# Patient Record
Sex: FEMALE | Race: WHITE | NOT HISPANIC OR LATINO | Employment: OTHER | URBAN - METROPOLITAN AREA
[De-identification: names, ages, dates, MRNs, and addresses within clinical notes are randomized per-mention and may not be internally consistent; named-entity substitution may affect disease eponyms.]

---

## 2020-06-19 DIAGNOSIS — G93.3 POSTVIRAL FATIGUE SYNDROME: ICD-10-CM

## 2020-06-19 RX ORDER — PHENTERMINE HYDROCHLORIDE 37.5 MG/1
TABLET ORAL
Qty: 30 TABLET | Refills: 0 | Status: SHIPPED | OUTPATIENT
Start: 2020-06-19 | End: 2020-07-09 | Stop reason: SDUPTHER

## 2020-07-08 ENCOUNTER — TELEPHONE (OUTPATIENT)
Dept: FAMILY MEDICINE CLINIC | Facility: CLINIC | Age: 65
End: 2020-07-08

## 2020-07-08 DIAGNOSIS — G93.3 POSTVIRAL FATIGUE SYNDROME: ICD-10-CM

## 2020-07-08 DIAGNOSIS — E78.2 MIXED HYPERLIPIDEMIA: Primary | ICD-10-CM

## 2020-07-08 DIAGNOSIS — J30.1 ALLERGIC RHINITIS DUE TO POLLEN, UNSPECIFIED SEASONALITY: ICD-10-CM

## 2020-07-08 NOTE — TELEPHONE ENCOUNTER
patient called needs to change pharmacy  Please call in her scripts for   · Zetia 10 mg tablet   Take 1 tablet every day by oral route    · mometasone 50 mcg/actuation nasal spray   USE 1 SPRAY IN EACH NOSTRIL TWICE A DAY    · phentermine 37 5 mg tablet   half a day po      Please call into [facility]  Thank you [facility]

## 2020-07-09 RX ORDER — EZETIMIBE 10 MG/1
10 TABLET ORAL DAILY
COMMUNITY
Start: 2020-05-15 | End: 2020-07-09 | Stop reason: SDUPTHER

## 2020-07-09 RX ORDER — MOMETASONE FUROATE 50 UG/1
1 SPRAY, METERED NASAL 2 TIMES DAILY
COMMUNITY
End: 2020-07-09 | Stop reason: SDUPTHER

## 2020-07-09 RX ORDER — PHENTERMINE HYDROCHLORIDE 37.5 MG/1
18.75 TABLET ORAL DAILY
Qty: 45 TABLET | Refills: 0 | Status: SHIPPED | OUTPATIENT
Start: 2020-07-09 | End: 2020-09-18 | Stop reason: SDUPTHER

## 2020-07-09 RX ORDER — MOMETASONE FUROATE 50 UG/1
1 SPRAY, METERED NASAL 2 TIMES DAILY
Qty: 3 ACT | Refills: 1 | Status: SHIPPED | OUTPATIENT
Start: 2020-07-09 | End: 2021-12-08

## 2020-07-09 RX ORDER — EZETIMIBE 10 MG/1
10 TABLET ORAL DAILY
Qty: 90 TABLET | Refills: 1 | Status: SHIPPED | OUTPATIENT
Start: 2020-07-09 | End: 2020-09-18 | Stop reason: SDUPTHER

## 2020-09-18 ENCOUNTER — OFFICE VISIT (OUTPATIENT)
Dept: FAMILY MEDICINE CLINIC | Facility: CLINIC | Age: 65
End: 2020-09-18
Payer: MEDICARE

## 2020-09-18 VITALS
TEMPERATURE: 97.6 F | WEIGHT: 161 LBS | HEART RATE: 84 BPM | HEIGHT: 63 IN | OXYGEN SATURATION: 98 % | DIASTOLIC BLOOD PRESSURE: 76 MMHG | RESPIRATION RATE: 16 BRPM | SYSTOLIC BLOOD PRESSURE: 114 MMHG | BODY MASS INDEX: 28.53 KG/M2

## 2020-09-18 DIAGNOSIS — E78.2 MIXED HYPERLIPIDEMIA: ICD-10-CM

## 2020-09-18 DIAGNOSIS — J30.1 ALLERGIC RHINITIS DUE TO POLLEN, UNSPECIFIED SEASONALITY: ICD-10-CM

## 2020-09-18 DIAGNOSIS — R53.82 CHRONIC FATIGUE: Primary | ICD-10-CM

## 2020-09-18 DIAGNOSIS — G93.3 POSTVIRAL FATIGUE SYNDROME: ICD-10-CM

## 2020-09-18 DIAGNOSIS — M79.675 PAIN OF TOE OF LEFT FOOT: ICD-10-CM

## 2020-09-18 DIAGNOSIS — M25.572 ARTHRALGIA OF TOE OF LEFT FOOT: ICD-10-CM

## 2020-09-18 PROBLEM — G93.31 POSTVIRAL FATIGUE SYNDROME: Status: ACTIVE | Noted: 2020-09-18

## 2020-09-18 PROCEDURE — 3288F FALL RISK ASSESSMENT DOCD: CPT | Performed by: FAMILY MEDICINE

## 2020-09-18 PROCEDURE — 99214 OFFICE O/P EST MOD 30 MIN: CPT | Performed by: FAMILY MEDICINE

## 2020-09-18 PROCEDURE — 1036F TOBACCO NON-USER: CPT | Performed by: FAMILY MEDICINE

## 2020-09-18 PROCEDURE — 1101F PT FALLS ASSESS-DOCD LE1/YR: CPT | Performed by: FAMILY MEDICINE

## 2020-09-18 PROCEDURE — 3725F SCREEN DEPRESSION PERFORMED: CPT | Performed by: FAMILY MEDICINE

## 2020-09-18 RX ORDER — FUROSEMIDE 20 MG/1
1 TABLET ORAL 2 TIMES DAILY
COMMUNITY
Start: 2010-08-20 | End: 2021-12-08

## 2020-09-18 RX ORDER — EZETIMIBE 10 MG/1
10 TABLET ORAL DAILY
Qty: 90 TABLET | Refills: 1 | Status: SHIPPED | OUTPATIENT
Start: 2020-09-18 | End: 2020-10-19 | Stop reason: SDUPTHER

## 2020-09-18 RX ORDER — FEXOFENADINE HCL 180 MG/1
TABLET ORAL
COMMUNITY
Start: 2010-03-01

## 2020-09-18 RX ORDER — PHENTERMINE HYDROCHLORIDE 37.5 MG/1
18.75 TABLET ORAL DAILY
Qty: 45 TABLET | Refills: 0 | Status: SHIPPED | OUTPATIENT
Start: 2020-09-18 | End: 2020-10-19 | Stop reason: SDUPTHER

## 2020-09-18 RX ORDER — CICLESONIDE 50 UG/1
2 SPRAY NASAL DAILY
Qty: 12.5 G | Refills: 3 | Status: SHIPPED | OUTPATIENT
Start: 2020-09-18 | End: 2020-10-19 | Stop reason: SDUPTHER

## 2020-09-18 RX ORDER — CICLESONIDE 50 UG/1
SPRAY NASAL
COMMUNITY
Start: 2010-03-15 | End: 2020-09-18 | Stop reason: SDUPTHER

## 2020-09-18 NOTE — PROGRESS NOTES
BMI Counseling: Body mass index is 28 52 kg/m²  The BMI is above normal  Nutrition recommendations include decreasing portion sizes, encouraging healthy choices of fruits and vegetables, limiting drinks that contain sugar, moderation in carbohydrate intake, increasing intake of lean protein, reducing intake of saturated and trans fat and reducing intake of cholesterol  Exercise recommendations include exercising 3-5 times per week  No pharmacotherapy was ordered  Patient referred to PCP due to patient being overweight  Assessment/Plan:         Problem List Items Addressed This Visit        Respiratory    Allergic rhinitis due to pollen    Relevant Medications    ciclesonide (Omnaris) 50 MCG/ACT nasal spray       Other    Chronic fatigue - Primary    Mixed hyperlipidemia    Relevant Medications    ezetimibe (ZETIA) 10 mg tablet    Postviral fatigue syndrome    Relevant Medications    phentermine (ADIPEX-P) 37 5 MG tablet    Pain of toe of left foot      Other Visit Diagnoses     Arthralgia of toe of left foot        Relevant Orders    XR foot 3+ vw left            Subjective:      Patient ID: Jose F Hagen is a 72 y o  female  Pt here for  cehckup on  Chronic fatigue  , lipids   New problem of  3rd toe swollen and  Tender  The following portions of the patient's history were reviewed and updated as appropriate:   She has a past medical history of Hyperlipidemia and Obesity  ,  does not have any pertinent problems on file  ,   has a past surgical history that includes Knee surgery (Bilateral) and Tubal ligation  ,  family history includes Breast cancer in her maternal grandmother and paternal grandmother; Other in her mother  ,   reports that she quit smoking about 12 years ago  She smoked 1 00 pack per day  She has never used smokeless tobacco  She reports current alcohol use of about 2 0 standard drinks of alcohol per week   No history on file for drug ,  has No Known Allergies     Current Outpatient Medications   Medication Sig Dispense Refill    ezetimibe (ZETIA) 10 mg tablet Take 1 tablet (10 mg total) by mouth daily 90 tablet 1    mometasone (NASONEX) 50 mcg/act nasal spray 1 spray into each nostril 2 (two) times a day 3 Act 1    phentermine (ADIPEX-P) 37 5 MG tablet Take 0 5 tablets (18 75 mg total) by mouth daily 45 tablet 0    ciclesonide (Omnaris) 50 MCG/ACT nasal spray 2 sprays by Each Nare route daily 12 5 g 3    fexofenadine (ALLEGRA) 180 MG tablet Take by mouth      furosemide (Lasix) 20 mg tablet Take 1 tablet by mouth 2 (two) times a day       No current facility-administered medications for this visit  Review of Systems      Objective:  Vitals:    09/18/20 1229   BP: 114/76   BP Location: Left arm   Patient Position: Sitting   Cuff Size: Adult   Pulse: 84   Resp: 16   Temp: 97 6 °F (36 4 °C)   TempSrc: Temporal   SpO2: 98%   Weight: 73 kg (161 lb)   Height: 5' 3" (1 6 m)     Body mass index is 28 52 kg/m²       Physical Exam

## 2020-10-19 DIAGNOSIS — J30.1 ALLERGIC RHINITIS DUE TO POLLEN, UNSPECIFIED SEASONALITY: ICD-10-CM

## 2020-10-19 DIAGNOSIS — G93.3 POSTVIRAL FATIGUE SYNDROME: ICD-10-CM

## 2020-10-19 DIAGNOSIS — E78.2 MIXED HYPERLIPIDEMIA: ICD-10-CM

## 2020-10-20 RX ORDER — EZETIMIBE 10 MG/1
10 TABLET ORAL DAILY
Qty: 90 TABLET | Refills: 1 | Status: SHIPPED | OUTPATIENT
Start: 2020-10-20 | End: 2021-05-10

## 2020-10-20 RX ORDER — CICLESONIDE 50 UG/1
2 SPRAY NASAL DAILY
Qty: 12.5 G | Refills: 3 | Status: SHIPPED | OUTPATIENT
Start: 2020-10-20 | End: 2021-10-12

## 2020-10-20 RX ORDER — PHENTERMINE HYDROCHLORIDE 37.5 MG/1
18.75 TABLET ORAL DAILY
Qty: 45 TABLET | Refills: 0 | Status: SHIPPED | OUTPATIENT
Start: 2020-10-20 | End: 2021-01-05

## 2021-01-05 DIAGNOSIS — G93.3 POSTVIRAL FATIGUE SYNDROME: ICD-10-CM

## 2021-01-05 RX ORDER — PHENTERMINE HYDROCHLORIDE 37.5 MG/1
TABLET ORAL
Qty: 45 TABLET | Refills: 0 | Status: SHIPPED | OUTPATIENT
Start: 2021-01-05 | End: 2021-04-15

## 2021-04-15 DIAGNOSIS — G93.3 POSTVIRAL FATIGUE SYNDROME: ICD-10-CM

## 2021-04-15 RX ORDER — PHENTERMINE HYDROCHLORIDE 37.5 MG/1
TABLET ORAL
Qty: 45 TABLET | Refills: 0 | Status: SHIPPED | OUTPATIENT
Start: 2021-04-15 | End: 2021-07-12

## 2021-05-03 ENCOUNTER — TELEPHONE (OUTPATIENT)
Dept: FAMILY MEDICINE CLINIC | Facility: CLINIC | Age: 66
End: 2021-05-03

## 2021-05-04 DIAGNOSIS — Z11.59 ENCOUNTER FOR HEPATITIS C SCREENING TEST FOR LOW RISK PATIENT: Primary | ICD-10-CM

## 2021-05-04 DIAGNOSIS — E78.2 MIXED HYPERLIPIDEMIA: ICD-10-CM

## 2021-05-04 DIAGNOSIS — R53.82 CHRONIC FATIGUE: ICD-10-CM

## 2021-05-04 NOTE — TELEPHONE ENCOUNTER
Pt needs a bloodwork script to have done prior to her appointment on Monday  Pt will go to a [facility]'s facility - please call when ready

## 2021-05-06 ENCOUNTER — APPOINTMENT (OUTPATIENT)
Dept: LAB | Facility: MEDICAL CENTER | Age: 66
End: 2021-05-06
Payer: MEDICARE

## 2021-05-06 DIAGNOSIS — Z11.59 ENCOUNTER FOR HEPATITIS C SCREENING TEST FOR LOW RISK PATIENT: ICD-10-CM

## 2021-05-06 DIAGNOSIS — R53.82 CHRONIC FATIGUE: ICD-10-CM

## 2021-05-06 DIAGNOSIS — E78.2 MIXED HYPERLIPIDEMIA: ICD-10-CM

## 2021-05-06 LAB
ALBUMIN SERPL BCP-MCNC: 3.7 G/DL (ref 3.5–5)
ALP SERPL-CCNC: 124 U/L (ref 46–116)
ALT SERPL W P-5'-P-CCNC: 29 U/L (ref 12–78)
ANION GAP SERPL CALCULATED.3IONS-SCNC: 2 MMOL/L (ref 4–13)
AST SERPL W P-5'-P-CCNC: 14 U/L (ref 5–45)
BACTERIA UR QL AUTO: NORMAL /HPF
BASOPHILS # BLD AUTO: 0.04 THOUSANDS/ΜL (ref 0–0.1)
BASOPHILS NFR BLD AUTO: 1 % (ref 0–1)
BILIRUB SERPL-MCNC: 0.29 MG/DL (ref 0.2–1)
BILIRUB UR QL STRIP: NEGATIVE
BUN SERPL-MCNC: 22 MG/DL (ref 5–25)
CALCIUM SERPL-MCNC: 9.4 MG/DL (ref 8.3–10.1)
CHLORIDE SERPL-SCNC: 110 MMOL/L (ref 100–108)
CHOLEST SERPL-MCNC: 201 MG/DL (ref 50–200)
CLARITY UR: CLEAR
CO2 SERPL-SCNC: 28 MMOL/L (ref 21–32)
COLOR UR: YELLOW
CREAT SERPL-MCNC: 0.78 MG/DL (ref 0.6–1.3)
EOSINOPHIL # BLD AUTO: 0.34 THOUSAND/ΜL (ref 0–0.61)
EOSINOPHIL NFR BLD AUTO: 7 % (ref 0–6)
ERYTHROCYTE [DISTWIDTH] IN BLOOD BY AUTOMATED COUNT: 14.6 % (ref 11.6–15.1)
GFR SERPL CREATININE-BSD FRML MDRD: 79 ML/MIN/1.73SQ M
GLUCOSE P FAST SERPL-MCNC: 83 MG/DL (ref 65–99)
GLUCOSE UR STRIP-MCNC: NEGATIVE MG/DL
HCT VFR BLD AUTO: 44.6 % (ref 34.8–46.1)
HCV AB SER QL: NORMAL
HDLC SERPL-MCNC: 69 MG/DL
HGB BLD-MCNC: 14.3 G/DL (ref 11.5–15.4)
HGB UR QL STRIP.AUTO: NEGATIVE
IMM GRANULOCYTES # BLD AUTO: 0.01 THOUSAND/UL (ref 0–0.2)
IMM GRANULOCYTES NFR BLD AUTO: 0 % (ref 0–2)
KETONES UR STRIP-MCNC: NEGATIVE MG/DL
LDLC SERPL CALC-MCNC: 111 MG/DL (ref 0–100)
LEUKOCYTE ESTERASE UR QL STRIP: NEGATIVE
LYMPHOCYTES # BLD AUTO: 1.23 THOUSANDS/ΜL (ref 0.6–4.47)
LYMPHOCYTES NFR BLD AUTO: 25 % (ref 14–44)
MAGNESIUM SERPL-MCNC: 2.6 MG/DL (ref 1.6–2.6)
MCH RBC QN AUTO: 31.8 PG (ref 26.8–34.3)
MCHC RBC AUTO-ENTMCNC: 32.1 G/DL (ref 31.4–37.4)
MCV RBC AUTO: 99 FL (ref 82–98)
MONOCYTES # BLD AUTO: 0.5 THOUSAND/ΜL (ref 0.17–1.22)
MONOCYTES NFR BLD AUTO: 10 % (ref 4–12)
NEUTROPHILS # BLD AUTO: 2.9 THOUSANDS/ΜL (ref 1.85–7.62)
NEUTS SEG NFR BLD AUTO: 57 % (ref 43–75)
NITRITE UR QL STRIP: NEGATIVE
NON-SQ EPI CELLS URNS QL MICRO: NORMAL /HPF
NRBC BLD AUTO-RTO: 0 /100 WBCS
PH UR STRIP.AUTO: 6 [PH]
PLATELET # BLD AUTO: 266 THOUSANDS/UL (ref 149–390)
PMV BLD AUTO: 11.4 FL (ref 8.9–12.7)
POTASSIUM SERPL-SCNC: 4.9 MMOL/L (ref 3.5–5.3)
PROT SERPL-MCNC: 7.5 G/DL (ref 6.4–8.2)
PROT UR STRIP-MCNC: NEGATIVE MG/DL
RBC # BLD AUTO: 4.5 MILLION/UL (ref 3.81–5.12)
RBC #/AREA URNS AUTO: NORMAL /HPF
SODIUM SERPL-SCNC: 140 MMOL/L (ref 136–145)
SP GR UR STRIP.AUTO: 1.02 (ref 1–1.03)
T4 FREE SERPL-MCNC: 0.92 NG/DL (ref 0.76–1.46)
TRIGL SERPL-MCNC: 104 MG/DL
TSH SERPL DL<=0.05 MIU/L-ACNC: 1.99 UIU/ML (ref 0.36–3.74)
URATE SERPL-MCNC: 4.5 MG/DL (ref 2–6.8)
UROBILINOGEN UR QL STRIP.AUTO: 0.2 E.U./DL
WBC # BLD AUTO: 5.02 THOUSAND/UL (ref 4.31–10.16)
WBC #/AREA URNS AUTO: NORMAL /HPF

## 2021-05-06 PROCEDURE — 80061 LIPID PANEL: CPT

## 2021-05-06 PROCEDURE — 85025 COMPLETE CBC W/AUTO DIFF WBC: CPT

## 2021-05-06 PROCEDURE — 36415 COLL VENOUS BLD VENIPUNCTURE: CPT

## 2021-05-06 PROCEDURE — 84439 ASSAY OF FREE THYROXINE: CPT

## 2021-05-06 PROCEDURE — 86803 HEPATITIS C AB TEST: CPT

## 2021-05-06 PROCEDURE — 80053 COMPREHEN METABOLIC PANEL: CPT

## 2021-05-06 PROCEDURE — 83735 ASSAY OF MAGNESIUM: CPT

## 2021-05-06 PROCEDURE — 81001 URINALYSIS AUTO W/SCOPE: CPT | Performed by: FAMILY MEDICINE

## 2021-05-06 PROCEDURE — 84550 ASSAY OF BLOOD/URIC ACID: CPT

## 2021-05-06 PROCEDURE — 84443 ASSAY THYROID STIM HORMONE: CPT

## 2021-05-10 ENCOUNTER — OFFICE VISIT (OUTPATIENT)
Dept: FAMILY MEDICINE CLINIC | Facility: CLINIC | Age: 66
End: 2021-05-10
Payer: MEDICARE

## 2021-05-10 VITALS
BODY MASS INDEX: 28.7 KG/M2 | TEMPERATURE: 98.2 F | HEIGHT: 63 IN | DIASTOLIC BLOOD PRESSURE: 70 MMHG | RESPIRATION RATE: 16 BRPM | SYSTOLIC BLOOD PRESSURE: 136 MMHG | HEART RATE: 84 BPM | OXYGEN SATURATION: 97 % | WEIGHT: 162 LBS

## 2021-05-10 DIAGNOSIS — M80.08XA AGE-RELATED OSTEOPOROSIS WITH CURRENT PATHOLOGICAL FRACTURE, VERTEBRA(E), INITIAL ENCOUNTER FOR FRACTURE (HCC): ICD-10-CM

## 2021-05-10 DIAGNOSIS — Z23 ENCOUNTER FOR IMMUNIZATION: ICD-10-CM

## 2021-05-10 DIAGNOSIS — Z00.00 WELL ADULT EXAM: ICD-10-CM

## 2021-05-10 DIAGNOSIS — Z12.11 SCREENING FOR COLORECTAL CANCER: ICD-10-CM

## 2021-05-10 DIAGNOSIS — E78.2 MIXED HYPERLIPIDEMIA: ICD-10-CM

## 2021-05-10 DIAGNOSIS — Z12.31 SCREENING MAMMOGRAM FOR HIGH-RISK PATIENT: ICD-10-CM

## 2021-05-10 DIAGNOSIS — Z13.820 SCREENING FOR OSTEOPOROSIS: ICD-10-CM

## 2021-05-10 DIAGNOSIS — J30.1 ALLERGIC RHINITIS DUE TO POLLEN, UNSPECIFIED SEASONALITY: ICD-10-CM

## 2021-05-10 DIAGNOSIS — Z12.12 SCREENING FOR COLORECTAL CANCER: ICD-10-CM

## 2021-05-10 DIAGNOSIS — Z11.59 NEED FOR HEPATITIS C SCREENING TEST: ICD-10-CM

## 2021-05-10 DIAGNOSIS — E78.2 MIXED HYPERLIPIDEMIA: Primary | ICD-10-CM

## 2021-05-10 DIAGNOSIS — G47.34 IDIOPATHIC SLEEP RELATED NONOBSTRUCTIVE ALVEOLAR HYPOVENTILATION: ICD-10-CM

## 2021-05-10 DIAGNOSIS — R53.82 CHRONIC FATIGUE: ICD-10-CM

## 2021-05-10 PROCEDURE — 1123F ACP DISCUSS/DSCN MKR DOCD: CPT | Performed by: FAMILY MEDICINE

## 2021-05-10 PROCEDURE — G0438 PPPS, INITIAL VISIT: HCPCS | Performed by: FAMILY MEDICINE

## 2021-05-10 PROCEDURE — 99213 OFFICE O/P EST LOW 20 MIN: CPT | Performed by: FAMILY MEDICINE

## 2021-05-10 RX ORDER — EZETIMIBE 10 MG/1
TABLET ORAL
Qty: 90 TABLET | Refills: 1 | Status: SHIPPED | OUTPATIENT
Start: 2021-05-10 | End: 2021-11-08

## 2021-05-10 NOTE — PROGRESS NOTES
Assessment and Plan:     Problem List Items Addressed This Visit        Respiratory    Allergic rhinitis due to pollen     Patient is stable  and will continue present plan of care and reassess at next routine visit  All questions about this problem from patient were answered today  Other    Chronic fatigue     Patient is stable  and will continue present plan of care and reassess at next routine visit  All questions about this problem from patient were answered today  Mixed hyperlipidemia - Primary     Patient  is stable with current medication and we discussed a low fat low cholesterol diet  Weight loss also discussed for this will help lower cholesterol also  Recheck lipids in 6 months  Other Visit Diagnoses     Well adult exam        Need for hepatitis C screening test        Screening for colorectal cancer        Encounter for immunization               Preventive health issues were discussed with patient, and age appropriate screening tests were ordered as noted in patient's After Visit Summary  Personalized health advice and appropriate referrals for health education or preventive services given if needed, as noted in patient's After Visit Summary       History of Present Illness:     Patient presents for Medicare Annual Wellness visit    Patient Care Team:  Gwendolyn Lee MD as PCP - General (Family Medicine)     Problem List:     Patient Active Problem List   Diagnosis    Chronic fatigue    Mixed hyperlipidemia    Allergic rhinitis due to pollen    Postviral fatigue syndrome    Pain of toe of left foot      Past Medical and Surgical History:     Past Medical History:   Diagnosis Date    Asthma     Obesity      Past Surgical History:   Procedure Laterality Date    JOINT REPLACEMENT      KNEE SURGERY Bilateral     REPLACEMENT    TONSILLECTOMY      TUBAL LIGATION        Family History:     Family History   Problem Relation Age of Onset    Other Mother maintenance procedure for cardiac pacemaker system     Breast cancer Maternal Grandmother     Breast cancer Paternal Grandmother       Social History:     E-Cigarette/Vaping    E-Cigarette Use Never User      E-Cigarette/Vaping Substances    Nicotine No     THC No     CBD No     Flavoring No     Other No     Unknown No      Social History     Socioeconomic History    Marital status: /Civil Union     Spouse name: None    Number of children: None    Years of education: None    Highest education level: None   Occupational History    Occupation: RETIRED   Social Needs    Financial resource strain: None    Food insecurity     Worry: None     Inability: None    Transportation needs     Medical: None     Non-medical: None   Tobacco Use    Smoking status: Former Smoker     Packs/day: 1 00     Quit date: 2008     Years since quittin 6    Smokeless tobacco: Never Used   Substance and Sexual Activity    Alcohol use:  Yes     Alcohol/week: 2 0 standard drinks     Types: 1 Cans of beer, 1 Standard drinks or equivalent per week     Frequency: 2-3 times a week    Drug use: Never    Sexual activity: None   Lifestyle    Physical activity     Days per week: None     Minutes per session: None    Stress: None   Relationships    Social connections     Talks on phone: None     Gets together: None     Attends Protestant service: None     Active member of club or organization: None     Attends meetings of clubs or organizations: None     Relationship status: None    Intimate partner violence     Fear of current or ex partner: None     Emotionally abused: None     Physically abused: None     Forced sexual activity: None   Other Topics Concern    None   Social History Narrative    · Do you currently or have you served in the [employer]:   No      · Were you activated, into active duty, as a member of the [employer] or as a Reservist:   No      · Exercise level:   None      · Diet:   Regular · General stress level:   Low      · ·Caffeine intake: Moderate      · Chewing tobacco:   none      · Seat belts used routinely:   Yes      · Sunscreen used routinely:   No      · Smoke alarm in home: Yes      ·       Medications and Allergies:     Current Outpatient Medications   Medication Sig Dispense Refill    ciclesonide (Omnaris) 50 MCG/ACT nasal spray 2 sprays by Each Nare route daily 12 5 g 3    ezetimibe (ZETIA) 10 mg tablet TAKE 1 TABLET DAILY 90 tablet 1    mometasone (NASONEX) 50 mcg/act nasal spray 1 spray into each nostril 2 (two) times a day 3 Act 1    phentermine (ADIPEX-P) 37 5 MG tablet TAKE ONE-HALF (1/2) TABLET DAILY 45 tablet 0    fexofenadine (ALLEGRA) 180 MG tablet Take by mouth      furosemide (Lasix) 20 mg tablet Take 1 tablet by mouth 2 (two) times a day       No current facility-administered medications for this visit  No Known Allergies   Immunizations:     Immunization History   Administered Date(s) Administered    Tdap 07/24/2020      Health Maintenance:         Topic Date Due    MAMMOGRAM  Never done    Colorectal Cancer Screening  Never done    Hepatitis C Screening  Completed         Topic Date Due    COVID-19 Vaccine (1) Never done    Pneumococcal Vaccine: 65+ Years (2 of 2 - PPSV23) 03/15/2020      Medicare Health Risk Assessment:     /70 (BP Location: Left arm, Patient Position: Sitting, Cuff Size: Standard)   Pulse 84   Temp 98 2 °F (36 8 °C)   Resp 16   Ht 5' 3" (1 6 m)   Wt 73 5 kg (162 lb)   SpO2 97%   BMI 28 70 kg/m²      Magali is here for her Subsequent Wellness visit  Health Risk Assessment:   Patient rates overall health as good  Patient feels that their physical health rating is same  Patient is very satisfied with their life  Eyesight was rated as same  Hearing was rated as same  Patient feels that their emotional and mental health rating is same  Patients states they are never, rarely angry   Patient states they are never, rarely unusually tired/fatigued  Pain experienced in the last 7 days has been none  Patient states that she has experienced no weight loss or gain in last 6 months  Depression Screening:   PHQ-2 Score: 0      Fall Risk Screening: In the past year, patient has experienced: no history of falling in past year      Urinary Incontinence Screening:   Patient has not leaked urine accidently in the last six months  Home Safety:  Patient does not have trouble with stairs inside or outside of their home  Patient has no working smoke alarms and has no working carbon monoxide detector  Home safety hazards include: none  Nutrition:   Current diet is Low Saturated Fat and Low Cholesterol  Medications:   Patient is not currently taking any over-the-counter supplements  Patient is able to manage medications  Activities of Daily Living (ADLs)/Instrumental Activities of Daily Living (IADLs):   Walk and transfer into and out of bed and chair?: Yes  Dress and groom yourself?: Yes    Bathe or shower yourself?: Yes    Feed yourself?  Yes  Do your laundry/housekeeping?: Yes  Manage your money, pay your bills and track your expenses?: Yes  Make your own meals?: Yes    Do your own shopping?: Yes    Previous Hospitalizations:   Any hospitalizations or ED visits within the last 12 months?: No      Advance Care Planning:   Living will: No    Durable POA for healthcare: No    Advanced directive: No      PREVENTIVE SCREENINGS      Cardiovascular Screening:    General: Screening Not Indicated and History Lipid Disorder      Diabetes Screening:     General: Screening Current      Cervical Cancer Screening:    General: Screening Not Indicated      Lung Cancer Screening:     General: Screening Not Indicated      Hepatitis C Screening:    General: Screening Current    Screening, Brief Intervention, and Referral to Treatment (SBIRT)    Screening  Typical number of drinks in a day: 0  Typical number of drinks in a week: 6  Interpretation: Low risk drinking behavior      Single Item Drug Screening:  How often have you used an illegal drug (including marijuana) or a prescription medication for non-medical reasons in the past year? never    Single Item Drug Screen Score: 0  Interpretation: Negative screen for possible drug use disorder      Severa Bos, MD

## 2021-05-10 NOTE — PROGRESS NOTES
BMI Counseling: Body mass index is 28 7 kg/m²  The BMI is above normal  Nutrition recommendations include decreasing portion sizes, encouraging healthy choices of fruits and vegetables, decreasing fast food intake, consuming healthier snacks, limiting drinks that contain sugar, moderation in carbohydrate intake, increasing intake of lean protein, reducing intake of saturated and trans fat and reducing intake of cholesterol  Exercise recommendations include exercising 3-5 times per week  No pharmacotherapy was ordered  Patient referred to PCP due to patient being overweight  Falls Plan of Care: balance, strength, and gait training instructions were provided  Home safety education provided  Assessment/Plan:         Problem List Items Addressed This Visit        Respiratory    Allergic rhinitis due to pollen     Patient is stable  and will continue present plan of care and reassess at next routine visit  All questions about this problem from patient were answered today  Other    Chronic fatigue     Patient is stable  and will continue present plan of care and reassess at next routine visit  All questions about this problem from patient were answered today  Mixed hyperlipidemia - Primary     Patient  is stable with current medication and we discussed a low fat low cholesterol diet  Weight loss also discussed for this will help lower cholesterol also  Recheck lipids in 6 months  Other Visit Diagnoses     Well adult exam        Need for hepatitis C screening test        Screening for colorectal cancer        Encounter for immunization                Subjective:      Patient ID: Ashley Mccall is a 77 y o  female  51-year-old white female here today for checkup for hyperlipidemia chronic fatigue syndrome and Medicare Wellness  Patient is doing well with her medications she will call for refills if she needs them    Her Medicare wellness was completed as well as we reviewed her medications and as well as her lab results  Patient is doing well with her medicines  Patient is considering the COVID-19 vaccine  The following portions of the patient's history were reviewed and updated as appropriate:   Past Medical History:  She has a past medical history of Asthma and Obesity  ,  _______________________________________________________________________  Medical Problems:  does not have any pertinent problems on file ,  _______________________________________________________________________  Past Surgical History:   has a past surgical history that includes Knee surgery (Bilateral); Tubal ligation; Joint replacement; and Tonsillectomy  ,  _______________________________________________________________________  Family History:  family history includes Breast cancer in her maternal grandmother and paternal grandmother; Other in her mother ,  _______________________________________________________________________  Social History:   reports that she quit smoking about 12 years ago  She smoked 1 00 pack per day  She has never used smokeless tobacco  She reports current alcohol use of about 2 0 standard drinks of alcohol per week  She reports that she does not use drugs  ,  _______________________________________________________________________  Allergies:  has No Known Allergies     _______________________________________________________________________  Current Outpatient Medications   Medication Sig Dispense Refill    ciclesonide (Omnaris) 50 MCG/ACT nasal spray 2 sprays by Each Nare route daily 12 5 g 3    ezetimibe (ZETIA) 10 mg tablet TAKE 1 TABLET DAILY 90 tablet 1    mometasone (NASONEX) 50 mcg/act nasal spray 1 spray into each nostril 2 (two) times a day 3 Act 1    phentermine (ADIPEX-P) 37 5 MG tablet TAKE ONE-HALF (1/2) TABLET DAILY 45 tablet 0    fexofenadine (ALLEGRA) 180 MG tablet Take by mouth      furosemide (Lasix) 20 mg tablet Take 1 tablet by mouth 2 (two) times a day No current facility-administered medications for this visit       _______________________________________________________________________  Review of Systems      Objective:  Vitals:    05/10/21 1338   BP: 136/70   BP Location: Left arm   Patient Position: Sitting   Cuff Size: Standard   Pulse: 84   Resp: 16   Temp: 98 2 °F (36 8 °C)   SpO2: 97%   Weight: 73 5 kg (162 lb)   Height: 5' 3" (1 6 m)     Body mass index is 28 7 kg/m²       Physical Exam

## 2021-05-10 NOTE — PATIENT INSTRUCTIONS
Medicare Preventive Visit Patient Instructions  Thank you for completing your Welcome to Medicare Visit or Medicare Annual Wellness Visit today  Your next wellness visit will be due in one year (5/11/2022)  The screening/preventive services that you may require over the next 5-10 years are detailed below  Some tests may not apply to you based off risk factors and/or age  Screening tests ordered at today's visit but not completed yet may show as past due  Also, please note that scanned in results may not display below  Preventive Screenings:  Service Recommendations Previous Testing/Comments   Colorectal Cancer Screening  * Colonoscopy    * Fecal Occult Blood Test (FOBT)/Fecal Immunochemical Test (FIT)  * Fecal DNA/Cologuard Test  * Flexible Sigmoidoscopy Age: 54-65 years old   Colonoscopy: every 10 years (may be performed more frequently if at higher risk)  OR  FOBT/FIT: every 1 year  OR  Cologuard: every 3 years  OR  Sigmoidoscopy: every 5 years  Screening may be recommended earlier than age 48 if at higher risk for colorectal cancer  Also, an individualized decision between you and your healthcare provider will decide whether screening between the ages of 74-80 would be appropriate  Colonoscopy: Not on file  FOBT/FIT: Not on file  Cologuard: Not on file  Sigmoidoscopy: Not on file          Breast Cancer Screening Age: 36 years old  Frequency: every 1-2 years  Not required if history of left and right mastectomy Mammogram: Not on file        Cervical Cancer Screening Between the ages of 21-29, pap smear recommended once every 3 years  Between the ages of 33-67, can perform pap smear with HPV co-testing every 5 years     Recommendations may differ for women with a history of total hysterectomy, cervical cancer, or abnormal pap smears in past  Pap Smear: Not on file    Screening Not Indicated   Hepatitis C Screening Once for adults born between [facility]  More frequently in patients at high risk for Hepatitis C Hep C Antibody: 05/06/2021    Screening Current   Diabetes Screening 1-2 times per year if you're at risk for diabetes or have pre-diabetes Fasting glucose: 83 mg/dL   A1C: No results in last 5 years    Screening Current   Cholesterol Screening Once every 5 years if you don't have a lipid disorder  May order more often based on risk factors  Lipid panel: 05/06/2021    Screening Not Indicated  History Lipid Disorder     Other Preventive Screenings Covered by Medicare:  1  Abdominal Aortic Aneurysm (AAA) Screening: covered once if your at risk  You're considered to be at risk if you have a family history of AAA  2  Lung Cancer Screening: covers low dose CT scan once per year if you meet all of the following conditions: (1) Age 50-69; (2) No signs or symptoms of lung cancer; (3) Current smoker or have quit smoking within the last 15 years; (4) You have a tobacco smoking history of at least 30 pack years (packs per day multiplied by number of years you smoked); (5) You get a written order from a healthcare provider  3  Glaucoma Screening: covered annually if you're considered high risk: (1) You have diabetes OR (2) Family history of glaucoma OR (3)  aged 48 and older OR (3)  American aged 72 and older  3  Osteoporosis Screening: covered every 2 years if you meet one of the following conditions: (1) You're estrogen deficient and at risk for osteoporosis based off medical history and other findings; (2) Have a vertebral abnormality; (3) On glucocorticoid therapy for more than 3 months; (4) Have primary hyperparathyroidism; (5) On osteoporosis medications and need to assess response to drug therapy  · Last bone density test (DXA Scan): Not on file  5  HIV Screening: covered annually if you're between the age of 12-76  Also covered annually if you are younger than 13 and older than 72 with risk factors for HIV infection   For pregnant patients, it is covered up to 3 times per pregnancy  Immunizations:  Immunization Recommendations   Influenza Vaccine Annual influenza vaccination during flu season is recommended for all persons aged >= 6 months who do not have contraindications   Pneumococcal Vaccine (Prevnar and Pneumovax)  * Prevnar = PCV13  * Pneumovax = PPSV23   Adults 25-60 years old: 1-3 doses may be recommended based on certain risk factors  Adults 72 years old: Prevnar (PCV13) vaccine recommended followed by Pneumovax (PPSV23) vaccine  If already received PPSV23 since turning 65, then PCV13 recommended at least one year after PPSV23 dose  Hepatitis B Vaccine 3 dose series if at intermediate or high risk (ex: diabetes, end stage renal disease, liver disease)   Tetanus (Td) Vaccine - COST NOT COVERED BY MEDICARE PART B Following completion of primary series, a booster dose should be given every 10 years to maintain immunity against tetanus  Td may also be given as tetanus wound prophylaxis  Tdap Vaccine - COST NOT COVERED BY MEDICARE PART B Recommended at least once for all adults  For pregnant patients, recommended with each pregnancy  Shingles Vaccine (Shingrix) - COST NOT COVERED BY MEDICARE PART B  2 shot series recommended in those aged 48 and above     Health Maintenance Due:      Topic Date Due    MAMMOGRAM  Never done    Colorectal Cancer Screening  Never done    Hepatitis C Screening  Completed     Immunizations Due:      Topic Date Due    COVID-19 Vaccine (1) Never done    Pneumococcal Vaccine: 65+ Years (2 of 2 - PPSV23) 03/15/2020     Advance Directives   What are advance directives? Advance directives are legal documents that state your wishes and plans for medical care  These plans are made ahead of time in case you lose your ability to make decisions for yourself  Advance directives can apply to any medical decision, such as the treatments you want, and if you want to donate organs  What are the types of advance directives?   There are many types of advance directives, and each state has rules about how to use them  You may choose a combination of any of the following:  · Living will: This is a written record of the treatment you want  You can also choose which treatments you do not want, which to limit, and which to stop at a certain time  This includes surgery, medicine, IV fluid, and tube feedings  · Durable power of  for healthcare [facility]): This is a written record that states who you want to make healthcare choices for you when you are unable to make them for yourself  This person, called a proxy, is usually a family member or a friend  You may choose more than 1 proxy  · Do not resuscitate (DNR) order:  A DNR order is used in case your heart stops beating or you stop breathing  It is a request not to have certain forms of treatment, such as CPR  A DNR order may be included in other types of advance directives  · Medical directive: This covers the care that you want if you are in a coma, near death, or unable to make decisions for yourself  You can list the treatments you want for each condition  Treatment may include pain medicine, surgery, blood transfusions, dialysis, IV or tube feedings, and a ventilator (breathing machine)  · Values history: This document has questions about your views, beliefs, and how you feel and think about life  This information can help others choose the care that you would choose  Why are advance directives important? An advance directive helps you control your care  Although spoken wishes may be used, it is better to have your wishes written down  Spoken wishes can be misunderstood, or not followed  Treatments may be given even if you do not want them  An advance directive may make it easier for your family to make difficult choices about your care     Weight Management   Why it is important to manage your weight:  Being overweight increases your risk of health conditions such as heart disease, high blood pressure, type 2 diabetes, and certain types of cancer  It can also increase your risk for osteoarthritis, sleep apnea, and other respiratory problems  Aim for a slow, steady weight loss  Even a small amount of weight loss can lower your risk of health problems  How to lose weight safely:  A safe and healthy way to lose weight is to eat fewer calories and get regular exercise  You can lose up about 1 pound a week by decreasing the number of calories you eat by 500 calories each day  Healthy meal plan for weight management:  A healthy meal plan includes a variety of foods, contains fewer calories, and helps you stay healthy  A healthy meal plan includes the following:  · Eat whole-grain foods more often  A healthy meal plan should contain fiber  Fiber is the part of grains, fruits, and vegetables that is not broken down by your body  Whole-grain foods are healthy and provide extra fiber in your diet  Some examples of whole-grain foods are whole-wheat breads and pastas, oatmeal, brown rice, and bulgur  · Eat a variety of vegetables every day  Include dark, leafy greens such as spinach, kale, rere greens, and mustard greens  Eat yellow and orange vegetables such as carrots, sweet potatoes, and winter squash  · Eat a variety of fruits every day  Choose fresh or canned fruit (canned in its own juice or light syrup) instead of juice  Fruit juice has very little or no fiber  · Eat low-fat dairy foods  Drink fat-free (skim) milk or 1% milk  Eat fat-free yogurt and low-fat cottage cheese  Try low-fat cheeses such as mozzarella and other reduced-fat cheeses  · Choose meat and other protein foods that are low in fat  Choose beans or other legumes such as split peas or lentils  Choose fish, skinless poultry (chicken or turkey), or lean cuts of red meat (beef or pork)  Before you cook meat or poultry, cut off any visible fat  · Use less fat and oil  Try baking foods instead of frying them   Add less fat, such as margarine, sour cream, regular salad dressing and mayonnaise to foods  Eat fewer high-fat foods  Some examples of high-fat foods include french fries, doughnuts, ice cream, and cakes  · Eat fewer sweets  Limit foods and drinks that are high in sugar  This includes candy, cookies, regular soda, and sweetened drinks  Exercise:  Exercise at least 30 minutes per day on most days of the week  Some examples of exercise include walking, biking, dancing, and swimming  You can also fit in more physical activity by taking the stairs instead of the elevator or parking farther away from stores  Ask your healthcare provider about the best exercise plan for you  © Copyright [employer] 2018 Information is for End User's use only and may not be sold, redistributed or otherwise used for commercial purposes   All illustrations and images included in CareNotes® are the copyrighted property of A D A M , Inc  or [address] [place] [address]

## 2021-07-11 DIAGNOSIS — G93.3 POSTVIRAL FATIGUE SYNDROME: ICD-10-CM

## 2021-07-12 RX ORDER — PHENTERMINE HYDROCHLORIDE 37.5 MG/1
TABLET ORAL
Qty: 45 TABLET | Refills: 0 | Status: SHIPPED | OUTPATIENT
Start: 2021-07-12 | End: 2021-10-12

## 2021-10-12 DIAGNOSIS — G93.3 POSTVIRAL FATIGUE SYNDROME: ICD-10-CM

## 2021-10-12 DIAGNOSIS — J30.1 ALLERGIC RHINITIS DUE TO POLLEN, UNSPECIFIED SEASONALITY: ICD-10-CM

## 2021-10-12 RX ORDER — CICLESONIDE 50 UG/1
SPRAY NASAL
Qty: 12.5 G | Refills: 11 | Status: SHIPPED | OUTPATIENT
Start: 2021-10-12

## 2021-10-12 RX ORDER — PHENTERMINE HYDROCHLORIDE 37.5 MG/1
TABLET ORAL
Qty: 45 TABLET | Refills: 0 | Status: SHIPPED | OUTPATIENT
Start: 2021-10-12 | End: 2021-12-18

## 2021-10-18 ENCOUNTER — TELEPHONE (OUTPATIENT)
Dept: FAMILY MEDICINE CLINIC | Facility: CLINIC | Age: 66
End: 2021-10-18

## 2021-11-08 DIAGNOSIS — E78.2 MIXED HYPERLIPIDEMIA: ICD-10-CM

## 2021-11-08 RX ORDER — EZETIMIBE 10 MG/1
TABLET ORAL
Qty: 90 TABLET | Refills: 3 | Status: SHIPPED | OUTPATIENT
Start: 2021-11-08

## 2021-12-08 ENCOUNTER — OFFICE VISIT (OUTPATIENT)
Dept: FAMILY MEDICINE CLINIC | Facility: CLINIC | Age: 66
End: 2021-12-08
Payer: MEDICARE

## 2021-12-08 VITALS
HEIGHT: 63 IN | HEART RATE: 81 BPM | TEMPERATURE: 98 F | DIASTOLIC BLOOD PRESSURE: 68 MMHG | OXYGEN SATURATION: 96 % | BODY MASS INDEX: 28.7 KG/M2 | RESPIRATION RATE: 16 BRPM | SYSTOLIC BLOOD PRESSURE: 138 MMHG | WEIGHT: 162 LBS

## 2021-12-08 DIAGNOSIS — E78.2 MIXED HYPERLIPIDEMIA: ICD-10-CM

## 2021-12-08 DIAGNOSIS — Z12.12 SCREENING FOR COLORECTAL CANCER: ICD-10-CM

## 2021-12-08 DIAGNOSIS — M65.9 TENOSYNOVITIS OF THUMB: ICD-10-CM

## 2021-12-08 DIAGNOSIS — Z12.11 SCREENING FOR COLORECTAL CANCER: ICD-10-CM

## 2021-12-08 DIAGNOSIS — Z12.31 ENCOUNTER FOR SCREENING MAMMOGRAM FOR BREAST CANCER: ICD-10-CM

## 2021-12-08 DIAGNOSIS — R53.82 CHRONIC FATIGUE: ICD-10-CM

## 2021-12-08 DIAGNOSIS — J30.1 ALLERGIC RHINITIS DUE TO POLLEN, UNSPECIFIED SEASONALITY: Primary | ICD-10-CM

## 2021-12-08 PROBLEM — M65.949 TENOSYNOVITIS OF THUMB: Status: ACTIVE | Noted: 2021-12-08

## 2021-12-08 PROCEDURE — 99214 OFFICE O/P EST MOD 30 MIN: CPT | Performed by: FAMILY MEDICINE

## 2021-12-08 RX ORDER — NAPROXEN 500 MG/1
500 TABLET ORAL 2 TIMES DAILY WITH MEALS
Qty: 180 TABLET | Refills: 1 | Status: SHIPPED | OUTPATIENT
Start: 2021-12-08

## 2021-12-17 DIAGNOSIS — G93.3 POSTVIRAL FATIGUE SYNDROME: ICD-10-CM

## 2021-12-18 RX ORDER — PHENTERMINE HYDROCHLORIDE 37.5 MG/1
TABLET ORAL
Qty: 45 TABLET | Refills: 0 | Status: SHIPPED | OUTPATIENT
Start: 2021-12-18 | End: 2022-03-22

## 2022-01-02 ENCOUNTER — OFFICE VISIT (OUTPATIENT)
Dept: URGENT CARE | Facility: MEDICAL CENTER | Age: 67
End: 2022-01-02
Payer: MEDICARE

## 2022-01-02 VITALS
TEMPERATURE: 97.6 F | RESPIRATION RATE: 18 BRPM | HEART RATE: 90 BPM | WEIGHT: 159 LBS | OXYGEN SATURATION: 99 % | BODY MASS INDEX: 28.17 KG/M2 | DIASTOLIC BLOOD PRESSURE: 82 MMHG | HEIGHT: 63 IN | SYSTOLIC BLOOD PRESSURE: 168 MMHG

## 2022-01-02 DIAGNOSIS — S01.81XA LACERATION OF FOREHEAD, INITIAL ENCOUNTER: Primary | ICD-10-CM

## 2022-01-02 PROCEDURE — 12013 RPR F/E/E/N/L/M 2.6-5.0 CM: CPT | Performed by: PHYSICIAN ASSISTANT

## 2022-01-02 PROCEDURE — G0463 HOSPITAL OUTPT CLINIC VISIT: HCPCS | Performed by: PHYSICIAN ASSISTANT

## 2022-01-02 PROCEDURE — 99213 OFFICE O/P EST LOW 20 MIN: CPT | Performed by: PHYSICIAN ASSISTANT

## 2022-01-02 RX ORDER — CEPHALEXIN 500 MG/1
500 CAPSULE ORAL EVERY 6 HOURS SCHEDULED
Qty: 20 CAPSULE | Refills: 0 | Status: SHIPPED | OUTPATIENT
Start: 2022-01-02 | End: 2022-01-07

## 2022-01-02 NOTE — PROGRESS NOTES
[facility] [employer] [facility]        NAME: Sonja Pham is a 77 y o  female  : 1955    MRN: [id number]  DATE: 2022  TIME: 5:57 PM    Assessment and Plan   Laceration of forehead, initial encounter [S01 [id number]]  1  Laceration of forehead, initial encounter           Patient Instructions     Laceration forehead  dermabond applied  Follow up with PCP in 3-5 days  Proceed to  ER if symptoms worsen  Chief Complaint     Chief Complaint   Patient presents with    Laceration     Pt  tripped and fell hitting her forehead on the floor  She has a large laceration to her right forehead         History of Present Illness       76 y/o female presents c/o having tripped and fallen  States she thinks the glasses cut her forehead  Denies LOC, nausea, vomiting, unsteady gait, blood thinners  Tetanus injection in       Review of Systems   Review of Systems   Constitutional: Negative  HENT: Negative  Eyes: Negative  Respiratory: Negative  Negative for cough, chest tightness, shortness of breath, wheezing and stridor  Cardiovascular: Negative  Negative for chest pain, palpitations and leg swelling  Skin: Positive for wound           Current Medications       Current Outpatient Medications:     ezetimibe (ZETIA) 10 mg tablet, TAKE 1 TABLET DAILY, Disp: 90 tablet, Rfl: 3    naproxen (Naprosyn) 500 mg tablet, Take 1 tablet (500 mg total) by mouth 2 (two) times a day with meals, Disp: 180 tablet, Rfl: 1    Omnaris 50 MCG/ACT nasal spray, USE 2 SPRAYS IN EACH NOSTRIL DAILY, Disp: 12 5 g, Rfl: 11    phentermine (ADIPEX-P) 37 5 MG tablet, TAKE ONE-HALF (1/2) TABLET DAILY, Disp: 45 tablet, Rfl: 0    Elastic Bandages & Supports (Splint Wrist Brace/Left-Right) MISC, Use daily at bedtime, Disp: 1 each, Rfl: 0    fexofenadine (ALLEGRA) 180 MG tablet, Take by mouth (Patient not taking: Reported on 2022 ), Disp: , Rfl:     mometasone (NASONEX) 50 mcg/act nasal spray, 1 spray into each nostril 2 (two) times a day, Disp: 3 Act, Rfl: 1    Current Allergies     Allergies as of 01/02/2022    (No Known Allergies)            The following portions of the patient's history were reviewed and updated as appropriate: allergies, current medications, past family history, past medical history, past social history, past surgical history and problem list      Past Medical History:   Diagnosis Date    Arthritis     hands    Asthma     Obesity        Past Surgical History:   Procedure Laterality Date    JOINT REPLACEMENT      KNEE SURGERY Bilateral     REPLACEMENT    TONSILLECTOMY      TUBAL LIGATION         Family History   Problem Relation Age of Onset    Other Mother         maintenance procedure for cardiac pacemaker system     Breast cancer Maternal Grandmother     Breast cancer Paternal Grandmother          Medications have been verified  Objective   /82   Pulse 90   Temp 97 6 °F (36 4 °C)   Resp 18   Ht 5' 3" (1 6 m)   Wt 72 1 kg (159 lb)   SpO2 99%   BMI 28 17 kg/m²        Physical Exam     Physical Exam  Constitutional:       Appearance: Normal appearance  She is well-developed  HENT:      Head: Normocephalic and atraumatic  Right Ear: External ear normal       Left Ear: External ear normal       Nose: Nose normal       Mouth/Throat:      Pharynx: No oropharyngeal exudate  Cardiovascular:      Rate and Rhythm: Normal rate and regular rhythm  Heart sounds: Normal heart sounds  Pulmonary:      Effort: Pulmonary effort is normal  No respiratory distress  Breath sounds: Normal breath sounds  No wheezing or rales  Chest:      Chest wall: No tenderness  Musculoskeletal:      Cervical back: Normal range of motion and neck supple  Lymphadenopathy:      Cervical: No cervical adenopathy  Skin:         Neurological:      Mental Status: She is alert             Laceration repair    Date/Time: 1/2/2022 6:00 PM  Performed by: Henrique Goodman PA-C  Authorized by: Sydni Dhaliwal [address] Naomi Patel PA-C   Consent: Verbal consent obtained  Risks and benefits: risks, benefits and alternatives were discussed  Consent given by: patient  Patient understanding: patient states understanding of the procedure being performed  Patient identity confirmed: verbally with patient  Body area: head/neck  Location details: forehead  Laceration length: 4 cm  Foreign bodies: no foreign bodies  Tendon involvement: none  Nerve involvement: none  Vascular damage: no    Sedation:  Patient sedated: no      Wound Dehiscence:  Superficial Wound Dehiscence: simple closure      Procedure Details:  Preparation: Patient was prepped and draped in the usual sterile fashion    Irrigation solution: saline  Irrigation method: jet lavage  Amount of cleaning: standard  Debridement: none  Degree of undermining: none  Skin closure: glue  Dressing: 4x4 sterile gauze and gauze roll  Patient tolerance: patient tolerated the procedure well with no immediate complications

## 2022-01-02 NOTE — PATIENT INSTRUCTIONS
Laceration forehead  dermabond applied  Keflex as directed  Follow up with PCP in 3-5 days  Proceed to  ER if symptoms worsen

## 2022-03-22 DIAGNOSIS — G93.3 POSTVIRAL FATIGUE SYNDROME: ICD-10-CM

## 2022-03-22 RX ORDER — PHENTERMINE HYDROCHLORIDE 37.5 MG/1
TABLET ORAL
Qty: 45 TABLET | Refills: 0 | Status: SHIPPED | OUTPATIENT
Start: 2022-03-22 | End: 2022-06-17

## 2022-04-06 ENCOUNTER — OFFICE VISIT (OUTPATIENT)
Dept: FAMILY MEDICINE CLINIC | Facility: CLINIC | Age: 67
End: 2022-04-06
Payer: MEDICARE

## 2022-04-06 VITALS
BODY MASS INDEX: 27.29 KG/M2 | WEIGHT: 154 LBS | SYSTOLIC BLOOD PRESSURE: 152 MMHG | TEMPERATURE: 98 F | OXYGEN SATURATION: 98 % | RESPIRATION RATE: 16 BRPM | DIASTOLIC BLOOD PRESSURE: 80 MMHG | HEART RATE: 102 BPM | HEIGHT: 63 IN

## 2022-04-06 DIAGNOSIS — N34.2 INFECTIVE URETHRITIS: ICD-10-CM

## 2022-04-06 DIAGNOSIS — R39.9 UTI SYMPTOMS: Primary | ICD-10-CM

## 2022-04-06 LAB
SL AMB  POCT GLUCOSE, UA: ABNORMAL
SL AMB LEUKOCYTE ESTERASE,UA: ABNORMAL
SL AMB POCT BILIRUBIN,UA: ABNORMAL
SL AMB POCT BLOOD,UA: ABNORMAL
SL AMB POCT CLARITY,UA: ABNORMAL
SL AMB POCT COLOR,UA: YELLOW
SL AMB POCT KETONES,UA: ABNORMAL
SL AMB POCT NITRITE,UA: ABNORMAL
SL AMB POCT PH,UA: 6
SL AMB POCT SPECIFIC GRAVITY,UA: 1.01
SL AMB POCT URINE PROTEIN: ABNORMAL
SL AMB POCT UROBILINOGEN: 0.2

## 2022-04-06 PROCEDURE — 99213 OFFICE O/P EST LOW 20 MIN: CPT | Performed by: NURSE PRACTITIONER

## 2022-04-06 PROCEDURE — 81002 URINALYSIS NONAUTO W/O SCOPE: CPT | Performed by: NURSE PRACTITIONER

## 2022-04-06 RX ORDER — CIPROFLOXACIN 500 MG/1
500 TABLET, FILM COATED ORAL EVERY 12 HOURS SCHEDULED
Qty: 10 TABLET | Refills: 0 | Status: SHIPPED | OUTPATIENT
Start: 2022-04-06 | End: 2022-04-11

## 2022-04-06 NOTE — PROGRESS NOTES
Assessment/Plan:  UTI  In office urine dip demonstrated leukocytes nitrites red blood cells white blood cells patient is symptomatic did advise most likely bladder infection Cipro 500 mg b i d  X5 days was sent to the pharmacy dosing all possible side effects were discussed patient is advised if she develops vaginal yeast she should contact me in the office I can send Diflucan for to the pharmacy for her all questions answered patient states she is very happy with the outcome today's visit  Dosing all possible side effects of the prescribed medications or medications that had been prescribed in the past were reviewed and all questions were answered  Patient verbalized agreement and understanding of the plan of care as outlined during the office visit today return to office as indicated or sooner if a problem arises  Problem List Items Addressed This Visit     None      Visit Diagnoses     UTI symptoms    -  Primary    Relevant Orders    POCT urine dip            Subjective:      Patient ID: Kike Gauthier is a 79 y o  female  Patient 7 urinary tract infection symptoms  Dysuria has been off and on for several days  Nothing she has tried at home has worked  She denies any nausea vomiting diarrhea chest pains or shortness of breath      The following portions of the patient's history were reviewed and updated as appropriate: allergies, current medications, past family history, past medical history, past social history, past surgical history and problem list     Review of Systems   Constitutional: Negative for fever  Respiratory: Negative for shortness of breath  Cardiovascular: Negative for chest pain  Genitourinary: Positive for dysuria, frequency and urgency  Objective:      Resp 16   Ht 5' 3" (1 6 m)   Wt 69 9 kg (154 lb)   BMI 27 28 kg/m²          Physical Exam  Vitals and nursing note reviewed  Constitutional:       General: She is not in acute distress       Appearance: Normal appearance  She is well-developed  She is not diaphoretic  Cardiovascular:      Rate and Rhythm: Normal rate and regular rhythm  Pulmonary:      Effort: Pulmonary effort is normal       Breath sounds: Normal breath sounds  Abdominal:      General: Bowel sounds are normal       Palpations: Abdomen is soft  Tenderness: There is abdominal tenderness  Neurological:      Mental Status: She is alert and oriented to person, place, and time  Psychiatric:         Behavior: Behavior normal          Thought Content:  Thought content normal

## 2022-06-17 DIAGNOSIS — G93.3 POSTVIRAL FATIGUE SYNDROME: ICD-10-CM

## 2022-06-17 RX ORDER — PHENTERMINE HYDROCHLORIDE 37.5 MG/1
TABLET ORAL
Qty: 45 TABLET | Refills: 0 | Status: SHIPPED | OUTPATIENT
Start: 2022-06-17

## 2022-08-22 ENCOUNTER — APPOINTMENT (OUTPATIENT)
Dept: LAB | Facility: MEDICAL CENTER | Age: 67
End: 2022-08-22
Payer: MEDICARE

## 2022-08-22 DIAGNOSIS — E78.2 MIXED HYPERLIPIDEMIA: ICD-10-CM

## 2022-08-22 DIAGNOSIS — R53.82 CHRONIC FATIGUE: ICD-10-CM

## 2022-08-22 LAB
ALBUMIN SERPL BCP-MCNC: 3.4 G/DL (ref 3.5–5)
ALP SERPL-CCNC: 109 U/L (ref 46–116)
ALT SERPL W P-5'-P-CCNC: 31 U/L (ref 12–78)
ANION GAP SERPL CALCULATED.3IONS-SCNC: 3 MMOL/L (ref 4–13)
AST SERPL W P-5'-P-CCNC: 24 U/L (ref 5–45)
BACTERIA UR QL AUTO: ABNORMAL /HPF
BASOPHILS # BLD AUTO: 0.06 THOUSANDS/ΜL (ref 0–0.1)
BASOPHILS NFR BLD AUTO: 1 % (ref 0–1)
BILIRUB SERPL-MCNC: 0.27 MG/DL (ref 0.2–1)
BILIRUB UR QL STRIP: NEGATIVE
BUN SERPL-MCNC: 14 MG/DL (ref 5–25)
CALCIUM ALBUM COR SERPL-MCNC: 9.9 MG/DL (ref 8.3–10.1)
CALCIUM SERPL-MCNC: 9.4 MG/DL (ref 8.3–10.1)
CHLORIDE SERPL-SCNC: 108 MMOL/L (ref 96–108)
CHOLEST SERPL-MCNC: 197 MG/DL
CLARITY UR: CLEAR
CO2 SERPL-SCNC: 29 MMOL/L (ref 21–32)
COLOR UR: ABNORMAL
CREAT SERPL-MCNC: 0.71 MG/DL (ref 0.6–1.3)
EOSINOPHIL # BLD AUTO: 0.31 THOUSAND/ΜL (ref 0–0.61)
EOSINOPHIL NFR BLD AUTO: 7 % (ref 0–6)
ERYTHROCYTE [DISTWIDTH] IN BLOOD BY AUTOMATED COUNT: 14.6 % (ref 11.6–15.1)
GFR SERPL CREATININE-BSD FRML MDRD: 88 ML/MIN/1.73SQ M
GLUCOSE P FAST SERPL-MCNC: 86 MG/DL (ref 65–99)
GLUCOSE UR STRIP-MCNC: NEGATIVE MG/DL
HCT VFR BLD AUTO: 44.2 % (ref 34.8–46.1)
HDLC SERPL-MCNC: 60 MG/DL
HGB BLD-MCNC: 14 G/DL (ref 11.5–15.4)
HGB UR QL STRIP.AUTO: NEGATIVE
IMM GRANULOCYTES # BLD AUTO: 0.01 THOUSAND/UL (ref 0–0.2)
IMM GRANULOCYTES NFR BLD AUTO: 0 % (ref 0–2)
KETONES UR STRIP-MCNC: NEGATIVE MG/DL
LDLC SERPL CALC-MCNC: 116 MG/DL (ref 0–100)
LEUKOCYTE ESTERASE UR QL STRIP: ABNORMAL
LYMPHOCYTES # BLD AUTO: 1.42 THOUSANDS/ΜL (ref 0.6–4.47)
LYMPHOCYTES NFR BLD AUTO: 30 % (ref 14–44)
MAGNESIUM SERPL-MCNC: 2.4 MG/DL (ref 1.6–2.6)
MCH RBC QN AUTO: 30.9 PG (ref 26.8–34.3)
MCHC RBC AUTO-ENTMCNC: 31.7 G/DL (ref 31.4–37.4)
MCV RBC AUTO: 98 FL (ref 82–98)
MONOCYTES # BLD AUTO: 0.45 THOUSAND/ΜL (ref 0.17–1.22)
MONOCYTES NFR BLD AUTO: 10 % (ref 4–12)
MUCOUS THREADS UR QL AUTO: ABNORMAL
NEUTROPHILS # BLD AUTO: 2.43 THOUSANDS/ΜL (ref 1.85–7.62)
NEUTS SEG NFR BLD AUTO: 52 % (ref 43–75)
NITRITE UR QL STRIP: NEGATIVE
NON-SQ EPI CELLS URNS QL MICRO: ABNORMAL /HPF
NRBC BLD AUTO-RTO: 0 /100 WBCS
PH UR STRIP.AUTO: 6 [PH]
PLATELET # BLD AUTO: 321 THOUSANDS/UL (ref 149–390)
PMV BLD AUTO: 10.6 FL (ref 8.9–12.7)
POTASSIUM SERPL-SCNC: 4.9 MMOL/L (ref 3.5–5.3)
PROT SERPL-MCNC: 6.8 G/DL (ref 6.4–8.4)
PROT UR STRIP-MCNC: NEGATIVE MG/DL
RBC # BLD AUTO: 4.53 MILLION/UL (ref 3.81–5.12)
RBC #/AREA URNS AUTO: ABNORMAL /HPF
RENAL EPI CELLS #/AREA URNS HPF: PRESENT /[HPF]
SODIUM SERPL-SCNC: 140 MMOL/L (ref 135–147)
SP GR UR STRIP.AUTO: 1.02 (ref 1–1.03)
TRANS CELLS #/AREA URNS HPF: PRESENT /[HPF]
TRIGL SERPL-MCNC: 103 MG/DL
URATE SERPL-MCNC: 4.8 MG/DL (ref 2–7.5)
UROBILINOGEN UR STRIP-ACNC: <2 MG/DL
WBC # BLD AUTO: 4.68 THOUSAND/UL (ref 4.31–10.16)
WBC #/AREA URNS AUTO: ABNORMAL /HPF

## 2022-08-22 PROCEDURE — 80053 COMPREHEN METABOLIC PANEL: CPT

## 2022-08-22 PROCEDURE — 36415 COLL VENOUS BLD VENIPUNCTURE: CPT

## 2022-08-22 PROCEDURE — 85025 COMPLETE CBC W/AUTO DIFF WBC: CPT

## 2022-08-22 PROCEDURE — 80061 LIPID PANEL: CPT

## 2022-08-22 PROCEDURE — 84550 ASSAY OF BLOOD/URIC ACID: CPT

## 2022-08-22 PROCEDURE — 83735 ASSAY OF MAGNESIUM: CPT

## 2022-08-28 NOTE — ASSESSMENT & PLAN NOTE
Pt is to avoid those substances that precipitate allergic reaction and to use OY+TC antihistamines and/or nasal steroid spray prn

## 2022-08-29 ENCOUNTER — OFFICE VISIT (OUTPATIENT)
Dept: FAMILY MEDICINE CLINIC | Facility: CLINIC | Age: 67
End: 2022-08-29
Payer: MEDICARE

## 2022-08-29 VITALS
WEIGHT: 163 LBS | HEART RATE: 80 BPM | DIASTOLIC BLOOD PRESSURE: 78 MMHG | TEMPERATURE: 97.9 F | SYSTOLIC BLOOD PRESSURE: 160 MMHG | BODY MASS INDEX: 28.88 KG/M2 | OXYGEN SATURATION: 96 % | HEIGHT: 63 IN

## 2022-08-29 DIAGNOSIS — R53.82 CHRONIC FATIGUE: ICD-10-CM

## 2022-08-29 DIAGNOSIS — Z00.00 MEDICARE ANNUAL WELLNESS VISIT, SUBSEQUENT: ICD-10-CM

## 2022-08-29 DIAGNOSIS — Z98.890 HISTORY OF COLONOSCOPY: ICD-10-CM

## 2022-08-29 DIAGNOSIS — J30.1 ALLERGIC RHINITIS DUE TO POLLEN, UNSPECIFIED SEASONALITY: ICD-10-CM

## 2022-08-29 DIAGNOSIS — Z12.31 ENCOUNTER FOR SCREENING MAMMOGRAM FOR BREAST CANCER: Primary | ICD-10-CM

## 2022-08-29 DIAGNOSIS — E78.2 MIXED HYPERLIPIDEMIA: ICD-10-CM

## 2022-08-29 DIAGNOSIS — Z78.0 MENOPAUSE: ICD-10-CM

## 2022-08-29 PROCEDURE — 99214 OFFICE O/P EST MOD 30 MIN: CPT | Performed by: FAMILY MEDICINE

## 2022-08-29 PROCEDURE — G0439 PPPS, SUBSEQ VISIT: HCPCS | Performed by: FAMILY MEDICINE

## 2022-08-29 RX ORDER — MOMETASONE FUROATE 50 UG/1
1 SPRAY, METERED NASAL 2 TIMES DAILY
Qty: 3 ACT | Refills: 1 | Status: SHIPPED | OUTPATIENT
Start: 2022-08-29 | End: 2022-11-27

## 2022-08-29 NOTE — PATIENT INSTRUCTIONS
Medicare Preventive Visit Patient Instructions  Thank you for completing your Welcome to Medicare Visit or Medicare Annual Wellness Visit today  Your next wellness visit will be due in one year (8/30/2023)  The screening/preventive services that you may require over the next 5-10 years are detailed below  Some tests may not apply to you based off risk factors and/or age  Screening tests ordered at today's visit but not completed yet may show as past due  Also, please note that scanned in results may not display below  Preventive Screenings:  Service Recommendations Previous Testing/Comments   Colorectal Cancer Screening  * Colonoscopy    * Fecal Occult Blood Test (FOBT)/Fecal Immunochemical Test (FIT)  * Fecal DNA/Cologuard Test  * Flexible Sigmoidoscopy Age: 39-70 years old   Colonoscopy: every 10 years (may be performed more frequently if at higher risk)  OR  FOBT/FIT: every 1 year  OR  Cologuard: every 3 years  OR  Sigmoidoscopy: every 5 years  Screening may be recommended earlier than age 39 if at higher risk for colorectal cancer  Also, an individualized decision between you and your healthcare provider will decide whether screening between the ages of 74-80 would be appropriate  Colonoscopy: Not on file  FOBT/FIT: Not on file  Cologuard: Not on file  Sigmoidoscopy: Not on file          Breast Cancer Screening Age: 36 years old  Frequency: every 1-2 years  Not required if history of left and right mastectomy Mammogram: Not on file        Cervical Cancer Screening Between the ages of 21-29, pap smear recommended once every 3 years  Between the ages of 33-67, can perform pap smear with HPV co-testing every 5 years     Recommendations may differ for women with a history of total hysterectomy, cervical cancer, or abnormal pap smears in past  Pap Smear: Not on file    Screening Not Indicated   Hepatitis C Screening Once for adults born between [facility]  More frequently in patients at high risk for Hepatitis C Hep C Antibody: 05/06/2021    Screening Current   Diabetes Screening 1-2 times per year if you're at risk for diabetes or have pre-diabetes Fasting glucose: 86 mg/dL (8/22/2022)  A1C: No results in last 5 years (No results in last 5 years)  Screening Current   Cholesterol Screening Once every 5 years if you don't have a lipid disorder  May order more often based on risk factors  Lipid panel: 08/22/2022    Screening Not Indicated  History Lipid Disorder     Other Preventive Screenings Covered by Medicare:  1  Abdominal Aortic Aneurysm (AAA) Screening: covered once if your at risk  You're considered to be at risk if you have a family history of AAA  2  Lung Cancer Screening: covers low dose CT scan once per year if you meet all of the following conditions: (1) Age 50-69; (2) No signs or symptoms of lung cancer; (3) Current smoker or have quit smoking within the last 15 years; (4) You have a tobacco smoking history of at least 20 pack years (packs per day multiplied by number of years you smoked); (5) You get a written order from a healthcare provider  3  Glaucoma Screening: covered annually if you're considered high risk: (1) You have diabetes OR (2) Family history of glaucoma OR (3)  aged 48 and older OR (3)  American aged 72 and older  3  Osteoporosis Screening: covered every 2 years if you meet one of the following conditions: (1) You're estrogen deficient and at risk for osteoporosis based off medical history and other findings; (2) Have a vertebral abnormality; (3) On glucocorticoid therapy for more than 3 months; (4) Have primary hyperparathyroidism; (5) On osteoporosis medications and need to assess response to drug therapy  · Last bone density test (DXA Scan): Not on file  5  HIV Screening: covered annually if you're between the age of 12-76  Also covered annually if you are younger than 13 and older than 72 with risk factors for HIV infection   For pregnant patients, it is covered up to 3 times per pregnancy  Immunizations:  Immunization Recommendations   Influenza Vaccine Annual influenza vaccination during flu season is recommended for all persons aged >= 6 months who do not have contraindications   Pneumococcal Vaccine   * Pneumococcal conjugate vaccine = PCV13 (Prevnar 13), PCV15 (Vaxneuvance), PCV20 (Prevnar 20)  * Pneumococcal polysaccharide vaccine = PPSV23 (Pneumovax) Adults 25-60 years old: 1-3 doses may be recommended based on certain risk factors  Adults 72 years old: 1-2 doses may be recommended based off what pneumonia vaccine you previously received   Hepatitis B Vaccine 3 dose series if at intermediate or high risk (ex: diabetes, end stage renal disease, liver disease)   Tetanus (Td) Vaccine - COST NOT COVERED BY MEDICARE PART B Following completion of primary series, a booster dose should be given every 10 years to maintain immunity against tetanus  Td may also be given as tetanus wound prophylaxis  Tdap Vaccine - COST NOT COVERED BY MEDICARE PART B Recommended at least once for all adults  For pregnant patients, recommended with each pregnancy  Shingles Vaccine (Shingrix) - COST NOT COVERED BY MEDICARE PART B  2 shot series recommended in those aged 48 and above     Health Maintenance Due:      Topic Date Due    Breast Cancer Screening: Mammogram  Never done    Colorectal Cancer Screening  Never done    Hepatitis C Screening  Completed     Immunizations Due:      Topic Date Due    Pneumococcal Vaccine: 65+ Years (2 - PPSV23 or PCV20) 03/15/2020    COVID-19 Vaccine (3 - Booster for Pfizer series) 06/27/2022    Influenza Vaccine (1) 09/01/2022     Advance Directives   What are advance directives? Advance directives are legal documents that state your wishes and plans for medical care  These plans are made ahead of time in case you lose your ability to make decisions for yourself   Advance directives can apply to any medical decision, such as the treatments you want, and if you want to donate organs  What are the types of advance directives? There are many types of advance directives, and each state has rules about how to use them  You may choose a combination of any of the following:  · Living will: This is a written record of the treatment you want  You can also choose which treatments you do not want, which to limit, and which to stop at a certain time  This includes surgery, medicine, IV fluid, and tube feedings  · Durable power of  for healthcare [facility]): This is a written record that states who you want to make healthcare choices for you when you are unable to make them for yourself  This person, called a proxy, is usually a family member or a friend  You may choose more than 1 proxy  · Do not resuscitate (DNR) order:  A DNR order is used in case your heart stops beating or you stop breathing  It is a request not to have certain forms of treatment, such as CPR  A DNR order may be included in other types of advance directives  · Medical directive: This covers the care that you want if you are in a coma, near death, or unable to make decisions for yourself  You can list the treatments you want for each condition  Treatment may include pain medicine, surgery, blood transfusions, dialysis, IV or tube feedings, and a ventilator (breathing machine)  · Values history: This document has questions about your views, beliefs, and how you feel and think about life  This information can help others choose the care that you would choose  Why are advance directives important? An advance directive helps you control your care  Although spoken wishes may be used, it is better to have your wishes written down  Spoken wishes can be misunderstood, or not followed  Treatments may be given even if you do not want them  An advance directive may make it easier for your family to make difficult choices about your care     Fall Prevention    Fall prevention includes ways to make your home and other areas safer  It also includes ways you can move more carefully to prevent a fall  Health conditions that cause changes in your blood pressure, vision, or muscle strength and coordination may increase your risk for falls  Medicines may also increase your risk for falls if they make you dizzy, weak, or sleepy  Fall prevention tips:   · Stand or sit up slowly  · Use assistive devices as directed  · Wear shoes that fit well and have soles that   · Wear a personal alarm  · Stay active  · Manage your medical conditions  Home Safety Tips:  · Add items to prevent falls in the bathroom  · Keep paths clear  · Install bright lights in your home  · Keep items you use often on shelves within reach  · Paint or place reflective tape on the edges of your stairs  Urinary Incontinence   Urinary incontinence (UI)  is when you lose control of your bladder  UI develops because your bladder cannot store or empty urine properly  The 3 most common types of UI are stress incontinence, urge incontinence, or both  Medicines:   · May be given to help strengthen your bladder control  Report any side effects of medication to your healthcare provider  Do pelvic muscle exercises often:  Your pelvic muscles help you stop urinating  Squeeze these muscles tight for 5 seconds, then relax for 5 seconds  Gradually work up to squeezing for 10 seconds  Do 3 sets of 15 repetitions a day, or as directed  This will help strengthen your pelvic muscles and improve bladder control  Train your bladder:  Go to the bathroom at set times, such as every 2 hours, even if you do not feel the urge to go  You can also try to hold your urine when you feel the urge to go  For example, hold your urine for 5 minutes when you feel the urge to go  As that becomes easier, hold your urine for 10 minutes  Self-care:   · Keep a UI record    Write down how often you leak urine and how much you leak  Make a note of what you were doing when you leaked urine  · Drink liquids as directed  You may need to limit the amount of liquid you drink to help control your urine leakage  Do not drink any liquid right before you go to bed  Limit or do not have drinks that contain caffeine or alcohol  · Prevent constipation  Eat a variety of high-fiber foods  Good examples are high-fiber cereals, beans, vegetables, and whole-grain breads  Walking is the best way to trigger your intestines to have a bowel movement  · Exercise regularly and maintain a healthy weight  Weight loss and exercise will decrease pressure on your bladder and help you control your leakage  · Use a catheter as directed  to help empty your bladder  A catheter is a tiny, plastic tube that is put into your bladder to drain your urine  · Go to behavior therapy as directed  Behavior therapy may be used to help you learn to control your urge to urinate  Weight Management   Why it is important to manage your weight:  Being overweight increases your risk of health conditions such as heart disease, high blood pressure, type 2 diabetes, and certain types of cancer  It can also increase your risk for osteoarthritis, sleep apnea, and other respiratory problems  Aim for a slow, steady weight loss  Even a small amount of weight loss can lower your risk of health problems  How to lose weight safely:  A safe and healthy way to lose weight is to eat fewer calories and get regular exercise  You can lose up about 1 pound a week by decreasing the number of calories you eat by 500 calories each day  Healthy meal plan for weight management:  A healthy meal plan includes a variety of foods, contains fewer calories, and helps you stay healthy  A healthy meal plan includes the following:  · Eat whole-grain foods more often  A healthy meal plan should contain fiber  Fiber is the part of grains, fruits, and vegetables that is not broken down by your body  Whole-grain foods are healthy and provide extra fiber in your diet  Some examples of whole-grain foods are whole-wheat breads and pastas, oatmeal, brown rice, and bulgur  · Eat a variety of vegetables every day  Include dark, leafy greens such as spinach, kale, rere greens, and mustard greens  Eat yellow and orange vegetables such as carrots, sweet potatoes, and winter squash  · Eat a variety of fruits every day  Choose fresh or canned fruit (canned in its own juice or light syrup) instead of juice  Fruit juice has very little or no fiber  · Eat low-fat dairy foods  Drink fat-free (skim) milk or 1% milk  Eat fat-free yogurt and low-fat cottage cheese  Try low-fat cheeses such as mozzarella and other reduced-fat cheeses  · Choose meat and other protein foods that are low in fat  Choose beans or other legumes such as split peas or lentils  Choose fish, skinless poultry (chicken or turkey), or lean cuts of red meat (beef or pork)  Before you cook meat or poultry, cut off any visible fat  · Use less fat and oil  Try baking foods instead of frying them  Add less fat, such as margarine, sour cream, regular salad dressing and mayonnaise to foods  Eat fewer high-fat foods  Some examples of high-fat foods include french fries, doughnuts, ice cream, and cakes  · Eat fewer sweets  Limit foods and drinks that are high in sugar  This includes candy, cookies, regular soda, and sweetened drinks  Exercise:  Exercise at least 30 minutes per day on most days of the week  Some examples of exercise include walking, biking, dancing, and swimming  You can also fit in more physical activity by taking the stairs instead of the elevator or parking farther away from stores  Ask your healthcare provider about the best exercise plan for you  © Copyright [employer] 2018 Information is for End User's use only and may not be sold, redistributed or otherwise used for commercial purposes   All illustrations and images included in [facility] are the copyrighted property of [employer]  or Renato Chowdary

## 2022-08-29 NOTE — PROGRESS NOTES
Assessment and Plan:     Problem List Items Addressed This Visit        Respiratory    Allergic rhinitis due to pollen     Pt is to avoid those substances that precipitate allergic reaction and to use OY+TC antihistamines and/or nasal steroid spray prn  Relevant Medications    mometasone (NASONEX) 50 mcg/act nasal spray       Other    Chronic fatigue     Patient is stable  and will continue present plan of care and reassess at next routine visit  All questions about this problem from patient were answered today  Mixed hyperlipidemia     Patient  is stable with current medication and we discussed a low fat low cholesterol diet  Weight loss also discussed for this will help lower cholesterol also  Recheck lipids in 6 months  Other Visit Diagnoses     Encounter for screening mammogram for breast cancer    -  Primary    Relevant Orders    Mammo screening bilateral w 3d & cad    Menopause        Relevant Orders    DXA bone density spine hip and pelvis    Medicare annual wellness visit, subsequent        History of colonoscopy        Relevant Orders    Ambulatory Referral to Gastroenterology           Preventive health issues were discussed with patient, and age appropriate screening tests were ordered as noted in patient's After Visit Summary  Personalized health advice and appropriate referrals for health education or preventive services given if needed, as noted in patient's After Visit Summary  History of Present Illness:     Patient presents for a Medicare Wellness Visit    This 15-year-old female here today for Medicare wellness as well as checkup on multiple medical problems  Patient with hyperlipidemia she is alert her allergic rhinitis due to allergies and also she has a chronic fatigue  The patient has been using phentermine for the chronic fatigue and has been doing well with that although her blood pressure is a little high today    Patient had lab work done her cholesterol is well controlled and rest of her lab work was pretty unremarkable  Patient is due for a mammogram and DEXA scan which was ordered for her today  Patient Care Team:  José Luis Lainez MD as PCP - General (Family Medicine)     Review of Systems:     Review of Systems   Constitutional: Negative for activity change, appetite change, fatigue and fever  HENT: Negative for congestion, ear pain, postnasal drip, rhinorrhea, sinus pressure, sinus pain, sneezing and sore throat  Eyes: Negative for pain and redness  Respiratory: Negative for apnea, cough, chest tightness, shortness of breath and wheezing  Cardiovascular: Negative for chest pain, palpitations and leg swelling  Gastrointestinal: Negative for abdominal pain, constipation, diarrhea, nausea and vomiting  Endocrine: Negative for cold intolerance and heat intolerance  Genitourinary: Negative for difficulty urinating, dysuria, frequency, hematuria and urgency  Musculoskeletal: Negative for arthralgias, back pain, gait problem and myalgias  Skin: Negative for rash  Neurological: Negative for dizziness, speech difficulty, weakness, numbness and headaches  Hematological: Does not bruise/bleed easily  Psychiatric/Behavioral: Negative for agitation, confusion and hallucinations          Problem List:     Patient Active Problem List   Diagnosis    Chronic fatigue    Mixed hyperlipidemia    Allergic rhinitis due to pollen    Postviral fatigue syndrome    Pain of toe of left foot    Tenosynovitis of thumb      Past Medical and Surgical History:     Past Medical History:   Diagnosis Date    Arthritis     hands    Asthma     Obesity      Past Surgical History:   Procedure Laterality Date    JOINT REPLACEMENT      KNEE SURGERY Bilateral     REPLACEMENT    TONSILLECTOMY      TUBAL LIGATION        Family History:     Family History   Problem Relation Age of Onset    Other Mother         maintenance procedure for cardiac pacemaker system     Breast cancer Maternal Grandmother     Breast cancer Paternal Grandmother       Social History:     Social History     Socioeconomic History    Marital status: /Civil Union     Spouse name: None    Number of children: None    Years of education: None    Highest education level: None   Occupational History    Occupation: RETIRED   Tobacco Use    Smoking status: Former Smoker     Packs/day: 1 00     Quit date: 2008     Years since quittin 9    Smokeless tobacco: Never Used   Vaping Use    Vaping Use: Never used   Substance and Sexual Activity    Alcohol use: Yes     Alcohol/week: 2 0 standard drinks     Types: 1 Cans of beer, 1 Standard drinks or equivalent per week    Drug use: Never    Sexual activity: None   Other Topics Concern    None   Social History Narrative    · Do you currently or have you served in the [employer]:   No      · Were you activated, into active duty, as a member of the [employer] or as a Reservist:   No      · Exercise level:   None      · Diet:   Regular      · General stress level:   Low      · ·Caffeine intake: Moderate      · Chewing tobacco:   none      · Seat belts used routinely:   Yes      · Sunscreen used routinely:   No      · Smoke alarm in home: Yes      ·      Social Determinants of Health     Financial Resource Strain: Low Risk     Difficulty of Paying Living Expenses: Not very hard   Food Insecurity: Not on file   Transportation Needs: No Transportation Needs    Lack of Transportation (Medical): No    Lack of Transportation (Non-Medical):  No   Physical Activity: Not on file   Stress: Not on file   Social Connections: Not on file   Intimate Partner Violence: Not on file   Housing Stability: Not on file      Medications and Allergies:     Current Outpatient Medications   Medication Sig Dispense Refill    Elastic Bandages & Supports (Splint Wrist Brace/Left-Right) MISC Use daily at bedtime 1 each 0    ezetimibe (ZETIA) 10 mg tablet TAKE 1 TABLET DAILY 90 tablet 3    mometasone (NASONEX) 50 mcg/act nasal spray 1 spray into each nostril 2 (two) times a day 3 Act 1    naproxen (Naprosyn) 500 mg tablet Take 1 tablet (500 mg total) by mouth 2 (two) times a day with meals 180 tablet 1    phentermine (ADIPEX-P) 37 5 MG tablet TAKE ONE-HALF (1/2) TABLET DAILY 45 tablet 0     No current facility-administered medications for this visit  No Known Allergies   Immunizations:     Immunization History   Administered Date(s) Administered    COVID-19 PFIZER VACCINE 0 3 ML IM 01/06/2022, 01/27/2022    INFLUENZA 12/10/2018    Pneumococcal Conjugate 13-Valent 12/10/2018    Tdap 07/24/2020      Health Maintenance:         Topic Date Due    Breast Cancer Screening: Mammogram  Never done    Colorectal Cancer Screening  Never done    Hepatitis C Screening  Completed         Topic Date Due    Pneumococcal Vaccine: 65+ Years (2 - PPSV23 or PCV20) 03/15/2020    COVID-19 Vaccine (3 - Booster for Matthews Peter series) 06/27/2022    Influenza Vaccine (1) 09/01/2022      Medicare Screening Tests and Risk Assessments:     Magali is here for her Subsequent Wellness visit  Last Medicare Wellness visit information reviewed, patient interviewed and updates made to the record today  Health Risk Assessment:   Patient rates overall health as good  Patient feels that their physical health rating is same  Patient is satisfied with their life  Eyesight was rated as slightly worse  Hearing was rated as same  Patient feels that their emotional and mental health rating is same  Patients states they are never, rarely angry  Patient states they are never, rarely unusually tired/fatigued  Pain experienced in the last 7 days has been some  Patient's pain rating has been 2/10  Patient states that she has experienced weight loss or gain in last 6 months  Fall Risk Screening:    In the past year, patient has experienced: history of falling in past year    Number of falls: 1  Injured during fall?: Yes    Feels unsteady when standing or walking?: No    Worried about falling?: No      Urinary Incontinence Screening:   Patient has leaked urine accidently in the last six months  Home Safety:  Patient does not have trouble with stairs inside or outside of their home  Patient has no working smoke alarms and has no working carbon monoxide detector  Home safety hazards include: unsecured electrical cords  Nutrition:   Current diet is Regular  Medications:   Patient is currently taking over-the-counter supplements  OTC medications include: see medication list  Patient is able to manage medications  Activities of Daily Living (ADLs)/Instrumental Activities of Daily Living (IADLs):   Walk and transfer into and out of bed and chair?: Yes  Dress and groom yourself?: Yes    Bathe or shower yourself?: Yes    Feed yourself? Yes  Do your laundry/housekeeping?: Yes  Manage your money, pay your bills and track your expenses?: Yes  Make your own meals?: Yes    Do your own shopping?: Yes    Previous Hospitalizations:   Any hospitalizations or ED visits within the last 12 months?: Yes    How many hospitalizations have you had in the last year?: 1-2    Advance Care Planning:   Living will: No    Durable POA for healthcare: No      PREVENTIVE SCREENINGS      Cardiovascular Screening:    General: Screening Not Indicated and History Lipid Disorder      Diabetes Screening:     General: Screening Current      Cervical Cancer Screening:    General: Screening Not Indicated      Lung Cancer Screening:     General: Screening Not Indicated      Hepatitis C Screening:    General: Screening Current    Screening, Brief Intervention, and Referral to Treatment (SBIRT)    Screening      AUDIT-C Screenin) How often did you have a drink containing alcohol in the past year? 2 to 4 times a month  2) How many drinks did you have on a typical day when you were drinking in the past year?  1 to 2  3) How often did you have 6 or more drinks on one occasion in the past year? never    AUDIT-C Score: 2  Interpretation: Score 0-2 (female): Negative screen for alcohol misuse    Single Item Drug Screening:  How often have you used an illegal drug (including marijuana) or a prescription medication for non-medical reasons in the past year? never    Single Item Drug Screen Score: 0  Interpretation: Negative screen for possible drug use disorder    No exam data present     Physical Exam:     /78 (BP Location: Left arm, Patient Position: Sitting, Cuff Size: Large)   Pulse 80   Temp 97 9 °F (36 6 °C)   Ht 5' 3" (1 6 m)   Wt 73 9 kg (163 lb)   SpO2 96%   BMI 28 87 kg/m²     Physical Exam  Constitutional:       Appearance: Normal appearance  She is not ill-appearing  HENT:      Head: Normocephalic and atraumatic  Right Ear: Tympanic membrane normal       Left Ear: Tympanic membrane normal       Nose: Nose normal       Mouth/Throat:      Mouth: Mucous membranes are moist    Eyes:      Extraocular Movements: Extraocular movements intact  Conjunctiva/sclera: Conjunctivae normal       Pupils: Pupils are equal, round, and reactive to light  Cardiovascular:      Rate and Rhythm: Normal rate and regular rhythm  Pulmonary:      Effort: Pulmonary effort is normal  No respiratory distress  Breath sounds: Normal breath sounds  No wheezing  Abdominal:      General: Bowel sounds are normal       Palpations: Abdomen is soft  Tenderness: There is no abdominal tenderness  Musculoskeletal:         General: No tenderness  Normal range of motion  Cervical back: Normal range of motion and neck supple  Right lower leg: No edema  Left lower leg: No edema  Skin:     General: Skin is warm and dry  Neurological:      Mental Status: She is alert and oriented to person, place, and time     Psychiatric:         Mood and Affect: Mood normal          Behavior: Behavior normal          Thought Content: Thought content normal          Judgment: Judgment normal           Erika Goel MD

## 2022-09-12 ENCOUNTER — TELEPHONE (OUTPATIENT)
Dept: FAMILY MEDICINE CLINIC | Facility: CLINIC | Age: 67
End: 2022-09-12

## 2022-09-12 DIAGNOSIS — U07.1 COVID-19: Primary | ICD-10-CM

## 2022-09-12 DIAGNOSIS — U07.1 COVID-19: ICD-10-CM

## 2022-09-12 NOTE — TELEPHONE ENCOUNTER
Pt  called, she said she tested positive for Covid today, she is wondering if she needs an antiviral    If so she uses [facility] in [place]

## 2022-09-30 ENCOUNTER — TELEPHONE (OUTPATIENT)
Dept: GASTROENTEROLOGY | Facility: CLINIC | Age: 67
End: 2022-09-30

## 2022-09-30 DIAGNOSIS — G93.31 POSTVIRAL FATIGUE SYNDROME: ICD-10-CM

## 2022-09-30 RX ORDER — PHENTERMINE HYDROCHLORIDE 37.5 MG/1
TABLET ORAL
Qty: 45 TABLET | Refills: 0 | Status: SHIPPED | OUTPATIENT
Start: 2022-09-30

## 2022-10-16 NOTE — PROGRESS NOTES
Name: Sen Franco      : 1955      MRN: [id number]  Encounter Provider: Tej Hammer MD  Encounter Date: 10/17/2022   Encounter department: [address] [place] [address] Place     1  Immunization due  -     influenza vaccine, high-dose, PF 0 7 mL (FLUZONE HIGH-DOSE)  -     Pneumococcal Conjugate Vaccine 20-valent (Pcv20)    2  Allergic rhinitis due to pollen, unspecified seasonality  Assessment & Plan:  Patient to continue utilizing medical therapy as well as counseling sources as applicable to condition  If suicidal thought or fear of suicide attempt, to call 911 and seek help immediately  Medications and therapy reviewed today and all patient  questions answered today  3  Mixed hyperlipidemia  Assessment & Plan:  Patient  is stable with current medication and we discussed a low fat low cholesterol diet  Weight loss also discussed for this will help lower cholesterol also  Recheck lipids in 6 months  Orders:  -     Comprehensive metabolic panel; Future  -     Lipid Panel with Direct LDL reflex; Future    4  Chronic fatigue  Assessment & Plan:  Patient is stable  and will continue present plan of care and reassess at next routine visit  All questions about this problem from patient were answered today  5  Encounter for immunization        Depression Screening and Follow-up Plan: Patient was screened for depression during today's encounter  They screened negative with a PHQ-2 score of 0  Falls Plan of Care: balance, strength, and gait training instructions were provided  Home safety education provided  Urinary Incontinence Plan of Care: counseling topics discussed: practice Kegel (pelvic floor strengthening) exercises, use restroom every 2 hours, limit alcohol, caffeine, spicy foods, and acidic foods, limiting fluid intake 3-4 hours before bed, weight loss, preventing constipation, improving blood sugar control and limiting fluid intake to 60 oz  per day  Subjective     This 49-year-old female here today for checkup on multiple medical problems patient for recheck on her blood pressure today which is doing very well also she has hyperlipidemia and she is not due for lab work until February  Patient recently with COVID about 3 4 weeks ago and is doing very well with that  Patient will no sequelae of that she does have some weight loss but she had no appetite when she COVID  Patient also needs a flu shot and she will get the Prevnar 20 Pneumovax 23 to complete her series for her immunization for pneumonia  Review of Systems   Constitutional: Negative for activity change, appetite change, fatigue and fever  HENT: Negative for congestion, ear pain, postnasal drip, rhinorrhea, sinus pressure, sinus pain, sneezing and sore throat  Eyes: Negative for pain and redness  Respiratory: Negative for apnea, cough, chest tightness, shortness of breath and wheezing  Cardiovascular: Negative for chest pain, palpitations and leg swelling  Gastrointestinal: Negative for abdominal pain, constipation, diarrhea, nausea and vomiting  Endocrine: Negative for cold intolerance and heat intolerance  Genitourinary: Negative for difficulty urinating, dysuria, frequency, hematuria and urgency  Musculoskeletal: Negative for arthralgias, back pain, gait problem and myalgias  Skin: Negative for rash  Neurological: Negative for dizziness, speech difficulty, weakness, numbness and headaches  Hematological: Does not bruise/bleed easily  Psychiatric/Behavioral: Negative for agitation, confusion and hallucinations         Past Medical History:   Diagnosis Date   • Allergic rhinitis due to pollen 9/18/2020   • Arthritis     hands   • Asthma    • Obesity      Past Surgical History:   Procedure Laterality Date   • JOINT REPLACEMENT     • KNEE SURGERY Bilateral     REPLACEMENT   • TONSILLECTOMY     • TUBAL LIGATION       Family History   Problem Relation Age of Onset • Other Mother         maintenance procedure for cardiac pacemaker system    • Breast cancer Maternal Grandmother    • Breast cancer Paternal Grandmother      Social History     Socioeconomic History   • Marital status: /Civil Union     Spouse name: None   • Number of children: None   • Years of education: None   • Highest education level: None   Occupational History   • Occupation: RETIRED   Tobacco Use   • Smoking status: Former Smoker     Packs/day: 1 00     Quit date: 2008     Years since quittin 0   • Smokeless tobacco: Never Used   Vaping Use   • Vaping Use: Never used   Substance and Sexual Activity   • Alcohol use: Yes     Alcohol/week: 2 0 standard drinks     Types: 1 Cans of beer, 1 Standard drinks or equivalent per week   • Drug use: Never   • Sexual activity: None   Other Topics Concern   • None   Social History Narrative    · Do you currently or have you served in the [employer]   No      · Were you activated, into active duty, as a member of the [employer] or as a Reservist:   No      · Exercise level:   None      · Diet:   Regular      · General stress level:   Low      · ·Caffeine intake: Moderate      · Chewing tobacco:   none      · Seat belts used routinely:   Yes      · Sunscreen used routinely:   No      · Smoke alarm in home: Yes      ·      Social Determinants of Health     Financial Resource Strain: Low Risk    • Difficulty of Paying Living Expenses: Not very hard   Food Insecurity: Not on file   Transportation Needs: No Transportation Needs   • Lack of Transportation (Medical): No   • Lack of Transportation (Non-Medical):  No   Physical Activity: Not on file   Stress: Not on file   Social Connections: Not on file   Intimate Partner Violence: Not on file   Housing Stability: Not on file     Current Outpatient Medications on File Prior to Visit   Medication Sig   • Elastic Bandages & Supports (Splint Wrist Brace/Left-Right) MISC Use daily at bedtime   • ezetimibe (ZETIA) 10 mg tablet TAKE 1 TABLET DAILY   • mometasone (NASONEX) 50 mcg/act nasal spray 1 spray into each nostril 2 (two) times a day   • naproxen (Naprosyn) 500 mg tablet Take 1 tablet (500 mg total) by mouth 2 (two) times a day with meals   • phentermine (ADIPEX-P) 37 5 MG tablet TAKE ONE-HALF (1/2) TABLET DAILY     No Known Allergies  Immunization History   Administered Date(s) Administered   • COVID-19 PFIZER VACCINE 0 3 ML IM 01/06/2022, 01/27/2022   • INFLUENZA 12/10/2018   • Influenza, high dose seasonal 0 7 mL 10/17/2022   • Pneumococcal Conjugate 13-Valent 12/10/2018   • Pneumococcal Conjugate Vaccine 20-valent (Pcv20), Polysace 10/17/2022   • Tdap 07/24/2020       Objective     /74 (BP Location: Left arm, Patient Position: Sitting, Cuff Size: Standard)   Pulse 82   Temp (!) 97 3 °F (36 3 °C)   Resp 16   Ht 5' 3" (1 6 m)   Wt 72 kg (158 lb 12 8 oz)   SpO2 96%   BMI 28 13 kg/m²     Physical Exam  Vitals and nursing note reviewed  Constitutional:       Appearance: She is well-developed  HENT:      Head: Normocephalic and atraumatic  Nose: Nose normal    Eyes:      General: No scleral icterus  Conjunctiva/sclera: Conjunctivae normal       Pupils: Pupils are equal, round, and reactive to light  Neck:      Thyroid: No thyromegaly  Cardiovascular:      Rate and Rhythm: Normal rate and regular rhythm  Pulmonary:      Effort: Pulmonary effort is normal       Breath sounds: Normal breath sounds  No wheezing  Abdominal:      General: Bowel sounds are normal  There is no distension  Palpations: Abdomen is soft  Tenderness: There is no abdominal tenderness  There is no guarding or rebound  Musculoskeletal:         General: No tenderness or deformity  Normal range of motion  Cervical back: Normal range of motion and neck supple  Skin:     General: Skin is warm and dry  Findings: No erythema or rash     Neurological:      Mental Status: She is alert and oriented to person, place, and time  Sensory: No sensory deficit  Psychiatric:         Behavior: Behavior normal          Thought Content:  Thought content normal          Judgment: Judgment normal        Jeremy Grimaldo MD

## 2022-10-17 ENCOUNTER — OFFICE VISIT (OUTPATIENT)
Dept: FAMILY MEDICINE CLINIC | Facility: CLINIC | Age: 67
End: 2022-10-17
Payer: MEDICARE

## 2022-10-17 VITALS
BODY MASS INDEX: 28.14 KG/M2 | RESPIRATION RATE: 16 BRPM | HEART RATE: 82 BPM | DIASTOLIC BLOOD PRESSURE: 74 MMHG | SYSTOLIC BLOOD PRESSURE: 142 MMHG | HEIGHT: 63 IN | TEMPERATURE: 97.3 F | OXYGEN SATURATION: 96 % | WEIGHT: 158.8 LBS

## 2022-10-17 DIAGNOSIS — Z23 ENCOUNTER FOR IMMUNIZATION: ICD-10-CM

## 2022-10-17 DIAGNOSIS — R53.82 CHRONIC FATIGUE: ICD-10-CM

## 2022-10-17 DIAGNOSIS — Z23 IMMUNIZATION DUE: Primary | ICD-10-CM

## 2022-10-17 DIAGNOSIS — E78.2 MIXED HYPERLIPIDEMIA: ICD-10-CM

## 2022-10-17 DIAGNOSIS — J30.1 ALLERGIC RHINITIS DUE TO POLLEN, UNSPECIFIED SEASONALITY: ICD-10-CM

## 2022-10-17 PROCEDURE — 90677 PCV20 VACCINE IM: CPT | Performed by: FAMILY MEDICINE

## 2022-10-17 PROCEDURE — 99214 OFFICE O/P EST MOD 30 MIN: CPT | Performed by: FAMILY MEDICINE

## 2022-10-17 PROCEDURE — G0008 ADMIN INFLUENZA VIRUS VAC: HCPCS | Performed by: FAMILY MEDICINE

## 2022-10-17 PROCEDURE — 90662 IIV NO PRSV INCREASED AG IM: CPT | Performed by: FAMILY MEDICINE

## 2022-10-17 PROCEDURE — G0009 ADMIN PNEUMOCOCCAL VACCINE: HCPCS | Performed by: FAMILY MEDICINE

## 2022-12-16 DIAGNOSIS — E78.2 MIXED HYPERLIPIDEMIA: ICD-10-CM

## 2022-12-16 DIAGNOSIS — G93.31 POSTVIRAL FATIGUE SYNDROME: ICD-10-CM

## 2022-12-16 RX ORDER — PHENTERMINE HYDROCHLORIDE 37.5 MG/1
TABLET ORAL
Qty: 45 TABLET | Refills: 0 | Status: SHIPPED | OUTPATIENT
Start: 2022-12-16

## 2022-12-16 RX ORDER — EZETIMIBE 10 MG/1
TABLET ORAL
Qty: 90 TABLET | Refills: 3 | Status: SHIPPED | OUTPATIENT
Start: 2022-12-16

## 2023-01-26 DIAGNOSIS — M65.9 TENOSYNOVITIS OF THUMB: ICD-10-CM

## 2023-01-26 RX ORDER — NAPROXEN 500 MG/1
TABLET ORAL
Qty: 180 TABLET | Refills: 3 | Status: SHIPPED | OUTPATIENT
Start: 2023-01-26

## 2023-03-20 DIAGNOSIS — G93.31 POSTVIRAL FATIGUE SYNDROME: ICD-10-CM

## 2023-03-20 RX ORDER — PHENTERMINE HYDROCHLORIDE 37.5 MG/1
18.75 TABLET ORAL DAILY
Qty: 45 TABLET | Refills: 0 | Status: SHIPPED | OUTPATIENT
Start: 2023-03-20

## 2023-05-18 DIAGNOSIS — J30.1 ALLERGIC RHINITIS DUE TO POLLEN, UNSPECIFIED SEASONALITY: ICD-10-CM

## 2023-05-18 RX ORDER — MOMETASONE FUROATE 50 UG/1
SPRAY, METERED NASAL
Qty: 51 G | Refills: 3 | Status: SHIPPED | OUTPATIENT
Start: 2023-05-18

## 2023-06-07 DIAGNOSIS — G93.31 POSTVIRAL FATIGUE SYNDROME: ICD-10-CM

## 2023-06-07 RX ORDER — PHENTERMINE HYDROCHLORIDE 37.5 MG/1
TABLET ORAL
Qty: 45 TABLET | Refills: 0 | Status: SHIPPED | OUTPATIENT
Start: 2023-06-07

## 2023-09-12 DIAGNOSIS — G93.31 POSTVIRAL FATIGUE SYNDROME: ICD-10-CM

## 2023-09-12 RX ORDER — PHENTERMINE HYDROCHLORIDE 37.5 MG/1
TABLET ORAL
Qty: 45 TABLET | Refills: 0 | Status: SHIPPED | OUTPATIENT
Start: 2023-09-12

## 2023-10-11 ENCOUNTER — TELEPHONE (OUTPATIENT)
Age: 68
End: 2023-10-11

## 2023-10-11 DIAGNOSIS — R53.82 CHRONIC FATIGUE: Primary | ICD-10-CM

## 2023-10-11 DIAGNOSIS — E78.2 MIXED HYPERLIPIDEMIA: ICD-10-CM

## 2023-10-11 DIAGNOSIS — Z13.220 SCREENING CHOLESTEROL LEVEL: ICD-10-CM

## 2023-10-11 NOTE — TELEPHONE ENCOUNTER
Patient would like blood work done before her upcoming appt. Could MD order same and let patient know when in chart. Thank you.

## 2023-10-13 ENCOUNTER — APPOINTMENT (OUTPATIENT)
Dept: LAB | Facility: MEDICAL CENTER | Age: 68
End: 2023-10-13
Payer: MEDICARE

## 2023-10-13 DIAGNOSIS — R53.82 CHRONIC FATIGUE: ICD-10-CM

## 2023-10-13 DIAGNOSIS — E78.2 MIXED HYPERLIPIDEMIA: ICD-10-CM

## 2023-10-13 LAB
ALBUMIN SERPL BCP-MCNC: 3.9 G/DL (ref 3.5–5)
ALP SERPL-CCNC: 100 U/L (ref 34–104)
ALT SERPL W P-5'-P-CCNC: 19 U/L (ref 7–52)
ANION GAP SERPL CALCULATED.3IONS-SCNC: 8 MMOL/L
AST SERPL W P-5'-P-CCNC: 22 U/L (ref 13–39)
BASOPHILS # BLD AUTO: 0.06 THOUSANDS/ÂΜL (ref 0–0.1)
BASOPHILS NFR BLD AUTO: 1 % (ref 0–1)
BILIRUB SERPL-MCNC: 0.4 MG/DL (ref 0.2–1)
BILIRUB UR QL STRIP: NEGATIVE
BUN SERPL-MCNC: 14 MG/DL (ref 5–25)
CALCIUM SERPL-MCNC: 9.2 MG/DL (ref 8.4–10.2)
CHLORIDE SERPL-SCNC: 105 MMOL/L (ref 96–108)
CHOLEST SERPL-MCNC: 211 MG/DL
CLARITY UR: CLEAR
CO2 SERPL-SCNC: 28 MMOL/L (ref 21–32)
COLOR UR: COLORLESS
CREAT SERPL-MCNC: 0.61 MG/DL (ref 0.6–1.3)
EOSINOPHIL # BLD AUTO: 0.43 THOUSAND/ÂΜL (ref 0–0.61)
EOSINOPHIL NFR BLD AUTO: 8 % (ref 0–6)
ERYTHROCYTE [DISTWIDTH] IN BLOOD BY AUTOMATED COUNT: 13.8 % (ref 11.6–15.1)
GFR SERPL CREATININE-BSD FRML MDRD: 93 ML/MIN/1.73SQ M
GLUCOSE P FAST SERPL-MCNC: 87 MG/DL (ref 65–99)
GLUCOSE UR STRIP-MCNC: NEGATIVE MG/DL
HCT VFR BLD AUTO: 42.4 % (ref 34.8–46.1)
HDLC SERPL-MCNC: 64 MG/DL
HGB BLD-MCNC: 14.1 G/DL (ref 11.5–15.4)
HGB UR QL STRIP.AUTO: NEGATIVE
IMM GRANULOCYTES # BLD AUTO: 0.02 THOUSAND/UL (ref 0–0.2)
IMM GRANULOCYTES NFR BLD AUTO: 0 % (ref 0–2)
KETONES UR STRIP-MCNC: NEGATIVE MG/DL
LDLC SERPL CALC-MCNC: 123 MG/DL (ref 0–100)
LEUKOCYTE ESTERASE UR QL STRIP: NEGATIVE
LYMPHOCYTES # BLD AUTO: 1.25 THOUSANDS/ÂΜL (ref 0.6–4.47)
LYMPHOCYTES NFR BLD AUTO: 24 % (ref 14–44)
MAGNESIUM SERPL-MCNC: 2.2 MG/DL (ref 1.9–2.7)
MCH RBC QN AUTO: 32.5 PG (ref 26.8–34.3)
MCHC RBC AUTO-ENTMCNC: 33.3 G/DL (ref 31.4–37.4)
MCV RBC AUTO: 98 FL (ref 82–98)
MONOCYTES # BLD AUTO: 0.51 THOUSAND/ÂΜL (ref 0.17–1.22)
MONOCYTES NFR BLD AUTO: 10 % (ref 4–12)
NEUTROPHILS # BLD AUTO: 2.99 THOUSANDS/ÂΜL (ref 1.85–7.62)
NEUTS SEG NFR BLD AUTO: 57 % (ref 43–75)
NITRITE UR QL STRIP: NEGATIVE
NRBC BLD AUTO-RTO: 0 /100 WBCS
PH UR STRIP.AUTO: 6 [PH]
PLATELET # BLD AUTO: 281 THOUSANDS/UL (ref 149–390)
PMV BLD AUTO: 10.1 FL (ref 8.9–12.7)
POTASSIUM SERPL-SCNC: 4.6 MMOL/L (ref 3.5–5.3)
PROT SERPL-MCNC: 6.4 G/DL (ref 6.4–8.4)
PROT UR STRIP-MCNC: NEGATIVE MG/DL
RBC # BLD AUTO: 4.34 MILLION/UL (ref 3.81–5.12)
SODIUM SERPL-SCNC: 141 MMOL/L (ref 135–147)
SP GR UR STRIP.AUTO: 1.01 (ref 1–1.03)
TRIGL SERPL-MCNC: 122 MG/DL
TSH SERPL DL<=0.05 MIU/L-ACNC: 2.24 UIU/ML (ref 0.45–4.5)
URATE SERPL-MCNC: 4.5 MG/DL (ref 2–7.5)
UROBILINOGEN UR STRIP-ACNC: <2 MG/DL
WBC # BLD AUTO: 5.26 THOUSAND/UL (ref 4.31–10.16)

## 2023-10-13 PROCEDURE — 84443 ASSAY THYROID STIM HORMONE: CPT

## 2023-10-13 PROCEDURE — 80061 LIPID PANEL: CPT

## 2023-10-13 PROCEDURE — 36415 COLL VENOUS BLD VENIPUNCTURE: CPT

## 2023-10-13 PROCEDURE — 81003 URINALYSIS AUTO W/O SCOPE: CPT

## 2023-10-13 PROCEDURE — 83735 ASSAY OF MAGNESIUM: CPT

## 2023-10-13 PROCEDURE — 85025 COMPLETE CBC W/AUTO DIFF WBC: CPT

## 2023-10-13 PROCEDURE — 80053 COMPREHEN METABOLIC PANEL: CPT

## 2023-10-13 PROCEDURE — 84550 ASSAY OF BLOOD/URIC ACID: CPT

## 2023-10-16 ENCOUNTER — RA CDI HCC (OUTPATIENT)
Dept: OTHER | Facility: HOSPITAL | Age: 68
End: 2023-10-16

## 2023-10-20 ENCOUNTER — OFFICE VISIT (OUTPATIENT)
Dept: FAMILY MEDICINE CLINIC | Facility: CLINIC | Age: 68
End: 2023-10-20
Payer: MEDICARE

## 2023-10-20 VITALS
TEMPERATURE: 97.9 F | OXYGEN SATURATION: 99 % | DIASTOLIC BLOOD PRESSURE: 76 MMHG | SYSTOLIC BLOOD PRESSURE: 144 MMHG | BODY MASS INDEX: 29.59 KG/M2 | HEIGHT: 63 IN | WEIGHT: 167 LBS | HEART RATE: 99 BPM

## 2023-10-20 DIAGNOSIS — M25.511 CHRONIC RIGHT SHOULDER PAIN: ICD-10-CM

## 2023-10-20 DIAGNOSIS — Z12.11 SCREEN FOR COLON CANCER: ICD-10-CM

## 2023-10-20 DIAGNOSIS — Z00.00 WELL ADULT EXAM: Primary | ICD-10-CM

## 2023-10-20 DIAGNOSIS — Z78.0 MENOPAUSE: ICD-10-CM

## 2023-10-20 DIAGNOSIS — R53.82 CHRONIC FATIGUE: ICD-10-CM

## 2023-10-20 DIAGNOSIS — E78.2 MIXED HYPERLIPIDEMIA: ICD-10-CM

## 2023-10-20 DIAGNOSIS — G93.31 POSTVIRAL FATIGUE SYNDROME: ICD-10-CM

## 2023-10-20 DIAGNOSIS — J30.1 ALLERGIC RHINITIS DUE TO POLLEN, UNSPECIFIED SEASONALITY: ICD-10-CM

## 2023-10-20 DIAGNOSIS — Z12.31 SCREENING MAMMOGRAM FOR HIGH-RISK PATIENT: ICD-10-CM

## 2023-10-20 DIAGNOSIS — G89.29 CHRONIC RIGHT SHOULDER PAIN: ICD-10-CM

## 2023-10-20 DIAGNOSIS — Z23 ENCOUNTER FOR IMMUNIZATION: ICD-10-CM

## 2023-10-20 PROCEDURE — G0439 PPPS, SUBSEQ VISIT: HCPCS | Performed by: FAMILY MEDICINE

## 2023-10-20 PROCEDURE — 99214 OFFICE O/P EST MOD 30 MIN: CPT | Performed by: FAMILY MEDICINE

## 2023-10-20 PROCEDURE — 90662 IIV NO PRSV INCREASED AG IM: CPT | Performed by: FAMILY MEDICINE

## 2023-10-20 PROCEDURE — G0008 ADMIN INFLUENZA VIRUS VAC: HCPCS | Performed by: FAMILY MEDICINE

## 2023-10-20 NOTE — PROGRESS NOTES
Name: Maricarmen Villa      : 1955      MRN: [id number]  Encounter Provider: Tessa Trinh MD  Encounter Date: 10/20/2023   Encounter department: [facility] [address]     1. Well adult exam    2. Allergic rhinitis due to pollen, unspecified seasonality  Assessment & Plan:  Pt is to avoid those substances that precipitate allergic reaction and to use OY+TC antihistamines and/or nasal steroid spray prn.       3. Chronic fatigue  Assessment & Plan:  Patient is stable  and will continue present plan of care and reassess at next routine visit. All questions about this problem from patient were answered today. 4. Mixed hyperlipidemia  Assessment & Plan:  Patient  is stable with current medication and we discussed a low fat low cholesterol diet. Weight loss also discussed for this will help lower cholesterol also. Recheck lipids in 6 months. Orders:  -     Comprehensive metabolic panel; Future  -     Lipid Panel with Direct LDL reflex; Future    5. Encounter for immunization  -     influenza vaccine, high-dose, PF 0.7 mL (FLUZONE HIGH-DOSE)    6. Postviral fatigue syndrome    7. Screen for colon cancer  -     Cologuard    8. Screening mammogram for high-risk patient  -     Mammo screening bilateral w 3d & cad; Future; Expected date: 10/20/2023    9. Menopause  -     DXA bone density spine hip and pelvis; Future; Expected date: 10/20/2023    10. Chronic right shoulder pain  -     XR shoulder 2+ vw right; Future; Expected date: 10/20/2023  -     MRI shoulder right wo contrast; Future; Expected date: 10/20/2023        Depression Screening and Follow-up Plan: Patient was screened for depression during today's encounter. They screened negative with a PHQ-2 score of 0. Falls Plan of Care: balance, strength, and gait training instructions were provided. Home safety education provided.      Urinary Incontinence Plan of Care: counseling topics discussed: domo Lee (pelvic floor strengthening) exercises, use restroom every 2 hours, limit alcohol, caffeine, spicy foods, and acidic foods, limiting fluid intake 3-4 hours before bed, weight loss, preventing constipation and limiting fluid intake to 60 oz. per day. Subjective     60-year-old female here today for checkup for Medicare wellness as well as checkup on multiple medical problems. Patient with some chronic fatigue she has history of allergic rhinitis as well as hyperlipidemia. Patient also has an acute problem in her right shoulder. Patient's been having a lot of pain and discomfort in the back of her shoulder as well as laterally in the deltoid area she does have some discomfort with abduction and 90 degrees she does have some problems taking her arm above her head as well as behind her back. Patient appears to have a classic rotator cuff injury we will see about getting an x-ray of her right shoulder and then we will see about doing the MRI to show any soft tissue injury if necessary. Review of Systems   Constitutional:  Negative for activity change, appetite change, fatigue and fever. HENT:  Negative for congestion, ear pain, postnasal drip, rhinorrhea, sinus pressure, sinus pain, sneezing and sore throat. Eyes:  Negative for pain and redness. Respiratory:  Negative for apnea, cough, chest tightness, shortness of breath and wheezing. Cardiovascular:  Negative for chest pain, palpitations and leg swelling. Gastrointestinal:  Negative for abdominal pain, constipation, diarrhea, nausea and vomiting. Endocrine: Negative for cold intolerance and heat intolerance. Genitourinary:  Negative for difficulty urinating, dysuria, frequency, hematuria and urgency. Musculoskeletal:  Negative for arthralgias, back pain, gait problem and myalgias. Skin:  Negative for rash. Neurological:  Positive for weakness. Negative for dizziness, speech difficulty, numbness and headaches.    Hematological:  Does not bruise/bleed easily. Psychiatric/Behavioral:  Negative for agitation, confusion and hallucinations. Past Medical History:   Diagnosis Date   • Allergic 1970   • Allergic rhinitis due to pollen 09/18/2020   • Arthritis     hands   • Asthma    • Obesity      Past Surgical History:   Procedure Laterality Date   • JOINT REPLACEMENT     • KNEE SURGERY Bilateral     REPLACEMENT   • TONSILLECTOMY     • TUBAL LIGATION       Family History   Problem Relation Age of Onset   • Other Mother         maintenance procedure for cardiac pacemaker system    • Breast cancer Maternal Grandmother    • Breast cancer Paternal Grandmother      Social History     Socioeconomic History   • Marital status: /Civil Union     Spouse name: None   • Number of children: None   • Years of education: None   • Highest education level: None   Occupational History   • Occupation: RETIRED   Tobacco Use   • Smoking status: Former     Packs/day: 1.00     Years: 30.00     Total pack years: 30.00     Types: Cigarettes     Start date: 6/6/1978     Quit date: 9/18/2008     Years since quitting: 15.0   • Smokeless tobacco: Never   Vaping Use   • Vaping Use: Never used   Substance and Sexual Activity   • Alcohol use: Yes     Alcohol/week: 2.0 standard drinks of alcohol     Types: 1 Cans of beer, 1 Standard drinks or equivalent per week   • Drug use: Never   • Sexual activity: Yes     Partners: Male     Birth control/protection: Surgical   Other Topics Concern   • None   Social History Narrative    · Do you currently or have you served in the [address] [place] [address] [employer]:   No      · Were you activated, into active duty, as a member of the [employer] or as a Reservist:   No      · Exercise level:   None      · Diet:   Regular      · General stress level:   Low      · ·Caffeine intake: Moderate      · Chewing tobacco:   none      · Seat belts used routinely:   Yes      · Sunscreen used routinely:   No      · Smoke alarm in home:    Yes      · Social Determinants of Health     Financial Resource Strain: Low Risk  (10/20/2023)    Overall Financial Resource Strain (CARDIA)    • Difficulty of Paying Living Expenses: Not very hard   Food Insecurity: Not on file   Transportation Needs: No Transportation Needs (10/20/2023)    PRAPARE - Transportation    • Lack of Transportation (Medical): No    • Lack of Transportation (Non-Medical): No   Physical Activity: Not on file   Stress: Not on file   Social Connections: Not on file   Intimate Partner Violence: Not on file   Housing Stability: Not on file     Current Outpatient Medications on File Prior to Visit   Medication Sig   • ezetimibe (ZETIA) 10 mg tablet TAKE 1 TABLET DAILY   • mometasone (NASONEX) 50 mcg/act nasal spray USE 1 SPRAY IN EACH NOSTRIL TWICE A DAY   • naproxen (NAPROSYN) 500 mg tablet TAKE 1 TABLET TWICE A DAY WITH MEALS   • phentermine (ADIPEX-P) 37.5 MG tablet TAKE ONE-HALF (1/2) TABLET DAILY   • Elastic Bandages & Supports (Splint Wrist Brace/Left-Right) MISC Use daily at bedtime     No Known Allergies  Immunization History   Administered Date(s) Administered   • COVID-19 PFIZER VACCINE 0.3 ML IM 01/06/2022, 01/27/2022   • INFLUENZA 12/10/2018   • Influenza, high dose seasonal 0.7 mL 10/17/2022   • Pneumococcal Conjugate 13-Valent 12/10/2018   • Pneumococcal Conjugate Vaccine 20-valent (Pcv20), Polysace 10/17/2022   • Tdap 07/24/2020       Objective     /76 (BP Location: Left arm, Patient Position: Sitting, Cuff Size: Standard)   Pulse 99   Temp 97.9 °F (36.6 °C) (Skin)   Ht 5' 3" (1.6 m)   Wt 75.8 kg (167 lb)   SpO2 99%   BMI 29.58 kg/m²     Physical Exam  Vitals and nursing note reviewed. Constitutional:       Appearance: She is well-developed. HENT:      Head: Normocephalic and atraumatic. Nose: Nose normal.      Mouth/Throat:      Mouth: Mucous membranes are moist.   Eyes:      General: No scleral icterus.      Conjunctiva/sclera: Conjunctivae normal.      Pupils: Pupils are equal, round, and reactive to light. Neck:      Thyroid: No thyromegaly. Cardiovascular:      Rate and Rhythm: Normal rate and regular rhythm. Pulmonary:      Effort: Pulmonary effort is normal.      Breath sounds: Normal breath sounds. No wheezing. Abdominal:      General: Bowel sounds are normal. There is no distension. Palpations: Abdomen is soft. Tenderness: There is no abdominal tenderness. There is no guarding or rebound. Musculoskeletal:         General: Tenderness present. No deformity. Cervical back: Normal range of motion and neck supple. Comments: R shoulder pain on palpation posterior shoulder. Decreased ROM   Skin:     General: Skin is warm and dry. Findings: No erythema or rash. Neurological:      Mental Status: She is alert and oriented to person, place, and time. Sensory: No sensory deficit. Psychiatric:         Mood and Affect: Mood normal.         Behavior: Behavior normal.         Thought Content:  Thought content normal.         Judgment: Judgment normal.       Quoc Gonzales MD    Answers submitted by the patient for this visit:  AUDIT-C (Alcohol Use Disorders Identification Test) Screening (Submitted on 10/20/2023)  How often during the last year have you found that you were not able to stop drinking once you had started?: 0 - never  How often during the last year have you failed to do what was normally expected from you because of drinking?: 0 - never  How often during the last year have you needed a first drink in the morning to get yourself going after a heavy drinking session?: 0 - never  How often during the last year have you had a feeling of guilt or remorse after drinking?: 0 - never  How often during the last year have you been unable to remember what happened the night before because you had been drinking?: 0 - never  Have you or someone else been injured as a result of your drinking?: 0 - no  Has a relative or friend or a doctor or another health worker been concerned about your drinking or suggested you cut down?: 0 - no  Medicare Annual Wellness Visit (Submitted on 10/20/2023)  How would you rate your overall health?: good  Compared to last year, how is your physical health?: slightly worse  In general, how satisfied are you with your life?: satisfied  Compared to last year, how is your eyesight?: slightly worse  Compared to last year, how is your hearing?: same  Compared to last year, how is your emotional/mental health?: same  How often is anger a problem for you?: never, rarely  How often do you feel unusually tired/fatigued?: sometimes  In the past 7 days, how much pain have you experienced?: some  If you answered "some" or "a lot", please rate the severity of your pain on a scale of 1 to 10 (1 being the least severe pain and 10 being the most intense pain). : 3/10  In the past 6 months, have you lost or gained 10 pounds without trying?: No  One or more falls in the last year: No  In the past 6 months, have you accidentally leaked urine?: No  Do you have trouble with the stairs inside or outside your home?: No  Does your home have working smoke alarms?: No  Does your home have a carbon monoxide monitor?: No  Which safety hazards (if any) have you experienced in your home? Please select all that apply.: none  How would you describe your current diet?  Please select all that apply.: Regular  In addition to prescription medications, are you taking any over-the-counter supplements?: Yes  If yes, what supplements are you taking?: vitamins, sinus medications, melatonin  Can you manage your medications?: Yes  Are you currently taking any opioid medications?: No  Can you walk and transfer into and out of your bed and chair?: Yes  Can you dress and groom yourself?: Yes  Can you bathe or shower yourself?: Yes  Can you feed yourself?: Yes  Can you do your laundry/ housekeeping?: Yes  Can you manage your money, pay your bills, and track your expenses?: Yes  Can you make your own meals?: Yes  Can you do your own shopping?: Yes  Within the last 12 months, have you had any hospitalizations or Emergency Department visits?: No  Do you have a living will?: No  Do you have a Durable POA (Power of ) for healthcare decisions?: No  Do you have an Advanced Directive for end of life decisions?: No  How often have you used an illegal drug (including marijuana) or a prescription medication for non-medical reasons in the past year?: never  What is the typical number of drinks you consume in a day?: 0  What is the typical number of drinks you consume in a week?: 4  How often did you have a drink containing alcohol in the past year?: 2 to 3 times a week  How many drinks did you have on a typical day  when you were drinking in the past year?: 1 to 2  How often did you have 6 or more drinks on one occasion in the past year?: never

## 2023-10-20 NOTE — ASSESSMENT & PLAN NOTE
Pt is to avoid those substances that precipitate allergic reaction and to use OY+TC antihistamines and/or nasal steroid spray prn.

## 2023-10-20 NOTE — PROGRESS NOTES
Assessment and Plan:     Problem List Items Addressed This Visit     Chronic fatigue     Patient is stable  and will continue present plan of care and reassess at next routine visit. All questions about this problem from patient were answered today. Mixed hyperlipidemia     Patient  is stable with current medication and we discussed a low fat low cholesterol diet. Weight loss also discussed for this will help lower cholesterol also. Recheck lipids in 6 months. Relevant Orders    Comprehensive metabolic panel    Lipid Panel with Direct LDL reflex    Allergic rhinitis due to pollen     Pt is to avoid those substances that precipitate allergic reaction and to use OY+TC antihistamines and/or nasal steroid spray prn. Postviral fatigue syndrome   Other Visit Diagnoses     Well adult exam    -  Primary    Encounter for immunization        Relevant Orders    influenza vaccine, high-dose, PF 0.7 mL (FLUZONE HIGH-DOSE)    Screen for colon cancer        Relevant Orders    Cologuard    Screening mammogram for high-risk patient        Relevant Orders    Mammo screening bilateral w 3d & cad    Menopause        Relevant Orders    DXA bone density spine hip and pelvis    Chronic right shoulder pain        Relevant Orders    XR shoulder 2+ vw right    MRI shoulder right wo contrast          Depression Screening and Follow-up Plan: Patient was screened for depression during today's encounter. They screened negative with a PHQ-2 score of 0. Preventive health issues were discussed with patient, and age appropriate screening tests were ordered as noted in patient's After Visit Summary. Personalized health advice and appropriate referrals for health education or preventive services given if needed, as noted in patient's After Visit Summary.      History of Present Illness:     Patient presents for a Medicare Wellness Visit    HPI   Patient Care Team:  Alina Sandra MD as PCP - General (Family Medicine)     Review of Systems:     Review of Systems     Problem List:     Patient Active Problem List   Diagnosis   • Chronic fatigue   • Mixed hyperlipidemia   • Allergic rhinitis due to pollen   • Postviral fatigue syndrome   • Pain of toe of left foot   • Tenosynovitis of thumb      Past Medical and Surgical History:     Past Medical History:   Diagnosis Date   • Allergic 1970   • Allergic rhinitis due to pollen 09/18/2020   • Arthritis     hands   • Asthma    • Obesity      Past Surgical History:   Procedure Laterality Date   • JOINT REPLACEMENT     • KNEE SURGERY Bilateral     REPLACEMENT   • TONSILLECTOMY     • TUBAL LIGATION        Family History:     Family History   Problem Relation Age of Onset   • Other Mother         maintenance procedure for cardiac pacemaker system    • Breast cancer Maternal Grandmother    • Breast cancer Paternal Grandmother       Social History:     Social History     Socioeconomic History   • Marital status: /Civil Union     Spouse name: None   • Number of children: None   • Years of education: None   • Highest education level: None   Occupational History   • Occupation: RETIRED   Tobacco Use   • Smoking status: Former     Packs/day: 1.00     Years: 30.00     Total pack years: 30.00     Types: Cigarettes     Start date: 6/6/1978     Quit date: 9/18/2008     Years since quitting: 15.0   • Smokeless tobacco: Never   Vaping Use   • Vaping Use: Never used   Substance and Sexual Activity   • Alcohol use:  Yes     Alcohol/week: 2.0 standard drinks of alcohol     Types: 1 Cans of beer, 1 Standard drinks or equivalent per week   • Drug use: Never   • Sexual activity: Yes     Partners: Male     Birth control/protection: Surgical   Other Topics Concern   • None   Social History Narrative    · Do you currently or have you served in the [address] [place] [address] [employer]":   No      · Were you activated, into active duty, as a member of the [employer] or as a Reservist:   No      · Exercise level: None      · Diet:   Regular      · General stress level:   Low      · ·Caffeine intake: Moderate      · Chewing tobacco:   none      · Seat belts used routinely:   Yes      · Sunscreen used routinely:   No      · Smoke alarm in home: Yes      ·      Social Determinants of Health     Financial Resource Strain: Low Risk  (10/20/2023)    Overall Financial Resource Strain (CARDIA)    • Difficulty of Paying Living Expenses: Not very hard   Food Insecurity: Not on file   Transportation Needs: No Transportation Needs (10/20/2023)    PRAPARE - Transportation    • Lack of Transportation (Medical): No    • Lack of Transportation (Non-Medical): No   Physical Activity: Not on file   Stress: Not on file   Social Connections: Not on file   Intimate Partner Violence: Not on file   Housing Stability: Not on file      Medications and Allergies:     Current Outpatient Medications   Medication Sig Dispense Refill   • ezetimibe (ZETIA) 10 mg tablet TAKE 1 TABLET DAILY 90 tablet 3   • mometasone (NASONEX) 50 mcg/act nasal spray USE 1 SPRAY IN EACH NOSTRIL TWICE A DAY 51 g 3   • naproxen (NAPROSYN) 500 mg tablet TAKE 1 TABLET TWICE A DAY WITH MEALS 180 tablet 3   • phentermine (ADIPEX-P) 37.5 MG tablet TAKE ONE-HALF (1/2) TABLET DAILY 45 tablet 0   • Elastic Bandages & Supports (Splint Wrist Brace/Left-Right) MISC Use daily at bedtime 1 each 0     No current facility-administered medications for this visit.      No Known Allergies   Immunizations:     Immunization History   Administered Date(s) Administered   • COVID-19 PFIZER VACCINE 0.3 ML IM 01/06/2022, 01/27/2022   • INFLUENZA 12/10/2018   • Influenza, high dose seasonal 0.7 mL 10/17/2022   • Pneumococcal Conjugate 13-Valent 12/10/2018   • Pneumococcal Conjugate Vaccine 20-valent (Pcv20), Polysace 10/17/2022   • Tdap 07/24/2020      Health Maintenance:         Topic Date Due   • Breast Cancer Screening: Mammogram  Never done   • Colorectal Cancer Screening  Never done   • Hepatitis C Screening  Completed         Topic Date Due   • COVID-19 Vaccine (3 - Pfizer series) 03/24/2022   • Influenza Vaccine (1) 09/01/2023      Medicare Screening Tests and Risk Assessments:         Health Risk Assessment:   Patient rates overall health as good. Patient feels that their physical health rating is slightly worse. Patient is satisfied with their life. Eyesight was rated as slightly worse. Hearing was rated as same. Patient feels that their emotional and mental health rating is same. Patients states they are never, rarely angry. Patient states they are sometimes unusually tired/fatigued. Pain experienced in the last 7 days has been some. Patient's pain rating has been 3/10. Patient states that she has experienced no weight loss or gain in last 6 months. Depression Screening:   PHQ-2 Score: 0      Fall Risk Screening: In the past year, patient has experienced: no history of falling in past year      Urinary Incontinence Screening:   Patient has not leaked urine accidently in the last six months. Home Safety:  Patient does not have trouble with stairs inside or outside of their home. Patient has no working smoke alarms and has no working carbon monoxide detector. Home safety hazards include: none. Nutrition:   Current diet is Regular. Medications:   Patient is currently taking over-the-counter supplements. OTC medications include: see medication list. Patient is able to manage medications. Activities of Daily Living (ADLs)/Instrumental Activities of Daily Living (IADLs):   Walk and transfer into and out of bed and chair?: Yes  Dress and groom yourself?: Yes    Bathe or shower yourself?: Yes    Feed yourself?  Yes  Do your laundry/housekeeping?: Yes  Manage your money, pay your bills and track your expenses?: Yes  Make your own meals?: Yes    Do your own shopping?: Yes    Previous Hospitalizations:   Any hospitalizations or ED visits within the last 12 months?: No      Advance Care Planning:   Living will: No    Durable POA for healthcare: No    Advanced directive: No      PREVENTIVE SCREENINGS      Cardiovascular Screening:    General: Screening Not Indicated and History Lipid Disorder      Diabetes Screening:     General: Screening Current      Cervical Cancer Screening:    General: Screening Not Indicated      Lung Cancer Screening:     General: Screening Not Indicated      Hepatitis C Screening:    General: Screening Current    Screening, Brief Intervention, and Referral to Treatment (SBIRT)    Screening  Typical number of drinks in a day: 0  Typical number of drinks in a week: 4  Interpretation: Low risk drinking behavior. AUDIT-C Screenin) How often did you have a drink containing alcohol in the past year? 2 to 3 times a week  2) How many drinks did you have on a typical day when you were drinking in the past year? 1 to 2  3) How often did you have 6 or more drinks on one occasion in the past year? never    AUDIT-C Score: 3  Interpretation: Score 3-12 (female): POSITIVE screen for alcohol misuse    AUDIT Screenin) How often during the last year have you found that you were not able to stop drinking once you had started? 0 - never  5) How often during the last year have you failed to do what was normally expected from you because of drinking? 0 - never  6) How often during the last year have you needed a first drink in the morning to get yourself going after a heavy drinking session?  0 - never  7) How often during the last year have you had a feeling of guilt or remorse after drinking? 0 - never  8) How often during the last year have you been unable to remember what happened the night before because you had been drinking? 0 - never  9) Have you or someone else been injured as a result of your drinking? 0 - no  10) Has a relative or friend or a doctor or another health worker been concerned about your drinking or suggested you cut down? 0 - no    AUDIT Score: 3  Interpretation: Low risk alcohol consumption    Single Item Drug Screening:  How often have you used an illegal drug (including marijuana) or a prescription medication for non-medical reasons in the past year? never    Single Item Drug Screen Score: 0  Interpretation: Negative screen for possible drug use disorder    No results found.      Physical Exam:     /76 (BP Location: Left arm, Patient Position: Sitting, Cuff Size: Standard)   Pulse 99   Temp 97.9 °F (36.6 °C) (Skin)   Ht 5' 3" (1.6 m)   Wt 75.8 kg (167 lb)   SpO2 99%   BMI 29.58 kg/m²     Physical Exam     Beata Yadav MD

## 2023-10-23 ENCOUNTER — APPOINTMENT (OUTPATIENT)
Dept: RADIOLOGY | Facility: MEDICAL CENTER | Age: 68
End: 2023-10-23
Payer: MEDICARE

## 2023-10-23 DIAGNOSIS — G89.29 CHRONIC RIGHT SHOULDER PAIN: ICD-10-CM

## 2023-10-23 DIAGNOSIS — M25.511 CHRONIC RIGHT SHOULDER PAIN: ICD-10-CM

## 2023-10-23 PROCEDURE — 73030 X-RAY EXAM OF SHOULDER: CPT

## 2023-10-30 ENCOUNTER — HOSPITAL ENCOUNTER (OUTPATIENT)
Dept: MRI IMAGING | Facility: HOSPITAL | Age: 68
Discharge: HOME | End: 2023-10-30
Payer: MEDICARE

## 2023-10-30 DIAGNOSIS — G89.29 CHRONIC RIGHT SHOULDER PAIN: ICD-10-CM

## 2023-10-30 DIAGNOSIS — M25.511 CHRONIC RIGHT SHOULDER PAIN: ICD-10-CM

## 2023-10-30 PROCEDURE — G1004 CDSM NDSC: HCPCS

## 2023-10-30 PROCEDURE — 73221 MRI JOINT UPR EXTREM W/O DYE: CPT

## 2023-11-06 PROBLEM — M24.111 DEGENERATIVE TEAR OF GLENOID LABRUM OF RIGHT SHOULDER: Status: ACTIVE | Noted: 2023-11-06

## 2023-11-27 DIAGNOSIS — G93.31 POSTVIRAL FATIGUE SYNDROME: ICD-10-CM

## 2023-11-28 RX ORDER — PHENTERMINE HYDROCHLORIDE 37.5 MG/1
TABLET ORAL
Qty: 45 TABLET | Refills: 0 | Status: SHIPPED | OUTPATIENT
Start: 2023-11-28

## 2024-01-29 ENCOUNTER — OFFICE VISIT (OUTPATIENT)
Dept: OBGYN CLINIC | Facility: MEDICAL CENTER | Age: 69
End: 2024-01-29
Payer: MEDICARE

## 2024-01-29 ENCOUNTER — APPOINTMENT (OUTPATIENT)
Dept: RADIOLOGY | Facility: MEDICAL CENTER | Age: 69
End: 2024-01-29
Payer: MEDICARE

## 2024-01-29 VITALS
WEIGHT: 167 LBS | DIASTOLIC BLOOD PRESSURE: 92 MMHG | SYSTOLIC BLOOD PRESSURE: 172 MMHG | BODY MASS INDEX: 29.59 KG/M2 | HEART RATE: 97 BPM | HEIGHT: 63 IN

## 2024-01-29 DIAGNOSIS — M24.19 OTHER ARTICULAR CARTILAGE DISORDERS, OTHER SPECIFIED SITE: ICD-10-CM

## 2024-01-29 DIAGNOSIS — M25.511 RIGHT SHOULDER PAIN, UNSPECIFIED CHRONICITY: ICD-10-CM

## 2024-01-29 DIAGNOSIS — Z01.818 PRE-OP EVALUATION: ICD-10-CM

## 2024-01-29 DIAGNOSIS — M25.511 RIGHT SHOULDER PAIN, UNSPECIFIED CHRONICITY: Primary | ICD-10-CM

## 2024-01-29 DIAGNOSIS — M19.011 GLENOHUMERAL ARTHRITIS, RIGHT: ICD-10-CM

## 2024-01-29 PROCEDURE — 73030 X-RAY EXAM OF SHOULDER: CPT

## 2024-01-29 PROCEDURE — 99204 OFFICE O/P NEW MOD 45 MIN: CPT | Performed by: ORTHOPAEDIC SURGERY

## 2024-01-29 RX ORDER — CHLORHEXIDINE GLUCONATE 4 G/100ML
SOLUTION TOPICAL DAILY PRN
OUTPATIENT
Start: 2024-01-29

## 2024-01-29 RX ORDER — TRANEXAMIC ACID 10 MG/ML
1000 INJECTION, SOLUTION INTRAVENOUS ONCE
OUTPATIENT
Start: 2024-01-29 | End: 2024-01-29

## 2024-01-29 RX ORDER — CEFAZOLIN SODIUM 1 G/50ML
1000 SOLUTION INTRAVENOUS ONCE
OUTPATIENT
Start: 2024-01-29 | End: 2024-01-29

## 2024-01-29 RX ORDER — CHLORHEXIDINE GLUCONATE ORAL RINSE 1.2 MG/ML
15 SOLUTION DENTAL ONCE
OUTPATIENT
Start: 2024-01-29 | End: 2024-01-29

## 2024-01-29 NOTE — PROGRESS NOTES
[facility] [place]  [address]  [facility] [address]       Magali Stewart  [id number]  1955    ORTHOPAEDIC SURGERY OUTPATIENT NOTE  1/29/2024      HISTORY:  68 y.o. female presents for initial evaluation of her right shoulder.  She is right-hand dominant.  She complains of several years of right shoulder pain progressing with limitation.  She has pain at night.  Pain 6/10.  SANE score 50%.  She has taken anti-inflammatories for this.  No injections.  She denies fall or trauma to the shoulder.  She has trouble reaching overhead and moving her arm.  She complains of crepitus in the joint.  She is not diabetic.  No blood thinners.    Past Medical History:   Diagnosis Date    Allergic 1970    Allergic rhinitis due to pollen 09/18/2020    Arthritis     hands    Asthma     Obesity        Past Surgical History:   Procedure Laterality Date    JOINT REPLACEMENT      KNEE SURGERY Bilateral     REPLACEMENT    TONSILLECTOMY      TUBAL LIGATION         Social History     Socioeconomic History    Marital status: /Civil Union     Spouse name: Not on file    Number of children: Not on file    Years of education: Not on file    Highest education level: Not on file   Occupational History    Occupation: RETIRED   Tobacco Use    Smoking status: Former     Current packs/day: 0.00     Average packs/day: 1 pack/day for 30.3 years (30.3 ttl pk-yrs)     Types: Cigarettes     Start date: 6/6/1978     Quit date: 9/18/2008     Years since quitting: 15.3    Smokeless tobacco: Never   Vaping Use    Vaping status: Never Used   Substance and Sexual Activity    Alcohol use: Yes     Alcohol/week: 2.0 standard drinks of alcohol     Types: 1 Cans of beer, 1 Standard drinks or equivalent per week    Drug use: Never    Sexual activity: Yes     Partners: Male     Birth control/protection: Surgical   Other Topics Concern    Not on file   Social History Narrative    · Do you  "currently or have you served in the [employer] [place]:   No      · Were you activated, into active duty, as a member of the [employer] or as a Reservist:   No      · Exercise level:   None      · Diet:   Regular      · General stress level:   Low      · ·Caffeine intake:   Moderate      · Chewing tobacco:   none      · Seat belts used routinely:   Yes      · Sunscreen used routinely:   No      · Smoke alarm in home:   Yes      ·      Social Determinants of Health     Financial Resource Strain: Low Risk  (10/20/2023)    Overall Financial Resource Strain (CARDIA)     Difficulty of Paying Living Expenses: Not very hard   Food Insecurity: Not on file   Transportation Needs: No Transportation Needs (10/20/2023)    PRAPARE - Transportation     Lack of Transportation (Medical): No     Lack of Transportation (Non-Medical): No   Physical Activity: Not on file   Stress: Not on file   Social Connections: Not on file   Intimate Partner Violence: Not on file   Housing Stability: Not on file       Family History   Problem Relation Age of Onset    Other Mother         maintenance procedure for cardiac pacemaker system     Breast cancer Maternal Grandmother     Breast cancer Paternal Grandmother         Patient's Medications   New Prescriptions    No medications on file   Previous Medications    ELASTIC BANDAGES & SUPPORTS (SPLINT WRIST BRACE/LEFT-RIGHT) MISC    Use daily at bedtime    EZETIMIBE (ZETIA) 10 MG TABLET    TAKE 1 TABLET DAILY    MOMETASONE (NASONEX) 50 MCG/ACT NASAL SPRAY    USE 1 SPRAY IN EACH NOSTRIL TWICE A DAY    NAPROXEN (NAPROSYN) 500 MG TABLET    TAKE 1 TABLET TWICE A DAY WITH MEALS    PHENTERMINE (ADIPEX-P) 37.5 MG TABLET    TAKE ONE-HALF (1/2) TABLET DAILY   Modified Medications    No medications on file   Discontinued Medications    No medications on file       No Known Allergies     BP (!) 172/92 (BP Location: Left arm, Patient Position: Sitting, Cuff Size: Standard)   Pulse 97   Ht 5' 3\" (1.6 m)   Wt " "75.8 kg (167 lb)   BMI 29.58 kg/m²      REVIEW OF SYSTEMS:  Constitutional: Negative.    HEENT: Negative.    Respiratory: Negative.    Skin: Negative.    Neurological: Negative.    Psychiatric/Behavioral: Negative.  Musculoskeletal: Negative except for that mentioned in the HPI.    BP (!) 172/92 (BP Location: Left arm, Patient Position: Sitting, Cuff Size: Standard)   Pulse 97   Ht 5' 3\" (1.6 m)   Wt 75.8 kg (167 lb)   BMI 29.58 kg/m²   Gen: No acute distress, resting comfortably in bed  HEENT: Eyes clear, moist mucus membranes, hearing intact  Respiratory: No audible wheezing or stridor  Cardiovascular: Well Perfused peripherally, 2+ distal pulse  Abdomen: nondistended, no peritoneal signs     PHYSICAL EXAM:  RIGHT SHOULDER:    Appearance: normal    Forward flexion:   140 degrees active, 180 passive  Abduction:  80 degrees   External rotation at 0 degrees:   30 degrees, passive 40  Internal rotation: right buttock     STRENGTH:  Forward flexion:  5/5   External rotation:  5/5   Internal rotation:  5/5       Radial/median/ulnar nerve intact    <2 sec cap refill       IMAGING:  3 views of the right shoulder were taken, reviewed and interpreted independently that demonstrate severe glenohumeral arthritis with loss of joint space, cystic changes and osteophyte formation. Reviewed by me personally.      ASSESSMENT AND PLAN:  68 y.o. female with right shoulder severe glenohumeral arthritis.  Discussed with the patient treatment options in the form of conservative management with injection versus surgical intervention with total shoulder arthroplasty.  We did discuss that injections would likely give short-lived relief given her significant arthritis.  She would like to proceed with surgical invention in the form of total shoulder arthroplasty.  We did discuss anatomic versus reverse total shoulder which would be an intraoperative decision and based upon her preoperative CT blueprint.  Postoperative course was " discussed.    The patient understands the risks and benefits of the procedure with risks including pain, stiffness, infection, neurovascular injury, recurrence of symptoms, failure of surgical procedure, inadvertent intraoperative complications, blood loss, blood clots, allergic reaction to anesthesia, stroke, heart attack, all up to and including to death. The patient understood and did consent for surgery today.     Scribe Attestation      I,:  Rivas Conner PA-C am acting as a scribe while in the presence of the attending physician.:       I,:  Neeru Lovell personally performed the services described in this documentation    as scribed in my presence.:

## 2024-02-10 ENCOUNTER — HOSPITAL ENCOUNTER (OUTPATIENT)
Dept: CT IMAGING | Facility: HOSPITAL | Age: 69
Discharge: HOME | End: 2024-02-10
Payer: MEDICARE

## 2024-02-10 ENCOUNTER — APPOINTMENT (OUTPATIENT)
Dept: LAB | Facility: CLINIC | Age: 69
End: 2024-02-10
Payer: MEDICARE

## 2024-02-10 DIAGNOSIS — M19.011 GLENOHUMERAL ARTHRITIS, RIGHT: ICD-10-CM

## 2024-02-10 DIAGNOSIS — Z01.818 PRE-OP EVALUATION: ICD-10-CM

## 2024-02-10 DIAGNOSIS — M25.511 RIGHT SHOULDER PAIN, UNSPECIFIED CHRONICITY: ICD-10-CM

## 2024-02-10 DIAGNOSIS — M24.19 OTHER ARTICULAR CARTILAGE DISORDERS, OTHER SPECIFIED SITE: ICD-10-CM

## 2024-02-10 LAB
ALBUMIN SERPL BCP-MCNC: 4.1 G/DL (ref 3.5–5)
ALP SERPL-CCNC: 103 U/L (ref 34–104)
ALT SERPL W P-5'-P-CCNC: 17 U/L (ref 7–52)
ANION GAP SERPL CALCULATED.3IONS-SCNC: 5 MMOL/L
APTT PPP: 27 SECONDS (ref 23–37)
AST SERPL W P-5'-P-CCNC: 15 U/L (ref 13–39)
BASOPHILS # BLD AUTO: 0.06 THOUSANDS/ÂΜL (ref 0–0.1)
BASOPHILS NFR BLD AUTO: 1 % (ref 0–1)
BILIRUB SERPL-MCNC: 0.42 MG/DL (ref 0.2–1)
BUN SERPL-MCNC: 18 MG/DL (ref 5–25)
CALCIUM SERPL-MCNC: 9.2 MG/DL (ref 8.4–10.2)
CHLORIDE SERPL-SCNC: 104 MMOL/L (ref 96–108)
CO2 SERPL-SCNC: 30 MMOL/L (ref 21–32)
CREAT SERPL-MCNC: 0.68 MG/DL (ref 0.6–1.3)
EOSINOPHIL # BLD AUTO: 0.26 THOUSAND/ÂΜL (ref 0–0.61)
EOSINOPHIL NFR BLD AUTO: 5 % (ref 0–6)
ERYTHROCYTE [DISTWIDTH] IN BLOOD BY AUTOMATED COUNT: 13.8 % (ref 11.6–15.1)
GFR SERPL CREATININE-BSD FRML MDRD: 90 ML/MIN/1.73SQ M
GLUCOSE P FAST SERPL-MCNC: 82 MG/DL (ref 65–99)
HCT VFR BLD AUTO: 42.4 % (ref 34.8–46.1)
HGB BLD-MCNC: 13.8 G/DL (ref 11.5–15.4)
IMM GRANULOCYTES # BLD AUTO: 0.01 THOUSAND/UL (ref 0–0.2)
IMM GRANULOCYTES NFR BLD AUTO: 0 % (ref 0–2)
INR PPP: 0.93 (ref 0.84–1.19)
LYMPHOCYTES # BLD AUTO: 0.98 THOUSANDS/ÂΜL (ref 0.6–4.47)
LYMPHOCYTES NFR BLD AUTO: 19 % (ref 14–44)
MCH RBC QN AUTO: 31.7 PG (ref 26.8–34.3)
MCHC RBC AUTO-ENTMCNC: 32.5 G/DL (ref 31.4–37.4)
MCV RBC AUTO: 97 FL (ref 82–98)
MONOCYTES # BLD AUTO: 0.53 THOUSAND/ÂΜL (ref 0.17–1.22)
MONOCYTES NFR BLD AUTO: 10 % (ref 4–12)
NEUTROPHILS # BLD AUTO: 3.3 THOUSANDS/ÂΜL (ref 1.85–7.62)
NEUTS SEG NFR BLD AUTO: 65 % (ref 43–75)
NRBC BLD AUTO-RTO: 0 /100 WBCS
PLATELET # BLD AUTO: 293 THOUSANDS/UL (ref 149–390)
PMV BLD AUTO: 10 FL (ref 8.9–12.7)
POTASSIUM SERPL-SCNC: 4.5 MMOL/L (ref 3.5–5.3)
PROT SERPL-MCNC: 6.9 G/DL (ref 6.4–8.4)
PROTHROMBIN TIME: 13.1 SECONDS (ref 11.6–14.5)
RBC # BLD AUTO: 4.36 MILLION/UL (ref 3.81–5.12)
SODIUM SERPL-SCNC: 139 MMOL/L (ref 135–147)
WBC # BLD AUTO: 5.14 THOUSAND/UL (ref 4.31–10.16)

## 2024-02-10 PROCEDURE — 86901 BLOOD TYPING SEROLOGIC RH(D): CPT | Performed by: PHYSICIAN ASSISTANT

## 2024-02-10 PROCEDURE — 36415 COLL VENOUS BLD VENIPUNCTURE: CPT

## 2024-02-10 PROCEDURE — 85730 THROMBOPLASTIN TIME PARTIAL: CPT

## 2024-02-10 PROCEDURE — 80053 COMPREHEN METABOLIC PANEL: CPT

## 2024-02-10 PROCEDURE — 85025 COMPLETE CBC W/AUTO DIFF WBC: CPT

## 2024-02-10 PROCEDURE — 85610 PROTHROMBIN TIME: CPT

## 2024-02-10 PROCEDURE — 83036 HEMOGLOBIN GLYCOSYLATED A1C: CPT

## 2024-02-10 PROCEDURE — 86850 RBC ANTIBODY SCREEN: CPT | Performed by: PHYSICIAN ASSISTANT

## 2024-02-10 PROCEDURE — G1004 CDSM NDSC: HCPCS

## 2024-02-10 PROCEDURE — 86900 BLOOD TYPING SEROLOGIC ABO: CPT | Performed by: PHYSICIAN ASSISTANT

## 2024-02-10 PROCEDURE — 73200 CT UPPER EXTREMITY W/O DYE: CPT

## 2024-02-11 ENCOUNTER — LAB REQUISITION (OUTPATIENT)
Dept: LAB | Facility: HOSPITAL | Age: 69
End: 2024-02-11
Payer: MEDICARE

## 2024-02-11 DIAGNOSIS — Z01.818 ENCOUNTER FOR OTHER PREPROCEDURAL EXAMINATION: ICD-10-CM

## 2024-02-11 LAB
ABO GROUP BLD: NORMAL
ATRIAL RATE: 88 BPM
BLD GP AB SCN SERPL QL: NEGATIVE
EST. AVERAGE GLUCOSE BLD GHB EST-MCNC: 123 MG/DL
HBA1C MFR BLD: 5.9 %
P AXIS: 78 DEGREES
PR INTERVAL: 134 MS
QRS AXIS: 72 DEGREES
QRSD INTERVAL: 116 MS
QT INTERVAL: 406 MS
QTC INTERVAL: 491 MS
RH BLD: POSITIVE
SPECIMEN EXPIRATION DATE: NORMAL
T WAVE AXIS: 30 DEGREES
VENTRICULAR RATE: 88 BPM

## 2024-02-12 ENCOUNTER — CONSULT (OUTPATIENT)
Dept: FAMILY MEDICINE CLINIC | Facility: CLINIC | Age: 69
End: 2024-02-12
Payer: MEDICARE

## 2024-02-12 VITALS
DIASTOLIC BLOOD PRESSURE: 74 MMHG | OXYGEN SATURATION: 98 % | HEIGHT: 63 IN | TEMPERATURE: 97.5 F | WEIGHT: 165 LBS | SYSTOLIC BLOOD PRESSURE: 142 MMHG | RESPIRATION RATE: 16 BRPM | HEART RATE: 93 BPM | BODY MASS INDEX: 29.23 KG/M2

## 2024-02-12 DIAGNOSIS — Z01.818 PREOP EXAMINATION: Primary | ICD-10-CM

## 2024-02-12 PROCEDURE — 99213 OFFICE O/P EST LOW 20 MIN: CPT | Performed by: NURSE PRACTITIONER

## 2024-02-12 NOTE — PROGRESS NOTES
Assessment/Plan:      Diagnoses and all orders for this visit:    Preop examination        Assessment is unremarkable.  Recent lab work and EKG were reviewed.  Patient is an excellent surgical candidate for the upcoming right shoulder arthroplasty formed February 20.  Subjective:     Patient ID: Magali Stewart is a 68 y.o. female.    Patient is here for preop clearance for right shoulder arthroplasty to be performed February 20.  EKG has been received is within normal limits.  Also lab work has been completed also within normal limits.        Review of Systems   Constitutional:  Negative for appetite change and fever.   HENT:  Negative for sinus pressure and sore throat.    Eyes:  Negative for pain.   Respiratory:  Negative for shortness of breath.    Cardiovascular:  Negative for chest pain.   Gastrointestinal:  Negative for abdominal pain.   Genitourinary:  Negative for dysuria.   Musculoskeletal:  Positive for arthralgias (r shoulder). Negative for myalgias.   Skin:  Negative for color change.   Neurological:  Negative for light-headedness.   Psychiatric/Behavioral:  Negative for behavioral problems.          Objective:     Physical Exam  Vitals and nursing note reviewed.   Constitutional:       General: She is not in acute distress.     Appearance: She is well-developed. She is not diaphoretic.   HENT:      Head: Normocephalic and atraumatic.      Right Ear: External ear normal.      Left Ear: External ear normal.      Mouth/Throat:      Pharynx: Oropharynx is clear.   Eyes:      Pupils: Pupils are equal, round, and reactive to light.   Cardiovascular:      Rate and Rhythm: Normal rate and regular rhythm.      Heart sounds: Normal heart sounds.   Pulmonary:      Effort: Pulmonary effort is normal.      Breath sounds: Normal breath sounds.   Abdominal:      Palpations: Abdomen is soft.   Musculoskeletal:         General: Tenderness (r shoulder) present. Normal range of motion.      Cervical back: Normal range of  motion and neck supple.   Skin:     General: Skin is dry.   Neurological:      Mental Status: She is alert and oriented to person, place, and time.   Psychiatric:         Behavior: Behavior normal.         Thought Content: Thought content normal.

## 2024-02-16 ENCOUNTER — ANESTHESIA EVENT (OUTPATIENT)
Dept: PERIOP | Facility: HOSPITAL | Age: 69
End: 2024-02-16
Payer: MEDICARE

## 2024-02-16 NOTE — PRE-PROCEDURE INSTRUCTIONS
Pre-Surgery Instructions:   Medication Instructions    ezetimibe (ZETIA) 10 mg tablet Take day of surgery.    mometasone (NASONEX) 50 mcg/act nasal spray Uses PRN- OK to take day of surgery    naproxen (NAPROSYN) 500 mg tablet Stop taking 7 days prior to surgery.    phentermine (ADIPEX-P) 37.5 MG tablet Stop taking 5 days prior to surgery. PATIENT TOOK 2/16/24      Since patient should have started holding phentermine today, 2/16/24, and she states she did take it already today, will send message to SOC and surgeon to comment.    Medication instructions for day surgery reviewed. Please use only a sip of water to take your instructed medications. Avoid all over the counter vitamins, supplements and NSAIDS for one week prior to surgery per anesthesia guidelines. Tylenol is ok to take as needed.     You will receive a call one business day prior to surgery with an arrival time and hospital directions. If your surgery is scheduled on a Monday, the hospital will be calling you on the Friday prior to your surgery. If you have not heard from anyone by 8pm, please call the hospital supervisor through the hospital  at [phone]. ([place] [phone] or [place] [phone]).    Do not eat or drink anything after midnight the night before your surgery, including candy, mints, lifesavers, or chewing gum. Do not drink alcohol 24hrs before your surgery. Try not to smoke at least 24hrs before your surgery.       Follow the pre surgery showering instructions as listed in the “My Surgical Experience Booklet” or otherwise provided by your surgeon's office. Do not use a blade to shave the surgical area 1 week before surgery. It is okay to use a clean electric clippers up to 24 hours before surgery. Do not apply any lotions, creams, including makeup, cologne, deodorant, or perfumes after showering on the day of your surgery. Do not use dry shampoo, hair spray, hair gel, or any type of hair products.     No contact  lenses, eye make-up, or artificial eyelashes. Remove nail polish, including gel polish, and any artificial, gel, or acrylic nails if possible. Remove all jewelry including rings and body piercing jewelry.     Wear causal clothing that is easy to take on and off. Consider your type of surgery.    Keep any valuables, jewelry, piercings at home. Please bring any specially ordered equipment (sling, braces) if indicated.    Arrange for a responsible person to drive you to and from the hospital on the day of your surgery. Visitor Guidelines discussed.     Call the surgeon's office with any new illnesses, exposures, or additional questions prior to surgery.    Please reference your “My Surgical Experience Booklet” for additional information to prepare for your upcoming surgery.

## 2024-02-19 ENCOUNTER — TELEPHONE (OUTPATIENT)
Age: 69
End: 2024-02-19

## 2024-02-19 NOTE — TELEPHONE ENCOUNTER
Caller: Patient    Doctor: Liliya    Reason for call: Patient is calling stating someone was supposed to reach out to her today about her surgery.    Call back#: [phone]

## 2024-02-20 ENCOUNTER — ANESTHESIA (OUTPATIENT)
Dept: PERIOP | Facility: HOSPITAL | Age: 69
End: 2024-02-20
Payer: MEDICARE

## 2024-02-20 NOTE — TELEPHONE ENCOUNTER
Called pt and advised we moved her sx to 3/1 as that is the soonest day available. I also advised to stop phentermine 5 days prior.

## 2024-02-23 DIAGNOSIS — G93.31 POSTVIRAL FATIGUE SYNDROME: ICD-10-CM

## 2024-02-23 RX ORDER — PHENTERMINE HYDROCHLORIDE 37.5 MG/1
TABLET ORAL
Qty: 45 TABLET | Refills: 0 | Status: SHIPPED | OUTPATIENT
Start: 2024-02-23

## 2024-02-23 NOTE — TELEPHONE ENCOUNTER
Caller: patient     Doctor: Miky     Reason for call: patient said hedy on 2/28 works for her,  asking for Ophelia to please reach out to her.     Call back#: [phone]    Call xfer'd to Ophelia

## 2024-02-26 NOTE — ANESTHESIA PREPROCEDURE EVALUATION
Procedure:  ARTHROPLASTY SHOULDER- right anatomic vs reverse, and all necessary procedures (Right: Shoulder)    ECG: NSR    A1c 5.9  TAS O+    Phentermine - 6 days    Relevant Problems   CARDIO   (+) Mixed hyperlipidemia      MUSCULOSKELETAL   (+) Glenohumeral arthritis, right        Physical Exam    Airway    Mallampati score: II  TM Distance: >3 FB  Neck ROM: full     Dental       Cardiovascular      Pulmonary      Other Findings  post-pubertal.      Anesthesia Plan  ASA Score- 2     Anesthesia Type- general with ASA Monitors.         Additional Monitors:     Airway Plan: ETT.    Comment: Interscalene block .       Plan Factors-Exercise tolerance (METS): >4 METS.    Chart reviewed. EKG reviewed.  Existing labs reviewed. Patient summary reviewed.    Patient is not a current smoker.      Obstructive sleep apnea risk education given perioperatively.        Induction- intravenous.    Postoperative Plan-     Informed Consent- Anesthetic plan and risks discussed with patient.  I personally reviewed this patient with the CRNA. Discussed and agreed on the Anesthesia Plan with the CRNA..

## 2024-02-27 ENCOUNTER — APPOINTMENT (OUTPATIENT)
Dept: PREADMISSION TESTING | Facility: HOSPITAL | Age: 69
End: 2024-02-27
Payer: MEDICARE

## 2024-02-28 ENCOUNTER — HOSPITAL ENCOUNTER (OUTPATIENT)
Facility: HOSPITAL | Age: 69
Setting detail: OUTPATIENT SURGERY
Discharge: HOME | End: 2024-02-28
Attending: ORTHOPAEDIC SURGERY | Admitting: ORTHOPAEDIC SURGERY
Payer: MEDICARE

## 2024-02-28 ENCOUNTER — APPOINTMENT (OUTPATIENT)
Dept: RADIOLOGY | Facility: HOSPITAL | Age: 69
End: 2024-02-28
Payer: MEDICARE

## 2024-02-28 VITALS
HEIGHT: 63 IN | DIASTOLIC BLOOD PRESSURE: 57 MMHG | WEIGHT: 162.3 LBS | RESPIRATION RATE: 20 BRPM | BODY MASS INDEX: 28.76 KG/M2 | HEART RATE: 87 BPM | OXYGEN SATURATION: 91 % | SYSTOLIC BLOOD PRESSURE: 125 MMHG | TEMPERATURE: 97 F

## 2024-02-28 DIAGNOSIS — M19.011 GLENOHUMERAL ARTHRITIS, RIGHT: Primary | ICD-10-CM

## 2024-02-28 PROCEDURE — C9290 INJ, BUPIVACAINE LIPOSOME: HCPCS | Performed by: ANESTHESIOLOGY

## 2024-02-28 PROCEDURE — C1713 ANCHOR/SCREW BN/BN,TIS/BN: HCPCS | Performed by: ORTHOPAEDIC SURGERY

## 2024-02-28 PROCEDURE — 73020 X-RAY EXAM OF SHOULDER: CPT

## 2024-02-28 PROCEDURE — 23472 RECONSTRUCT SHOULDER JOINT: CPT | Performed by: ORTHOPAEDIC SURGERY

## 2024-02-28 PROCEDURE — C1776 JOINT DEVICE (IMPLANTABLE): HCPCS | Performed by: ORTHOPAEDIC SURGERY

## 2024-02-28 DEVICE — IMPLANTABLE DEVICE
Type: IMPLANTABLE DEVICE | Site: SHOULDER | Status: FUNCTIONAL
Brand: AEQUALIS™ PERFORM

## 2024-02-28 DEVICE — HEAD HUM SZ 1 SIMPLICITI NUCLEUS: Type: IMPLANTABLE DEVICE | Site: SHOULDER | Status: FUNCTIONAL

## 2024-02-28 DEVICE — SMARTSET HIGH PERFORMANCE MV MEDIUM VISCOSITY BONE CEMENT 40G
Type: IMPLANTABLE DEVICE | Site: SHOULDER | Status: FUNCTIONAL
Brand: SMARTSET

## 2024-02-28 DEVICE — 4.75MM BC KNOTLESS SWIVELOCK
Type: IMPLANTABLE DEVICE | Site: SHOULDER | Status: FUNCTIONAL
Brand: ARTHREX®

## 2024-02-28 DEVICE — IMPLANTABLE DEVICE: Type: IMPLANTABLE DEVICE | Site: SHOULDER | Status: FUNCTIONAL

## 2024-02-28 RX ORDER — CHLORHEXIDINE GLUCONATE 4 G/100ML
SOLUTION TOPICAL DAILY PRN
Status: DISCONTINUED | OUTPATIENT
Start: 2024-02-28 | End: 2024-02-28 | Stop reason: HOSPADM

## 2024-02-28 RX ORDER — LIDOCAINE HYDROCHLORIDE 10 MG/ML
INJECTION, SOLUTION EPIDURAL; INFILTRATION; INTRACAUDAL; PERINEURAL AS NEEDED
Status: DISCONTINUED | OUTPATIENT
Start: 2024-02-28 | End: 2024-02-28

## 2024-02-28 RX ORDER — MIDAZOLAM HYDROCHLORIDE 2 MG/2ML
INJECTION, SOLUTION INTRAMUSCULAR; INTRAVENOUS AS NEEDED
Status: DISCONTINUED | OUTPATIENT
Start: 2024-02-28 | End: 2024-02-28

## 2024-02-28 RX ORDER — TRANEXAMIC ACID 10 MG/ML
1000 INJECTION, SOLUTION INTRAVENOUS ONCE
Status: COMPLETED | OUTPATIENT
Start: 2024-02-28 | End: 2024-02-28

## 2024-02-28 RX ORDER — SODIUM CHLORIDE, SODIUM LACTATE, POTASSIUM CHLORIDE, CALCIUM CHLORIDE 600; 310; 30; 20 MG/100ML; MG/100ML; MG/100ML; MG/100ML
INJECTION, SOLUTION INTRAVENOUS CONTINUOUS PRN
Status: DISCONTINUED | OUTPATIENT
Start: 2024-02-28 | End: 2024-02-28

## 2024-02-28 RX ORDER — PROPOFOL 10 MG/ML
INJECTION, EMULSION INTRAVENOUS AS NEEDED
Status: DISCONTINUED | OUTPATIENT
Start: 2024-02-28 | End: 2024-02-28

## 2024-02-28 RX ORDER — ROPIVACAINE HYDROCHLORIDE 2 MG/ML
INJECTION, SOLUTION EPIDURAL; INFILTRATION; PERINEURAL
Status: COMPLETED | OUTPATIENT
Start: 2024-02-28 | End: 2024-02-28

## 2024-02-28 RX ORDER — CEFAZOLIN SODIUM 1 G/50ML
1000 SOLUTION INTRAVENOUS ONCE
Status: COMPLETED | OUTPATIENT
Start: 2024-02-28 | End: 2024-02-28

## 2024-02-28 RX ORDER — FENTANYL CITRATE 50 UG/ML
INJECTION, SOLUTION INTRAMUSCULAR; INTRAVENOUS AS NEEDED
Status: DISCONTINUED | OUTPATIENT
Start: 2024-02-28 | End: 2024-02-28

## 2024-02-28 RX ORDER — ONDANSETRON 2 MG/ML
INJECTION INTRAMUSCULAR; INTRAVENOUS AS NEEDED
Status: DISCONTINUED | OUTPATIENT
Start: 2024-02-28 | End: 2024-02-28

## 2024-02-28 RX ORDER — KETOROLAC TROMETHAMINE 30 MG/ML
INJECTION, SOLUTION INTRAMUSCULAR; INTRAVENOUS AS NEEDED
Status: DISCONTINUED | OUTPATIENT
Start: 2024-02-28 | End: 2024-02-28

## 2024-02-28 RX ORDER — FENTANYL CITRATE/PF 50 MCG/ML
25 SYRINGE (ML) INJECTION
Status: DISCONTINUED | OUTPATIENT
Start: 2024-02-28 | End: 2024-02-28 | Stop reason: HOSPADM

## 2024-02-28 RX ORDER — ALBUTEROL SULFATE 2.5 MG/3ML
2.5 SOLUTION RESPIRATORY (INHALATION) ONCE AS NEEDED
Status: DISCONTINUED | OUTPATIENT
Start: 2024-02-28 | End: 2024-02-28 | Stop reason: HOSPADM

## 2024-02-28 RX ORDER — CHLORHEXIDINE GLUCONATE ORAL RINSE 1.2 MG/ML
15 SOLUTION DENTAL ONCE
Status: COMPLETED | OUTPATIENT
Start: 2024-02-28 | End: 2024-02-28

## 2024-02-28 RX ORDER — MAGNESIUM HYDROXIDE 1200 MG/15ML
LIQUID ORAL AS NEEDED
Status: DISCONTINUED | OUTPATIENT
Start: 2024-02-28 | End: 2024-02-28 | Stop reason: HOSPADM

## 2024-02-28 RX ORDER — OXYCODONE HYDROCHLORIDE 5 MG/1
5 TABLET ORAL EVERY 4 HOURS PRN
Qty: 30 TABLET | Refills: 0 | Status: SHIPPED | OUTPATIENT
Start: 2024-02-28 | End: 2024-03-04

## 2024-02-28 RX ORDER — DEXAMETHASONE SODIUM PHOSPHATE 10 MG/ML
INJECTION, SOLUTION INTRAMUSCULAR; INTRAVENOUS AS NEEDED
Status: DISCONTINUED | OUTPATIENT
Start: 2024-02-28 | End: 2024-02-28

## 2024-02-28 RX ORDER — PENICILLIN V POTASSIUM 500 MG/1
500 TABLET ORAL EVERY 6 HOURS SCHEDULED
Qty: 8 TABLET | Refills: 0 | Status: SHIPPED | OUTPATIENT
Start: 2024-02-28 | End: 2024-03-01

## 2024-02-28 RX ORDER — HYDROMORPHONE HCL/PF 1 MG/ML
0.5 SYRINGE (ML) INJECTION
Status: DISCONTINUED | OUTPATIENT
Start: 2024-02-28 | End: 2024-02-28 | Stop reason: HOSPADM

## 2024-02-28 RX ORDER — VANCOMYCIN HYDROCHLORIDE 1 G/20ML
INJECTION, POWDER, LYOPHILIZED, FOR SOLUTION INTRAVENOUS AS NEEDED
Status: DISCONTINUED | OUTPATIENT
Start: 2024-02-28 | End: 2024-02-28 | Stop reason: HOSPADM

## 2024-02-28 RX ORDER — ONDANSETRON 2 MG/ML
4 INJECTION INTRAMUSCULAR; INTRAVENOUS ONCE AS NEEDED
Status: DISCONTINUED | OUTPATIENT
Start: 2024-02-28 | End: 2024-02-28 | Stop reason: HOSPADM

## 2024-02-28 RX ORDER — ROCURONIUM BROMIDE 10 MG/ML
INJECTION, SOLUTION INTRAVENOUS AS NEEDED
Status: DISCONTINUED | OUTPATIENT
Start: 2024-02-28 | End: 2024-02-28

## 2024-02-28 RX ADMIN — ROCURONIUM BROMIDE 50 MG: 10 INJECTION, SOLUTION INTRAVENOUS at 12:07

## 2024-02-28 RX ADMIN — PROPOFOL 30 MG: 10 INJECTION, EMULSION INTRAVENOUS at 12:10

## 2024-02-28 RX ADMIN — CHLORHEXIDINE GLUCONATE 0.12% ORAL RINSE 15 ML: 1.2 LIQUID ORAL at 10:47

## 2024-02-28 RX ADMIN — TRANEXAMIC ACID 1000 MG: 10 INJECTION, SOLUTION INTRAVENOUS at 12:25

## 2024-02-28 RX ADMIN — FENTANYL CITRATE 50 MCG: 50 INJECTION INTRAMUSCULAR; INTRAVENOUS at 12:07

## 2024-02-28 RX ADMIN — PROPOFOL 30 MG: 10 INJECTION, EMULSION INTRAVENOUS at 12:13

## 2024-02-28 RX ADMIN — ROCURONIUM BROMIDE 10 MG: 10 INJECTION, SOLUTION INTRAVENOUS at 13:19

## 2024-02-28 RX ADMIN — MIDAZOLAM HYDROCHLORIDE 2 MG: 1 INJECTION, SOLUTION INTRAMUSCULAR; INTRAVENOUS at 11:45

## 2024-02-28 RX ADMIN — FENTANYL CITRATE 50 MCG: 50 INJECTION INTRAMUSCULAR; INTRAVENOUS at 14:08

## 2024-02-28 RX ADMIN — FENTANYL CITRATE 50 MCG: 50 INJECTION INTRAMUSCULAR; INTRAVENOUS at 12:47

## 2024-02-28 RX ADMIN — ROPIVACAINE HYDROCHLORIDE 10 ML: 2 INJECTION, SOLUTION EPIDURAL; INFILTRATION at 11:53

## 2024-02-28 RX ADMIN — ONDANSETRON 4 MG: 2 INJECTION INTRAMUSCULAR; INTRAVENOUS at 14:08

## 2024-02-28 RX ADMIN — CEFAZOLIN SODIUM 1000 MG: 1 SOLUTION INTRAVENOUS at 12:10

## 2024-02-28 RX ADMIN — DEXAMETHASONE SODIUM PHOSPHATE 10 MG: 10 INJECTION INTRAMUSCULAR; INTRAVENOUS at 12:10

## 2024-02-28 RX ADMIN — ROCURONIUM BROMIDE 10 MG: 10 INJECTION, SOLUTION INTRAVENOUS at 13:09

## 2024-02-28 RX ADMIN — PROPOFOL 120 MG: 10 INJECTION, EMULSION INTRAVENOUS at 12:07

## 2024-02-28 RX ADMIN — TRANEXAMIC ACID 1000 MG: 10 INJECTION, SOLUTION INTRAVENOUS at 14:25

## 2024-02-28 RX ADMIN — FENTANYL CITRATE 50 MCG: 50 INJECTION INTRAMUSCULAR; INTRAVENOUS at 13:31

## 2024-02-28 RX ADMIN — LIDOCAINE HYDROCHLORIDE 50 MG: 10 INJECTION, SOLUTION EPIDURAL; INFILTRATION; INTRACAUDAL; PERINEURAL at 12:07

## 2024-02-28 RX ADMIN — SODIUM CHLORIDE, SODIUM LACTATE, POTASSIUM CHLORIDE, AND CALCIUM CHLORIDE: .6; .31; .03; .02 INJECTION, SOLUTION INTRAVENOUS at 11:58

## 2024-02-28 RX ADMIN — BUPIVACAINE 20 ML: 13.3 INJECTION, SUSPENSION, LIPOSOMAL INFILTRATION at 11:53

## 2024-02-28 NOTE — DISCHARGE INSTR - AVS FIRST PAGE
Neeru Lovell DO    Orthopedic Surgery, Shoulder/Elbow and Sports Medicine  [address] [place] [address], [place] [address]  Phone: [phone]    General Post-op Surgical Instructions:    Date of Procedure - 02/28/24     Procedure - Right total shoulder arthroplasty    Weight Bearing Status - nonweightbearing right arm in sling    DVT Prophylaxis and Duration - aspirin 81 mg daily for 28 days    PT/OT Instructions - to be discussed at first post-op    Stitches/Staples - Will be removed at 7-10 days postop; we will then place steristrips on incision site    Wound Care - maintain dressing, keep clean and dry    Xray follow up - to be done at or prior to office visit follow-up if indicated    Additional Info - Please complete your course of antibiotics     Any questions or concerns please call [phone] please!

## 2024-02-28 NOTE — ANESTHESIA PROCEDURE NOTES
Peripheral Block    Patient location during procedure: holding area  Start time: 2/28/2024 11:53 AM  Reason for block: at surgeon's request and post-op pain management  Staffing  Performed by: Mesfin Dias MD  Authorized by: Mesfin Dias MD    Preanesthetic Checklist  Completed: patient identified, IV checked, site marked, risks and benefits discussed, surgical consent, monitors and equipment checked, pre-op evaluation and timeout performed  Peripheral Block  Prep: ChloraPrep  Patient monitoring: frequent blood pressure checks, continuous pulse oximetry and heart rate  Block type: Interscalene  Laterality: right  Injection technique: single-shot  Procedures: ultrasound guided, Ultrasound guidance required for the procedure to increase accuracy and safety of medication placement and decrease risk of complications.  Ultrasound permanent image savedropivacaine (NAROPIN) 0.2% injection 20 mL - Perineural   10 mL - 2/28/2024 11:53:00 AM  Needle  Needle type: Stimuplex   Needle gauge: 20 G  Needle length: 4 in  Needle localization: anatomical landmarks and ultrasound guidance  Assessment  Injection assessment: incremental injection, frequent aspiration, injected with ease, negative aspiration, negative for heart rate change, no paresthesia on injection, no symptoms of intraneural/intravenous injection and needle tip visualized at all times  Paresthesia pain: none  Post-procedure:  site cleaned  patient tolerated the procedure well with no immediate complications

## 2024-02-28 NOTE — OP NOTE
OPERATIVE REPORT  PATIENT NAME: Magali Stewart    :  1955  MRN: [id number]  Pt Location:  OR ROOM 02    SURGERY DATE: 2024    Surgeons and Role:     * Neeru Lovell DO - Primary     * Rivas Conner PA-C - Assisting     * Jero Campos MD - Assisting    Preop Diagnosis:  Right shoulder pain, unspecified chronicity [M25.511]  Glenohumeral arthritis, right [M19.011]  Pre-op evaluation [Z01.818]    Post-Op Diagnosis Codes:     * Right shoulder pain, unspecified chronicity [M25.511]     * Glenohumeral arthritis, right [M19.011]     * Pre-op evaluation [Z01.818]    Procedure(s):  Right - ARTHROPLASTY SHOULDER- right anatomic.Biceps tenodesis.    Specimen(s):  * No specimens in log *    Estimated Blood Loss:   50 mL    Drains:  * No LDAs found *    Anesthesia Type:   General w/ Interscalene Block    Operative Indications:  Right shoulder pain, unspecified chronicity [M25.511]  Glenohumeral arthritis, right [M19.011]  Pre-op evaluation [Z01.818]      Operative Findings:  Severe glenohumeral joint osteoarthritis    Complications:   None    Procedure and Technique:      IMPLANTS:  Tornier: small 35 cortiloc glenoid, 1 nucleus, 43x16 head Simpliciti    INDICATIONS AND HISTORY:  This is a 68 y.o. female with chronic right shoulder pain and dysfunction.  The patient was diagnosed with glenohumeral joint osteoarthritis and failed conservative management.  The patient is indicated for total shoulder arthroplasty.  Patient understood the risks and benefits of the procedure with risks including pain, stiffness, fracture, failure of the prosthesis, glenoid loosening, neurovascular injury, infection, rotator cuff tear, blood loss, blood clots, stroke, heart attack, all up to including death.  Patient understood and did consent for surgery today.    DESCRIPTION OF OPERATION:  Patient was seen in the preoperative holding area where the operative extremity marked.  Patient was taken to the operating room and placed in  supine position.  The operative extremity was prepped and draped in the usual sterile fashion.  A time-out was called.  Patient was administered IV antibiotics prior to incision.  Using a 10 blade knife a deltopectoral skin incision was made.  Subcutaneous dissection was taken down to cephalic vein which was identified.  This was retracted medially.  The clavipectoral fascia was then identified and incised.  The conjoined tendon was then identified and retractors placed underneath the conjoined tendon and deltoid.  The long head biceps was then identified and unroofed using a Bovie cautery.  The long head of biceps was tenodesed to the pectoralis major tendon using a # 2 FiberWire.  The proximal stump was then amputated near the joint.  The subscapularis tendon was then identified and a fiber tape stitch was placed in the subscap tendon in a Rush stitch type fashion.  A lesser tuberosity osteotomy was then performed using combination of the oscillating saw and osteotome.  The subscapularis was reflected using a Bovie cautery staying against the bone and protecting the axillary nerve.  The humeral head was then dislocated.  A humeral head osteotomy was then performed with an oscillating saw.  This was saved for bone graft. A center pin was then placed in the middle of the humeral metaphysis.  A punch was then used to insert the appropriate size nucleus trial implant.  A humeral head protector was then placed and the glenoid was then exposed.  A pan-labral excision was then performed using a Bovie cautery.  The full glenoid was then exposed and a center guide pin was placed.  The appropriate sized poly trial was seated for the glenoid.  A central pin was placed through the guide.  Sequential reaming was then performed down to subchondral bone.  Once this was done a glenoid peg guide instrument was placed down the center of the glenoid and sequential drill holes were made peripherally.  A central drill hole was then  made over the center guide pin.  Once this was done the glenoid was copiously irrigated.  The peripheral holes were cemented and the middle hole was left uncemented.  The final poly implant was then inserted and impacted using the impactor mallet.  Good seating and adequate fit of the whole glenoid was accomplished.  Once the glenoid was completed attention was brought back to the humeral side.  A trial humeral head implant was placed.  The shoulder was reduced and under fluoroscopic imaging demonstrated well placed implant with a good size head that was not overstuffed.  About 50% posterior translation was achieved with these trial implants.  Once this was done the trial implants were removed.  Three drill holes were made medial and lateral to the lesser tuberosity and # 2 Fiber Loop stitches were placed from lateral to medial for repair of the subscapularis tendon osteotomy.  The final implants were then impacted into the humerus with good press-fit.  This was again visualized intraoperatively with fluoroscopy and showed well placed implants with no signs of periprosthetic fracture.  Once this was done the lesser tuberosity osteotomy was repaired using heavy braided sutures and was passed just medial to the bone tendon junction of the osteotomy.  These were then tied down in a ratchet hitch type fashion and the strands were brought down to a lateral row using two 4.75 Arthrex SwiveLock anchors in the greater tuberosity.  This completed the double row repair of the lesser tuberosity osteotomy. The rotator interval was then closed using # 2 FiberWire.  Once the subscapularis was repaired copious irrigation of the wound was performed.  Vancomycin powder was placed deep to the deltopectoral interval.   The deltopectoral interval was closed with # 0 Ethibond.  Subcutaneous closure of the skin was performed with 2-0 Monocryl and the epidermis was closed with 3-0 Monocryl in running subcuticular fashion.  Exofin was then  placed.  Dressings included Mepilex dressing.  The patient was placed in a shoulder immobilizer.  There was no complication throughout the case.  The patient was placed in PACU in stable condition.  The patient tolerated the procedure well.     I was present for the entire procedure. and A physician assistant was required during the procedure for retraction, tissue handling, dissection and suturing.    Patient Disposition:  PACU         SIGNATURE: Neeru Lovell DO  DATE: February 28, 2024  TIME: 2:52 PM

## 2024-02-28 NOTE — ANESTHESIA POSTPROCEDURE EVALUATION
Post-Op Assessment Note    CV Status:  Stable  Pain Score: 2    Pain management: adequate    Multimodal analgesia used between 6 hours prior to anesthesia start to PACU discharge    Mental Status:  Alert and awake   Hydration Status:  Euvolemic   PONV Controlled:  Controlled   Airway Patency:  Patent  Airway: intubated  Two or more mitigation strategies used for obstructive sleep apnea   Post Op Vitals Reviewed: Yes    No anethesia notable event occurred.    Staff: Anesthesiologist, with CRNAs               BP   168/73   Temp   97.5   Pulse  83   Resp   14   SpO2   99%

## 2024-02-28 NOTE — H&P
"Patient Name:  Magali Stewart  MRN:  [id number]      Assessment & Plan     Right shoulder glenohumeral arthritis  1.  Plan for right total shoulder arthroplasty today    Chief Complaint     Right shoulder pain    History of the Present Illness     Magali Stewart is a 68 y.o. female with right shoulder pain and limitation activity.  This is chronic pain.  Pain 6/10.  SANE score 50%.  She is taken anti-inflammatories.  She has not any injections.  No fall or trauma.  She is here to proceed with surgical intervention.    Physical Exam     BP (!) 173/76   Pulse 102   Temp 98.3 °F (36.8 °C) (Tympanic)   Resp 14   Ht 5' 3\" (1.6 m)   Wt 73.6 kg (162 lb 4.8 oz)   SpO2 96%   BMI 28.75 kg/m²     RIGHT SHOULDER:     Appearance: normal     Forward flexion:   140 degrees active, 180 passive  Abduction:  80 degrees   External rotation at 0 degrees:   30 degrees, passive 40  Internal rotation: right buttock     STRENGTH:  Forward flexion:  5/5   External rotation:  5/5   Internal rotation:  5/5    Eyes:  Anicteric sclerae.  ENT:  Trachea midline.  Lungs:  Clear to auscultation bilaterally.  Heart:  Regular rate and rhythm with audible S1 and S2.  Abdomen:  Soft and nontender.  Lymph:  No palpable lymphadenopathy.  Skin:  Intact without erythema.  Neuro:  Sensation grossly intact to light touch.  Psych:  Mood and affect are appropriate.    Data Review     I have personally reviewed pertinent films in PACS, and my interpretation follows.    X-ray of the right shoulder was taken and reviewed demonstrates severe glenohumeral arthritis with loss of joint space, cystic changes and osteophyte formation.    I have personally reviewed pertinent lab results.    Lab Results   Component Value Date    K 4.5 02/10/2024     02/10/2024    CO2 30 02/10/2024    BUN 18 02/10/2024    CREATININE 0.68 02/10/2024     Lab Results   Component Value Date    WBC 5.14 02/10/2024    HGB 13.8 02/10/2024     02/10/2024     Lab Results "   Component Value Date    INR 0.93 02/10/2024    PTT 27 02/10/2024       Past Medical History:   Diagnosis Date    Allergic 1970    Allergic rhinitis due to pollen 09/18/2020    Arthritis     hands    Asthma     Obesity     Sleep apnea     Uses CPAP       Past Surgical History:   Procedure Laterality Date    JOINT REPLACEMENT      KNEE SURGERY Bilateral     REPLACEMENT    TONSILLECTOMY      TUBAL LIGATION         No Known Allergies    No current facility-administered medications on file prior to encounter.     Current Outpatient Medications on File Prior to Encounter   Medication Sig Dispense Refill    ezetimibe (ZETIA) 10 mg tablet TAKE 1 TABLET DAILY 90 tablet 3    mometasone (NASONEX) 50 mcg/act nasal spray USE 1 SPRAY IN EACH NOSTRIL TWICE A DAY 51 g 3    naproxen (NAPROSYN) 500 mg tablet TAKE 1 TABLET TWICE A DAY WITH MEALS 180 tablet 3    Elastic Bandages & Supports (Splint Wrist Brace/Left-Right) MISC Use daily at bedtime 1 each 0       Social History     Tobacco Use    Smoking status: Former     Current packs/day: 0.00     Average packs/day: 1 pack/day for 30.3 years (30.3 ttl pk-yrs)     Types: Cigarettes     Start date: 6/6/1978     Quit date: 9/18/2008     Years since quitting: 15.4    Smokeless tobacco: Never   Vaping Use    Vaping status: Never Used   Substance Use Topics    Alcohol use: Yes     Alcohol/week: 2.0 standard drinks of alcohol     Types: 1 Cans of beer, 1 Standard drinks or equivalent per week    Drug use: Never       Family History   Problem Relation Age of Onset    Other Mother         maintenance procedure for cardiac pacemaker system     Breast cancer Maternal Grandmother     Breast cancer Paternal Grandmother        Review of Systems     As stated in the HPI. All other systems were reviewed and are negative.

## 2024-03-11 ENCOUNTER — APPOINTMENT (OUTPATIENT)
Dept: RADIOLOGY | Facility: MEDICAL CENTER | Age: 69
End: 2024-03-11
Payer: MEDICARE

## 2024-03-11 ENCOUNTER — OFFICE VISIT (OUTPATIENT)
Dept: OBGYN CLINIC | Facility: MEDICAL CENTER | Age: 69
End: 2024-03-11

## 2024-03-11 VITALS
DIASTOLIC BLOOD PRESSURE: 82 MMHG | WEIGHT: 162 LBS | SYSTOLIC BLOOD PRESSURE: 155 MMHG | HEIGHT: 63 IN | BODY MASS INDEX: 28.7 KG/M2 | HEART RATE: 88 BPM

## 2024-03-11 DIAGNOSIS — Z96.611 STATUS POST TOTAL SHOULDER ARTHROPLASTY, RIGHT: ICD-10-CM

## 2024-03-11 DIAGNOSIS — Z96.611 STATUS POST TOTAL SHOULDER ARTHROPLASTY, RIGHT: Primary | ICD-10-CM

## 2024-03-11 PROCEDURE — 73030 X-RAY EXAM OF SHOULDER: CPT

## 2024-03-11 PROCEDURE — 99024 POSTOP FOLLOW-UP VISIT: CPT | Performed by: ORTHOPAEDIC SURGERY

## 2024-03-11 NOTE — PATIENT INSTRUCTIONS
TOTAL SHOULDER ARTHROPLASTY PHYSICAL THERAPY PROTOCOL     Immediate Post-operative Period    Wear sling in public and to sleep for 6 weeks   PROM   Weeks 1-4     Weeks 4-6   FE: 0-130         130-full   ER: 0-30           30-full   NO extension x 6 weeks    AAROM (wand, self-stretch)    Ice 3-4x daily     7 Days-2 Weeks (Goal: Protect subscapularis healing)    Wear sling in public x 6 weeks    NO UBE    Isometrics for all shoulder motions within pain-free ROM - NO ACTIVE Internal Rotation    Ice following exercises     3 - 6 weeks (Goal: full PROM at end of 6th week)    No resistive exercises    No weights    Active range of motion - supine (no weights)     6-12 weeks (Goal: increase function and strength)    Resistive exercises    Therabands - home strengthening    Weights - less than 5 lbs

## 2024-03-11 NOTE — PROGRESS NOTES
[facility] [place]  [address]  [place] [address]       Magali Stewart  [id number]  1955    ORTHOPAEDIC SURGERY OUTPATIENT NOTE  3/11/2024      HISTORY:  68 y.o. female presents for initial postop visit status post right anatomic total shoulder arthroplasty on 2/20/2024.  She is doing well.  Pain is controlled.  She denies numbness or tingling in the hand.  No significant swelling.    Past Medical History:   Diagnosis Date    Allergic 1970    Allergic rhinitis due to pollen 09/18/2020    Arthritis     hands    Asthma     Obesity     Sleep apnea     Uses CPAP       Past Surgical History:   Procedure Laterality Date    JOINT REPLACEMENT      KNEE SURGERY Bilateral     REPLACEMENT    [place] ARTHROPLASTY GLENOHUMERAL JOINT TOTAL SHOULDER Right 2/28/2024    Procedure: ARTHROPLASTY SHOULDER- right anatomic,Biceps tenodesis.;  Surgeon: Neeru Lovell DO;  Location:  MAIN OR;  Service: Orthopedics    TONSILLECTOMY      TUBAL LIGATION         Social History     Socioeconomic History    Marital status: /Civil Union     Spouse name: Not on file    Number of children: Not on file    Years of education: Not on file    Highest education level: Not on file   Occupational History    Occupation: RETIRED   Tobacco Use    Smoking status: Former     Current packs/day: 0.00     Average packs/day: 1 pack/day for 30.3 years (30.3 ttl pk-yrs)     Types: Cigarettes     Start date: 6/6/1978     Quit date: 9/18/2008     Years since quitting: 15.4    Smokeless tobacco: Never   Vaping Use    Vaping status: Never Used   Substance and Sexual Activity    Alcohol use: Yes     Alcohol/week: 2.0 standard drinks of alcohol     Types: 1 Cans of beer, 1 Standard drinks or equivalent per week    Drug use: Never    Sexual activity: Yes     Partners: Male     Birth control/protection: Surgical   Other Topics Concern    Not on file   Social History Narrative    · Do you currently or  "have you served in the [employer]:   No      · Were you activated, into active duty, as a member of the [employer] or as a Reservist:   No      · Exercise level:   None      · Diet:   Regular      · General stress level:   Low      · ·Caffeine intake:   Moderate      · Chewing tobacco:   none      · Seat belts used routinely:   Yes      · Sunscreen used routinely:   No      · Smoke alarm in home:   Yes      ·      Social Determinants of Health     Financial Resource Strain: Low Risk  (10/20/2023)    Overall Financial Resource Strain (CARDIA)     Difficulty of Paying Living Expenses: Not very hard   Food Insecurity: Not on file   Transportation Needs: No Transportation Needs (10/20/2023)    PRAPARE - Transportation     Lack of Transportation (Medical): No     Lack of Transportation (Non-Medical): No   Physical Activity: Not on file   Stress: Not on file   Social Connections: Not on file   Intimate Partner Violence: Not on file   Housing Stability: Not on file       Family History   Problem Relation Age of Onset    Other Mother         maintenance procedure for cardiac pacemaker system     Breast cancer Maternal Grandmother     Breast cancer Paternal Grandmother         Patient's Medications   New Prescriptions    No medications on file   Previous Medications    ELASTIC BANDAGES & SUPPORTS (SPLINT WRIST BRACE/LEFT-RIGHT) MISC    Use daily at bedtime    EZETIMIBE (ZETIA) 10 MG TABLET    TAKE 1 TABLET DAILY    MOMETASONE (NASONEX) 50 MCG/ACT NASAL SPRAY    USE 1 SPRAY IN EACH NOSTRIL TWICE A DAY    PHENTERMINE (ADIPEX-P) 37.5 MG TABLET    TAKE ONE-HALF (1/2) TABLET DAILY   Modified Medications    No medications on file   Discontinued Medications    No medications on file       No Known Allergies     /82 (BP Location: Left arm, Patient Position: Sitting, Cuff Size: Standard)   Pulse 88   Ht 5' 3\" (1.6 m)   Wt 73.5 kg (162 lb)   BMI 28.70 kg/m²      REVIEW OF SYSTEMS:  Constitutional: Negative.  " "  HEENT: Negative.    Respiratory: Negative.    Skin: Negative.    Neurological: Negative.    Psychiatric/Behavioral: Negative.  Musculoskeletal: Negative except for that mentioned in the HPI.    /82 (BP Location: Left arm, Patient Position: Sitting, Cuff Size: Standard)   Pulse 88   Ht 5' 3\" (1.6 m)   Wt 73.5 kg (162 lb)   BMI 28.70 kg/m²   Gen: No acute distress, resting comfortably in bed  HEENT: Eyes clear, moist mucus membranes, hearing intact  Respiratory: No audible wheezing or stridor  Cardiovascular: Well Perfused peripherally, 2+ distal pulse  Abdomen: nondistended, no peritoneal signs     PHYSICAL EXAM:    Right shoulder:  Incision clean/dry/intact  Sutures tails clipped  Incision tact the hand  Brisk up refill  No erythema or warmth    IMAGING: X-ray of the shoulder was taken the office today.  This was reviewed and demonstrates intact anatomic total shoulder arthroplasty in stable position without signs of loosening or failure    ASSESSMENT AND PLAN:  68 y.o. female 2 weeks status post right anatomic total shoulder arthroplasty.  Sutures removed.  She may shower but do not soak or scrub the incisions.  We discussed continued sling use.  She will follow the protocol.  This was printed and given to her.  She will start physical therapy.  Will see her back in 5 weeks with repeat x-ray.        "

## 2024-03-13 ENCOUNTER — EVALUATION (OUTPATIENT)
Dept: PHYSICAL THERAPY | Facility: MEDICAL CENTER | Age: 69
End: 2024-03-13
Attending: PHYSICIAN ASSISTANT
Payer: MEDICARE

## 2024-03-13 DIAGNOSIS — Z96.611 STATUS POST TOTAL SHOULDER ARTHROPLASTY, RIGHT: Primary | ICD-10-CM

## 2024-03-13 PROCEDURE — 97112 NEUROMUSCULAR REEDUCATION: CPT

## 2024-03-13 PROCEDURE — 97161 PT EVAL LOW COMPLEX 20 MIN: CPT

## 2024-03-13 PROCEDURE — 97140 MANUAL THERAPY 1/> REGIONS: CPT

## 2024-03-13 NOTE — PROGRESS NOTES
PT Evaluation     Today's date: 3/13/2024  Patient name: Magali Stewart  : 1955  MRN: [id number]  Referring provider: Rivas Conner PA-C  Dx:   Encounter Diagnosis     ICD-10-CM    1. Status post total shoulder arthroplasty, right  Z96.611 Ambulatory referral to Physical Therapy        Eval/Re-Eval POC Expires Auth #/ Referral # Total Visits Start Date Expiration Date Extension Info Visits Limitation   3/13  Medicare - no auth  BOMN        BOMN                                                  1 2 3 4 5 6   3/13        7 8 9 10 11 12           13 14 15 16 17 18           19 20 21 22 23 24           25 26 27 28 29 30               Start Time: 1425  Stop Time: 1530  Total time in clinic (min): 65 minutes    Assessment  Assessment details: Magali Stewart is a 68 y.o. female who presents to PT with a referral from Dr. Conner for a medical diagnosis of Status post total shoulder arthroplasty, right  (primary encounter diagnosis). Patient presents to PT with limitations in ROM, strength, functional mobility, and postural stability secondary to pain, decreased muscular endurance, decreased neuromuscular control, and MD protocol. Strength and AROM not tested today per protocol. Patient with deficits in PROM secondary to pain, tolerated PROM well. Patient noted some tenderness during palpation over anterior deltoid, biceps, bicipital groove, and biceps tendon. Patient able to complete HEP without increase pain. Patient's key impairments include: decreased ROM, decreased strength, decreased endurance, pain with functional activities, and poor body mechanics. The limitations listed above affect patient's ability to reach overhead, reach behind back, reach, lift/carry, push, pull, shower, get dressed, drive, exercise, and grasp, perform recreational activities and ADLs and decrease patient's quality of life. Patient to benefit from skilled PT to address these limitations,  increase ROM, improve strength, reduce pain,  improve activity tolerance, and improve quality of life   Impairments: abnormal muscle firing, abnormal muscle tone, abnormal or restricted ROM, activity intolerance, impaired physical strength, lacks appropriate home exercise program, pain with function, weight-bearing intolerance, poor posture  and poor body mechanics    Symptom irritability: lowUnderstanding of Dx/Px/POC: good   Prognosis: good    Goals  STG (2-4 Weeks)  Patient will have an increase in R shoulder flexion PROM to 90 degrees in 4 weeks to promote increased mobility in 4 weeks.  Patient will have a decrease in pain at worst by 2 points on the NPRS to improve quality of life in 4 weeks.  Patient will be efficient and compliant with comprehensive HEP in 4 weeks.    LTG (4-8 Weeks)  Patient will have a FOTO of anticipated or greater by discharge  Patient will be able to reach to shoulder height without pain to help with getting items off shelves by discharge  Patient will be able to get dressed without pain to improve quality of life by discharge.    Plan  Patient would benefit from: skilled physical therapy  Planned modality interventions: TENS, thermotherapy: hydrocollator packs, cryotherapy, electrical stimulation/Russian stimulation, traction and unattended electrical stimulation  Planned therapy interventions: abdominal trunk stabilization, IASTM, joint mobilization, activity modification, kinesiology taping, ADL training, manual therapy, massage, Yousif taping, balance, balance/weight bearing training, motor coordination training, behavior modification, muscle pump exercises, body mechanics training, nerve gliding, neuromuscular re-education, breathing training, patient education, postural training, coordination, self care, transfer training, therapeutic training, therapeutic exercise, therapeutic activities, stretching, strengthening, fine motor coordination training, flexibility, functional ROM exercises, gait training, graded activity,  graded exercise, graded motor, home exercise program, IADL retraining and work reintegration  Frequency: 2x week  Duration in weeks: 12  Plan of Care beginning date: 3/13/2024  Plan of Care expiration date: 2024  Treatment plan discussed with: patient        Subjective Evaluation    History of Present Illness  Date of surgery: 2024.  Mechanism of injury: surgery  Mechanism of injury: Patient reports to PT s/p R TSA that occurred on 2024. Patient rated their current pain 1/10, at its worst 10/10, and at its best 0/10 on the NPRS. Patient described the pain as dull and achy and stated that it lasts less than 15 minutes. Patient stated that ice, OTC NSAIDS, and rest makes it better, and reaching, reaching overhead, reaching behind back, carrying, pushing, pulling, lifting, and driving makes it worse. Patient reported that the pain is worse depending on if she accidentally moves it around and denies waking them at night. Patient denies changes to  bowel and bladder, chest pain, night pain, or fever. Patient reports no prior treatment for CC. Patient's goals of PT are to decrease pain, improve ROM, and increase strength   Quality of life: good    Patient Goals  Patient goals for therapy: decreased pain, increased motion and increased strength    Pain  Current pain ratin  At best pain ratin  At worst pain rating: 10  Quality: dull ache  Relieving factors: ice, relaxation, rest, support and medications  Aggravating factors: overhead activity and lifting  Progression: no change          Objective     Postural Observations  Seated posture: fair  Standing posture: fair      Observations     Right Shoulder  Positive for incision.     Palpation   Left   No palpable tenderness to the biceps.     Right   No palpable tenderness to the infraspinatus, latissimus, middle deltoid, middle trapezius, posterior deltoid, rhomboids, subscapularis, supraspinatus, triceps and upper trapezius.   Tenderness of the  anterior deltoid, biceps and pectoralis major.     Tenderness     Right Shoulder  Tenderness in the biceps tendon (proximal) and bicipital groove.     Neurological Testing     Sensation     Shoulder   Left Shoulder   Intact: light touch    Right Shoulder   Intact: Light touch    Active Range of Motion     Additional Active Range of Motion Details  NT per protocol    Passive Range of Motion     Right Shoulder   Flexion: 85 degrees with pain  Abduction: 100 degrees with pain  External rotation 0°: 15 degrees with pain    Strength/Myotome Testing     Additional Strength Details  NT per protocol      HEP Demonstrated and Performed:  Access Code: [email or web address]  URL: [email or web address]  Date: 03/13/2024  Prepared by: Sen Hellre    Exercises  - Towel Roll Squeeze  - 2 x daily - 7 x weekly - 2 sets - 10 reps - 5s hold  - Supported Elbow Flexion Extension PROM  - 2 x daily - 7 x weekly - 2 sets - 10 reps  - Standing Isometric Shoulder Flexion with Doorway - Arm Bent  - 2 x daily - 7 x weekly - 2 sets - 5 reps - 5s hold  - Standing Isometric Shoulder Extension with Doorway - Arm Bent  - 2 x daily - 7 x weekly - 2 sets - 5 reps - 5s hold  - Standing Isometric Shoulder Abduction with Doorway - Arm Bent  - 2 x daily - 7 x weekly - 2 sets - 5 reps - 5s hold  - Standing Isometric Shoulder External Rotation with Doorway and Towel Roll  - 2 x daily - 7 x weekly - 2 sets - 5 reps - 5s hold  - Seated Shoulder External Rotation AAROM with Cane and Hand in Neutral  - 2 x daily - 7 x weekly - 2 sets - 5 reps - 5s hold  - Shoulder Flexion Overhead with Dowel  - 2 x daily - 7 x weekly - 2 sets - 10 reps           Precautions: standard precaution,   Past Medical History:   Diagnosis Date    Allergic 1970    Allergic rhinitis due to pollen 09/18/2020    Arthritis     hands    Asthma     Obesity     Sleep apnea     Uses CPAP         PT 1:1 entire time  Manuals             PROM R Shoulder             Jt Sharon              STM/Massage Gun                          Neuro Re-Ed             Scapular Retractions             Biceps AROM             Cane AAROM             GH Isos             Towel Squeezes                                       Ther Ex             Pulleys             Table Slides                                                                                           Ther Activity                                       Gait Training                                       Modalities

## 2024-03-18 ENCOUNTER — OFFICE VISIT (OUTPATIENT)
Dept: PHYSICAL THERAPY | Facility: MEDICAL CENTER | Age: 69
End: 2024-03-18
Attending: PHYSICIAN ASSISTANT
Payer: MEDICARE

## 2024-03-18 DIAGNOSIS — Z96.611 STATUS POST TOTAL SHOULDER ARTHROPLASTY, RIGHT: Primary | ICD-10-CM

## 2024-03-18 PROCEDURE — 97112 NEUROMUSCULAR REEDUCATION: CPT

## 2024-03-18 PROCEDURE — 97110 THERAPEUTIC EXERCISES: CPT

## 2024-03-18 PROCEDURE — 97140 MANUAL THERAPY 1/> REGIONS: CPT

## 2024-03-18 NOTE — PROGRESS NOTES
"Daily Note     Today's date: 3/18/2024  Patient name: Magali Stewart  : 1955  MRN: [id number]  Referring provider: Rivas Conner PA-C  Dx:   Encounter Diagnosis     ICD-10-CM    1. Status post total shoulder arthroplasty, right  Z96.611         Eval/Re-Eval POC Expires Auth #/ Referral # Total Visits Start Date Expiration Date Extension Info Visits Limitation   3/13  Medicare - no auth  BOMN        BOMN                                                  1 2 3 4 5 6   3/13 3/18       7 8 9 10 11 12           13 14 15 16 17 18           19 20 21 22 23 24           25 26 27 28 29 30             Start Time: 1500  Stop Time: 1540  Total time in clinic (min): 40 minutes    Subjective: Patient stated that her arm has been feeling good      Objective: See treatment diary below      Assessment: Tolerated treatment well. Patient needed verbal cues to correct pendulum exercise, struggled with PROM. Patient able to complete all other exercises without increase in pain. Deficits still present in shoulder flexion, abduction, and ER ROM with PROM. Patient demonstrated fatigue post treatment, exhibited good technique with therapeutic exercises, and would benefit from continued PT      Plan: Continue per plan of care.      Precautions: standard precaution,   Past Medical History:   Diagnosis Date    Allergic 1970    Allergic rhinitis due to pollen 2020    Arthritis     hands    Asthma     Obesity     Sleep apnea     Uses CPAP         PT 1:1 entire time  Manuals 3/18            PROM R Shoulder IB            Jt Mobes             STM/Massage Gun                          Neuro Re-Ed             Scapular Retractions 20x            Bicep Curls 15x AAROM            Cane AAROM 15x flex/abd/ER            GH Isos 5x 5\" hold            Towel Squeezes             Pendulums 10x ea CW/CCW                         Ther Ex             Pulleys 5'            Table Slides 15x flex/abd with ball            UT Stretch 30\" 3x ea              "                                                                Ther Activity                                       Gait Training                                       Modalities

## 2024-03-20 ENCOUNTER — OFFICE VISIT (OUTPATIENT)
Dept: PHYSICAL THERAPY | Facility: MEDICAL CENTER | Age: 69
End: 2024-03-20
Attending: PHYSICIAN ASSISTANT
Payer: MEDICARE

## 2024-03-20 DIAGNOSIS — Z96.611 STATUS POST TOTAL SHOULDER ARTHROPLASTY, RIGHT: Primary | ICD-10-CM

## 2024-03-20 PROCEDURE — 97112 NEUROMUSCULAR REEDUCATION: CPT

## 2024-03-20 PROCEDURE — 97110 THERAPEUTIC EXERCISES: CPT

## 2024-03-20 PROCEDURE — 97140 MANUAL THERAPY 1/> REGIONS: CPT

## 2024-03-20 NOTE — PROGRESS NOTES
"Daily Note     Today's date: 3/20/2024  Patient name: Magali Stewart  : 1955  MRN: [id number]  Referring provider: Rivas Conner PA-C  Dx:   Encounter Diagnosis     ICD-10-CM    1. Status post total shoulder arthroplasty, right  Z96.611         Eval/Re-Eval POC Expires Auth #/ Referral # Total Visits Start Date Expiration Date Extension Info Visits Limitation   3/13  Medicare - no auth  BOMN        BOMN                                                  1 2 3 4 5 6   3/13 3/18 3/20      7 8 9 10 11 12           13 14 15 16 17 18           19 20 21 22 23 24           25 26 27 28 29 30             Start Time: 1628  Stop Time: 1706  Total time in clinic (min): 38 minutes    Subjective: Patient stated that her arm is a little sore today.      Objective: See treatment diary below      Assessment: Tolerated treatment well. Patient able to complete exercises without increase in pain. Patient progressed with exercises per protocol and tolerance. Patient demonstrated fatigue post treatment, exhibited good technique with therapeutic exercises, and would benefit from continued PT      Plan: Continue per plan of care.  Progress treament per protocol.      Precautions: standard precaution,   Past Medical History:   Diagnosis Date    Allergic 1970    Allergic rhinitis due to pollen 2020    Arthritis     hands    Asthma     Obesity     Sleep apnea     Uses CPAP         PT 1:1 entire time  Manuals 3/18 3/20           PROM R Shoulder IB IB           Jt Mobes             STM/Massage Gun                          Neuro Re-Ed             Scapular Retractions 20x 20x           Bicep Curls 15x AAROM 15x AAROM           Cane AAROM 15x flex/abd/ER 15x flex/abd/ER           Supine AROM             GH Isos 5x 5\" hold 5x 5\" hold           Towel Squeezes             Pendulums 10x ea CW/CCW 20x ea CW/CCW                        Ther Ex             Pulleys 5' 5'           Table Slides 15x flex/abd with ball 15x flex/abd with ball     " "      UT Stretch 30\" 3x ea 30\" 3x ea                                                                            Ther Activity                                       Gait Training                                       Modalities                                            "

## 2024-03-25 ENCOUNTER — OFFICE VISIT (OUTPATIENT)
Dept: PHYSICAL THERAPY | Facility: MEDICAL CENTER | Age: 69
End: 2024-03-25
Attending: PHYSICIAN ASSISTANT
Payer: MEDICARE

## 2024-03-25 DIAGNOSIS — Z96.611 STATUS POST TOTAL SHOULDER ARTHROPLASTY, RIGHT: Primary | ICD-10-CM

## 2024-03-25 PROCEDURE — 97110 THERAPEUTIC EXERCISES: CPT

## 2024-03-25 PROCEDURE — 97140 MANUAL THERAPY 1/> REGIONS: CPT

## 2024-03-25 PROCEDURE — 97112 NEUROMUSCULAR REEDUCATION: CPT

## 2024-03-25 NOTE — PROGRESS NOTES
"Daily Note     Today's date: 3/25/2024  Patient name: Magali Stewart  : 1955  MRN: [id number]  Referring provider: Rivas Conner PA-C  Dx:   Encounter Diagnosis     ICD-10-CM    1. Status post total shoulder arthroplasty, right  Z96.611         Eval/Re-Eval POC Expires Auth #/ Referral # Total Visits Start Date Expiration Date Extension Info Visits Limitation   3/13  Medicare - no auth  BOMN        BOMN                                                  1 2 3 4 5 6   3/13 3/18 3/20 3/25     7 8 9 10 11 12           13 14 15 16 17 18           19 20 21 22 23 24           25 26 27 28 29 30             Start Time: 1100  Stop Time: 1145  Total time in clinic (min): 45 minutes    Subjective: Patient stated that her shoulder is feeling good today.       Objective: See treatment diary below      Assessment: Tolerated treatment well. Patient needed to use dowel to assist with supine flexion and abduction due to weakness.. Patient able to complete all other exercises without increase in pain. Patient demonstrated fatigue post treatment, exhibited good technique with therapeutic exercises, and would benefit from continued PT      Plan: Continue per plan of care.  Progress treament per protocol.      Precautions: standard precaution,   Past Medical History:   Diagnosis Date    Allergic 1970    Allergic rhinitis due to pollen 2020    Arthritis     hands    Asthma     Obesity     Sleep apnea     Uses CPAP         PT 1:1 [id number]  Manuals 3/18 3/20 3/25          PROM R Shoulder IB IB IB          Jt Mobes             STM/Massage Gun                          Neuro Re-Ed             Scapular Retractions 20x 20x 20x          Bicep Curls 15x AAROM 15x AAROM 15x AAROM          Cane AAROM 15x flex/abd/ER 15x flex/abd/ER 15x flex/abd/ER          Shoulder AROM   Supine 15x ea flex/abd AAROM          GH Isos 5x 5\" hold 5x 5\" hold           Towel Squeezes             Pendulums 10x ea CW/CCW 20x ea CW/CCW 20x ea CW/CCW        " "               Ther Ex             Pulleys 5' 5' 7'          Table Slides 15x flex/abd with ball 15x flex/abd with ball 15x flex/abd with ball          UT Stretch 30\" 3x ea 30\" 3x ea 30\" 3x ea                                                                           Ther Activity                                       Gait Training                                       Modalities                                            "

## 2024-03-27 ENCOUNTER — OFFICE VISIT (OUTPATIENT)
Dept: PHYSICAL THERAPY | Facility: MEDICAL CENTER | Age: 69
End: 2024-03-27
Attending: PHYSICIAN ASSISTANT
Payer: MEDICARE

## 2024-03-27 DIAGNOSIS — Z96.611 STATUS POST TOTAL SHOULDER ARTHROPLASTY, RIGHT: Primary | ICD-10-CM

## 2024-03-27 PROCEDURE — 97140 MANUAL THERAPY 1/> REGIONS: CPT

## 2024-03-27 PROCEDURE — 97112 NEUROMUSCULAR REEDUCATION: CPT

## 2024-03-27 PROCEDURE — 97110 THERAPEUTIC EXERCISES: CPT

## 2024-03-27 NOTE — PROGRESS NOTES
"Daily Note     Today's date: 3/27/2024  Patient name: Magali Stewart  : 1955  MRN: [id number]  Referring provider: Rivas Conner PA-C  Dx:   Encounter Diagnosis     ICD-10-CM    1. Status post total shoulder arthroplasty, right  Z96.611         Eval/Re-Eval POC Expires Auth #/ Referral # Total Visits Start Date Expiration Date Extension Info Visits Limitation   3/13  Medicare - no auth  BOMN        BOMN                                                  1 2 3 4 5 6   3/13 3/18 3/20 3/25 3/27    7 8 9 10 11 12           13 14 15 16 17 18           19 20 21 22 23 24           25 26 27 28 29 30             Start Time: 1453  Stop Time: 1539  Total time in clinic (min): 46 minutes    Subjective: Patient stated that her shoulder feels good to be out of the sling. She stated that her pain is very mild today.       Objective: See treatment diary below      Assessment: Tolerated treatment well. Patient demonstrated improvements in supine AROM today. Added finger ladder today, tolerated well. Patient demonstrated fatigue post treatment, exhibited good technique with therapeutic exercises, and would benefit from continued PT      Plan: Continue per plan of care.  Progress treament per protocol.      Precautions: standard precaution,   Past Medical History:   Diagnosis Date    Allergic 1970    Allergic rhinitis due to pollen 2020    Arthritis     hands    Asthma     Obesity     Sleep apnea     Uses CPAP         PT 1:1 entire time  Manuals 3/18 3/20 3/25 3/27         PROM R Shoulder IB IB IB IB         Jt Mobes             STM/Massage Gun                          Neuro Re-Ed             Scapular Retractions 20x 20x 20x 20x         Bicep Curls 15x AAROM 15x AAROM 15x AAROM 15x AAROM         Cane AAROM 15x flex/abd/ER 15x flex/abd/ER 15x flex/abd/ER 15x flex/abd/ER         Shoulder AROM   Supine 15x ea flex/abd AAROM Supine 2x10 ea flex/abd, 15x flex AAROM         GH Isos 5x 5\" hold 5x 5\" hold           Towel " "Squeezes             Pendulums 10x ea CW/CCW 20x ea CW/CCW 20x ea CW/CCW 20x ea CW/CCW                      Ther Ex             Pulleys 5' 5' 7' 7'         Finger Ladder    10x flex/abd         Table Slides 15x flex/abd with ball 15x flex/abd with ball 15x flex/abd with ball 20x flex/abd with ball         UT Stretch 30\" 3x ea 30\" 3x ea 30\" 3x ea HEP                                                                          Ther Activity                                       Gait Training                                       Modalities                                            "

## 2024-04-01 ENCOUNTER — OFFICE VISIT (OUTPATIENT)
Dept: PHYSICAL THERAPY | Facility: MEDICAL CENTER | Age: 69
End: 2024-04-01
Attending: PHYSICIAN ASSISTANT
Payer: MEDICARE

## 2024-04-01 DIAGNOSIS — Z96.611 STATUS POST TOTAL SHOULDER ARTHROPLASTY, RIGHT: Primary | ICD-10-CM

## 2024-04-01 PROCEDURE — 97112 NEUROMUSCULAR REEDUCATION: CPT

## 2024-04-01 PROCEDURE — 97110 THERAPEUTIC EXERCISES: CPT

## 2024-04-01 PROCEDURE — 97140 MANUAL THERAPY 1/> REGIONS: CPT

## 2024-04-01 NOTE — PROGRESS NOTES
Daily Note     Today's date: 2024  Patient name: Magali Stewart  : 1955  MRN: [id number]  Referring provider: Rivas Conner PA-C  Dx:   Encounter Diagnosis     ICD-10-CM    1. Status post total shoulder arthroplasty, right  Z96.611         Eval/Re-Eval POC Expires Auth #/ Referral # Total Visits Start Date Expiration Date Extension Info Visits Limitation   3/13  Medicare - no auth  BOMN        BOMN                                                  1 2 3 4 5 6   3/13 3/18 3/20 3/25 3/27 4/1   7 8 9 10 11 12           13 14 15 16 17 18           19 20 21 22 23 24           25 26 27 28 29 30             Start Time: 1230  Stop Time: 1314  Total time in clinic (min): 44 minutes    Subjective: Patient stated that her shoulder feels stiff today. She stated that she was not able to do her exercises much this weekend.      Objective: See treatment diary below      Assessment: Tolerated treatment well. Patient needed verbal cues to relax shoulder during finger ladder exercise. Patient continues to have deficits in shoulder ROM and strength. Patient demonstrated fatigue post treatment, exhibited good technique with therapeutic exercises, and would benefit from continued PT      Plan: Continue per plan of care.  Progress treament per protocol.      Precautions: standard precaution,   Past Medical History:   Diagnosis Date     1970    Allergic rhinitis due to pollen 2020    Arthritis     hands    Asthma     Obesity     Sleep apnea     Uses CPAP         PT 1:1 entire time  Manuals 3/18 3/20 3/25 3/27 4/1        PROM R Shoulder IB IB IB IB IB        Jt Mobes             STM/Massage Gun                          Neuro Re-Ed             Scapular Retractions 20x 20x 20x 20x 20x        Bicep Curls 15x AAROM 15x AAROM 15x AAROM 15x AAROM 20x        Cane AAROM 15x flex/abd/ER 15x flex/abd/ER 15x flex/abd/ER 15x flex/abd/ER 15x flex/abd/ER        Shoulder AROM   Supine 15x ea flex/abd AAROM Supine 2x10 ea  "flex/abd, 15x flex AAROM Supine 2x10 ea flex/abd, 15x flex AAROM        GH Isos 5x 5\" hold 5x 5\" hold           Towel Squeezes             Pendulums 10x ea CW/CCW 20x ea CW/CCW 20x ea CW/CCW 20x ea CW/CCW 20x ea CW/CCW                     Ther Ex             Pulleys 5' 5' 7' 7' 7'        Finger Ladder    10x flex/abd 10x flex/abd        Table Slides 15x flex/abd with ball 15x flex/abd with ball 15x flex/abd with ball 20x flex/abd with ball 20x flex/abd with ball        UT Stretch 30\" 3x ea 30\" 3x ea 30\" 3x ea HEP                                                                          Ther Activity                                       Gait Training                                       Modalities                                            "

## 2024-04-03 ENCOUNTER — OFFICE VISIT (OUTPATIENT)
Dept: PHYSICAL THERAPY | Facility: MEDICAL CENTER | Age: 69
End: 2024-04-03
Attending: PHYSICIAN ASSISTANT
Payer: MEDICARE

## 2024-04-03 DIAGNOSIS — Z96.611 STATUS POST TOTAL SHOULDER ARTHROPLASTY, RIGHT: Primary | ICD-10-CM

## 2024-04-03 PROCEDURE — 97110 THERAPEUTIC EXERCISES: CPT

## 2024-04-03 PROCEDURE — 97140 MANUAL THERAPY 1/> REGIONS: CPT

## 2024-04-03 PROCEDURE — 97112 NEUROMUSCULAR REEDUCATION: CPT

## 2024-04-03 NOTE — PROGRESS NOTES
"Daily Note     Today's date: 4/3/2024  Patient name: Magali Stewart  : 1955  MRN: [id number]  Referring provider: Rivas Conner PA-C  Dx:   Encounter Diagnosis     ICD-10-CM    1. Status post total shoulder arthroplasty, right  Z96.611         Eval/Re-Eval POC Expires Auth #/ Referral # Total Visits Start Date Expiration Date Extension Info Visits Limitation   3/13  Medicare - no auth  BOMN        BOMN                                                  1 2 3 4 5 6   3/13 3/18 3/20 3/25 3/27 4/1   7 8 9 10 11 12   4/3        13 14 15 16 17 18           19 20 21 22 23 24           25 26 27 28 29 30             Start Time: 1500  Stop Time: 1545  Total time in clinic (min): 45 minutes    Subjective: Patient stated that her shoulder feels pretty good.      Objective: See treatment diary below      Assessment: Tolerated treatment well.  Patient continues to have deficits in ROM and strength. Patient demonstrated fatigue post treatment, exhibited good technique with therapeutic exercises, and would benefit from continued PT      Plan: Continue per plan of care.  Progress treament per protocol.      Precautions: standard precaution,   Past Medical History:   Diagnosis Date    Allergic 1970    Allergic rhinitis due to pollen 2020    Arthritis     hands    Asthma     Obesity     Sleep apnea     Uses CPAP         PT 1:1 entire time  Manuals 3/18 3/20 3/25 3/27 4/1 4/3       PROM R Shoulder IB IB IB IB IB IB       Jt Mobes             STM/Massage Gun                          Neuro Re-Ed             Scapular Retractions 20x 20x 20x 20x 20x 20x       Bicep Curls 15x AAROM 15x AAROM 15x AAROM 15x AAROM 20x 20x       Cane AAROM 15x flex/abd/ER 15x flex/abd/ER 15x flex/abd/ER 15x flex/abd/ER 15x flex/abd/ER 2x10 flex/abd/ER       Shoulder AROM   Supine 15x ea flex/abd AAROM Supine 2x10 ea flex/abd, 15x flex AAROM Supine 2x10 ea flex/abd, 15x flex AAROM Supine 2x10 ea flex/abd, 15x flex AAROM       GH Isos 5x 5\" hold 5x " "5\" hold           Towel Squeezes             Pendulums 10x ea CW/CCW 20x ea CW/CCW 20x ea CW/CCW 20x ea CW/CCW 20x ea CW/CCW 20x ea CW/CCW                    Ther Ex             Pulleys 5' 5' 7' 7' 7' 7'       Finger Ladder    10x flex/abd 10x flex/abd 10x flex/abd       Table Slides 15x flex/abd with ball 15x flex/abd with ball 15x flex/abd with ball 20x flex/abd with ball 20x flex/abd with ball        UT Stretch 30\" 3x ea 30\" 3x ea 30\" 3x ea HEP                                                                          Ther Activity                                       Gait Training                                       Modalities                                            "

## 2024-04-08 ENCOUNTER — OFFICE VISIT (OUTPATIENT)
Dept: PHYSICAL THERAPY | Facility: MEDICAL CENTER | Age: 69
End: 2024-04-08
Attending: PHYSICIAN ASSISTANT
Payer: MEDICARE

## 2024-04-08 DIAGNOSIS — Z96.611 STATUS POST TOTAL SHOULDER ARTHROPLASTY, RIGHT: Primary | ICD-10-CM

## 2024-04-08 PROCEDURE — 97140 MANUAL THERAPY 1/> REGIONS: CPT

## 2024-04-08 PROCEDURE — 97112 NEUROMUSCULAR REEDUCATION: CPT

## 2024-04-08 NOTE — PROGRESS NOTES
Daily Note     Today's date: 2024  Patient name: Magali Stewart  : 1955  MRN: [id number]  Referring provider: Rivas Conner PA-C  Dx:   Encounter Diagnosis     ICD-10-CM    1. Status post total shoulder arthroplasty, right  Z96.611         Eval/Re-Eval POC Expires Auth #/ Referral # Total Visits Start Date Expiration Date Extension Info Visits Limitation   3/13  Medicare - no auth  BOMN        BOMN                                                  1 2 3 4 5 6   3/13 3/18 3/20 3/25 3/27 4/1   7 8 9 10 11 12   4/3 4/8       13 14 15 16 17 18           19 20 21 22 23 24           25 26 27 28 29 30             Start Time: 1607  Stop Time: 1630  Total time in clinic (min): 23 minutes    Subjective: Patient stated that her shoulder doesn't have much pain. Patient arrived 15 minutes late      Objective: See treatment diary below      Assessment: Patient arrived 15 minutes late. Tolerated treatment well.  Patient continues to have deficits in ROM and strength. Patient with improvements in supine AROM today. Patient demonstrated fatigue post treatment, exhibited good technique with therapeutic exercises, and would benefit from continued PT      Plan: Continue per plan of care.  Progress treament per protocol.      Precautions: standard precaution,   Past Medical History:   Diagnosis Date    Allergic 1970    Allergic rhinitis due to pollen 2020    Arthritis     hands    Asthma     Obesity     Sleep apnea     Uses CPAP         PT 1:1 entire time  Manuals 3/18 3/20 3/25 3/27 4/1 4/3 4/8      PROM R Shoulder IB IB IB IB IB IB IB      Jt Mobes             STM/Massage Gun                          Neuro Re-Ed             Scapular Retractions 20x 20x 20x 20x 20x 20x 20x      Bicep Curls 15x AAROM 15x AAROM 15x AAROM 15x AAROM 20x 20x 20x      Cane AAROM 15x flex/abd/ER 15x flex/abd/ER 15x flex/abd/ER 15x flex/abd/ER 15x flex/abd/ER 2x10 flex/abd/ER 20x flex/abd/ER      Shoulder AROM   Supine 15x ea flex/abd AAROM  "Supine 2x10 ea flex/abd, 15x flex AAROM Supine 2x10 ea flex/abd, 15x flex AAROM Supine 2x10 ea flex/abd, 15x flex AAROM Supine 2x10 ea flex/abd, 20x flex AAROM      GH Isos 5x 5\" hold 5x 5\" hold           Towel Squeezes             Pendulums 10x ea CW/CCW 20x ea CW/CCW 20x ea CW/CCW 20x ea CW/CCW 20x ea CW/CCW 20x ea CW/CCW 20x ea CW/CCW                   Ther Ex             Pulleys 5' 5' 7' 7' 7' 7' 5'      Finger Ladder    10x flex/abd 10x flex/abd 10x flex/abd 10x flex/abd      Table Slides 15x flex/abd with ball 15x flex/abd with ball 15x flex/abd with ball 20x flex/abd with ball 20x flex/abd with ball        UT Stretch 30\" 3x ea 30\" 3x ea 30\" 3x ea HEP                                                                          Ther Activity                                       Gait Training                                       Modalities                                            "

## 2024-04-10 ENCOUNTER — OFFICE VISIT (OUTPATIENT)
Dept: PHYSICAL THERAPY | Facility: MEDICAL CENTER | Age: 69
End: 2024-04-10
Attending: PHYSICIAN ASSISTANT
Payer: MEDICARE

## 2024-04-10 DIAGNOSIS — Z96.611 STATUS POST TOTAL SHOULDER ARTHROPLASTY, RIGHT: Primary | ICD-10-CM

## 2024-04-10 PROCEDURE — 97140 MANUAL THERAPY 1/> REGIONS: CPT

## 2024-04-10 PROCEDURE — 97112 NEUROMUSCULAR REEDUCATION: CPT

## 2024-04-10 PROCEDURE — 97110 THERAPEUTIC EXERCISES: CPT

## 2024-04-10 NOTE — PROGRESS NOTES
Daily Note     Today's date: 4/10/2024  Patient name: Magali Stewart  : 1955  MRN: [id number]  Referring provider: Rivas Conner PA-C  Dx:   Encounter Diagnosis     ICD-10-CM    1. Status post total shoulder arthroplasty, right  Z96.611         Eval/Re-Eval POC Expires Auth #/ Referral # Total Visits Start Date Expiration Date Extension Info Visits Limitation   3/13  Medicare - no auth  BOMN        BOMN                                                  1 2 3 4 5 6   3/13 3/18 3/20 3/25 3/27 4/1   7 8 9 10 11 12   4/3 4/8 4/10      13 14 15 16 17 18           19 20 21 22 23 24           25 26 27 28 29 30             Start Time: 1545  Stop Time: 1628  Total time in clinic (min): 43 minutes    Subjective: Patient stated that her shoulder feels pretty good. She stated that she has been practicing raising her arm.      Objective: See treatment diary below      Assessment: Tolerated treatment well.  Patient continues to demonstrate deficits in ROM and strength. Patient demonstrated fatigue post treatment, exhibited good technique with therapeutic exercises, and would benefit from continued PT      Plan: Continue per plan of care.  Progress treament per protocol.      Precautions: standard precaution,   Past Medical History:   Diagnosis Date    Allergic 1970    Allergic rhinitis due to pollen 2020    Arthritis     hands    Asthma     Obesity     Sleep apnea     Uses CPAP         PT 1:1 entire time  Manuals 3/18 3/20 3/25 3/27 4/1 4/3 4/8 4/10     PROM R Shoulder IB IB IB IB IB IB IB IB     Jt Mobes             STM/Massage Gun                          Neuro Re-Ed             Scapular Retractions 20x 20x 20x 20x 20x 20x 20x 20x     Bicep Curls 15x AAROM 15x AAROM 15x AAROM 15x AAROM 20x 20x 20x 20x     Cane AAROM 15x flex/abd/ER 15x flex/abd/ER 15x flex/abd/ER 15x flex/abd/ER 15x flex/abd/ER 2x10 flex/abd/ER 20x flex/abd/ER 20x flex/abd     Shoulder AROM   Supine 15x ea flex/abd AAROM Supine 2x10 ea flex/abd,  "15x flex AAROM Supine 2x10 ea flex/abd, 15x flex AAROM Supine 2x10 ea flex/abd, 15x flex AAROM Supine 2x10 ea flex/abd, 20x flex AAROM Supine 2x10 ea flex/abd, 20x flex AAROM     GH Isos 5x 5\" hold 5x 5\" hold           Towel Squeezes             Pendulums 10x ea CW/CCW 20x ea CW/CCW 20x ea CW/CCW 20x ea CW/CCW 20x ea CW/CCW 20x ea CW/CCW 20x ea CW/CCW 20x ea CW/CCW                  Ther Ex             Pulleys 5' 5' 7' 7' 7' 7' 5' 7'     Finger Ladder    10x flex/abd 10x flex/abd 10x flex/abd 10x flex/abd 15x flex/abd     Table Slides 15x flex/abd with ball 15x flex/abd with ball 15x flex/abd with ball 20x flex/abd with ball 20x flex/abd with ball        UT Stretch 30\" 3x ea 30\" 3x ea 30\" 3x ea HEP                                                                          Ther Activity                                       Gait Training                                       Modalities                                            "

## 2024-04-15 ENCOUNTER — OFFICE VISIT (OUTPATIENT)
Dept: OBGYN CLINIC | Facility: MEDICAL CENTER | Age: 69
End: 2024-04-15

## 2024-04-15 ENCOUNTER — APPOINTMENT (OUTPATIENT)
Dept: LAB | Facility: MEDICAL CENTER | Age: 69
End: 2024-04-15
Payer: MEDICARE

## 2024-04-15 ENCOUNTER — OFFICE VISIT (OUTPATIENT)
Dept: PHYSICAL THERAPY | Facility: MEDICAL CENTER | Age: 69
End: 2024-04-15
Attending: PHYSICIAN ASSISTANT
Payer: MEDICARE

## 2024-04-15 ENCOUNTER — APPOINTMENT (OUTPATIENT)
Dept: RADIOLOGY | Facility: MEDICAL CENTER | Age: 69
End: 2024-04-15
Payer: MEDICARE

## 2024-04-15 VITALS
HEART RATE: 50 BPM | WEIGHT: 162 LBS | HEIGHT: 63 IN | SYSTOLIC BLOOD PRESSURE: 160 MMHG | BODY MASS INDEX: 28.7 KG/M2 | DIASTOLIC BLOOD PRESSURE: 76 MMHG

## 2024-04-15 DIAGNOSIS — Z96.611 STATUS POST TOTAL SHOULDER ARTHROPLASTY, RIGHT: Primary | ICD-10-CM

## 2024-04-15 DIAGNOSIS — E78.2 MIXED HYPERLIPIDEMIA: ICD-10-CM

## 2024-04-15 DIAGNOSIS — Z96.611 STATUS POST TOTAL SHOULDER ARTHROPLASTY, RIGHT: ICD-10-CM

## 2024-04-15 LAB
ALBUMIN SERPL BCP-MCNC: 4 G/DL (ref 3.5–5)
ALP SERPL-CCNC: 149 U/L (ref 34–104)
ALT SERPL W P-5'-P-CCNC: 33 U/L (ref 7–52)
ANION GAP SERPL CALCULATED.3IONS-SCNC: 11 MMOL/L (ref 4–13)
AST SERPL W P-5'-P-CCNC: 20 U/L (ref 13–39)
BILIRUB SERPL-MCNC: 0.31 MG/DL (ref 0.2–1)
BUN SERPL-MCNC: 16 MG/DL (ref 5–25)
CALCIUM SERPL-MCNC: 9.2 MG/DL (ref 8.4–10.2)
CHLORIDE SERPL-SCNC: 101 MMOL/L (ref 96–108)
CHOLEST SERPL-MCNC: 187 MG/DL
CO2 SERPL-SCNC: 27 MMOL/L (ref 21–32)
CREAT SERPL-MCNC: 0.59 MG/DL (ref 0.6–1.3)
GFR SERPL CREATININE-BSD FRML MDRD: 93 ML/MIN/1.73SQ M
GLUCOSE P FAST SERPL-MCNC: 76 MG/DL (ref 65–99)
HDLC SERPL-MCNC: 62 MG/DL
LDLC SERPL CALC-MCNC: 107 MG/DL (ref 0–100)
POTASSIUM SERPL-SCNC: 4.4 MMOL/L (ref 3.5–5.3)
PROT SERPL-MCNC: 6.9 G/DL (ref 6.4–8.4)
SODIUM SERPL-SCNC: 139 MMOL/L (ref 135–147)
TRIGL SERPL-MCNC: 90 MG/DL

## 2024-04-15 PROCEDURE — 99024 POSTOP FOLLOW-UP VISIT: CPT | Performed by: ORTHOPAEDIC SURGERY

## 2024-04-15 PROCEDURE — 97112 NEUROMUSCULAR REEDUCATION: CPT

## 2024-04-15 PROCEDURE — 36415 COLL VENOUS BLD VENIPUNCTURE: CPT

## 2024-04-15 PROCEDURE — 97140 MANUAL THERAPY 1/> REGIONS: CPT

## 2024-04-15 PROCEDURE — 73030 X-RAY EXAM OF SHOULDER: CPT

## 2024-04-15 PROCEDURE — 80053 COMPREHEN METABOLIC PANEL: CPT

## 2024-04-15 PROCEDURE — 97110 THERAPEUTIC EXERCISES: CPT

## 2024-04-15 PROCEDURE — 80061 LIPID PANEL: CPT

## 2024-04-15 NOTE — PROGRESS NOTES
PT Re-Evaluation     Today's date: 4/15/2024  Patient name: Magali Stewart  : 1955  MRN: [id number]  Referring provider: Rivas Conner PA-C  Dx:   Encounter Diagnosis     ICD-10-CM    1. Status post total shoulder arthroplasty, right  Z96.611         Eval/Re-Eval POC Expires Auth #/ Referral # Total Visits Start Date Expiration Date Extension Info Visits Limitation   3/13  Medicare - no auth  BOMN        BOMN                                                  1 2 3 4 5 6   3/13 3/18 3/20 3/25 3/27 4/1   7 8 9 10 11 12   4/3 4/8 4/10 4/15     13 14 15 16 17 18           19 20 21 22 23 24           25 26 27 28 29 30               Start Time: 1145  Stop Time: 1230  Total time in clinic (min): 45 minutes    Assessment  Assessment details: IE:  Magali Stewart is a 68 y.o. female who presents to PT with a referral from Dr. Conner for a medical diagnosis of Status post total shoulder arthroplasty, right  (primary encounter diagnosis). Patient presents to PT with limitations in ROM, strength, functional mobility, and postural stability secondary to pain, decreased muscular endurance, decreased neuromuscular control, and MD protocol. Strength and AROM not tested today per protocol. Patient with deficits in PROM secondary to pain, tolerated PROM well. Patient noted some tenderness during palpation over anterior deltoid, biceps, bicipital groove, and biceps tendon. Patient able to complete HEP without increase pain. Patient's key impairments include: decreased ROM, decreased strength, decreased endurance, pain with functional activities, and poor body mechanics. The limitations listed above affect patient's ability to reach overhead, reach behind back, reach, lift/carry, push, pull, shower, get dressed, drive, exercise, and grasp, perform recreational activities and ADLs and decrease patient's quality of life. Patient to benefit from skilled PT to address these limitations,  increase ROM, improve strength, reduce pain,  improve activity tolerance, and improve quality of life     4/15/2024  Patient has completed 10 PT sessions to this date.  The patient has made improvements in shoulder ROM and strength, however continues to have deficits in strength against gravity. Patient still demonstrates significant deficits in strength with AROM against gravity, therefore affecting her ability to complete ADLs and perform recreational activities. Patient was educated about shoulder progress thus far and how continued ROM and strengthening will further improve remaining limitations. HEP progressed and updated to reflect current patient function. Patient was educated about safety with exercises and educated on performing correctly and safely.  All other questions and concerns were addressed.  I believe this patient will continue to benefit from skilled PT to address remaining deficits in shoulder ROM and strength and improve activity tolerance and quality of life.    Impairments: abnormal muscle firing, abnormal muscle tone, abnormal or restricted ROM, activity intolerance, impaired physical strength, lacks appropriate home exercise program, pain with function, weight-bearing intolerance, poor posture  and poor body mechanics    Symptom irritability: lowUnderstanding of Dx/Px/POC: good   Prognosis: good    Goals  STG (2-4 Weeks)  Patient will have an increase in R shoulder flexion PROM to 90 degrees in 4 weeks to promote increased mobility in 4 weeks. MET  Patient will have a decrease in pain at worst by 2 points on the NPRS to improve quality of life in 4 weeks. MET  Patient will be efficient and compliant with comprehensive HEP in 4 weeks. MET    LTG (4-8 Weeks)  Patient will have a FOTO of anticipated or greater by discharge. PROGRESSING  Patient will be able to reach to shoulder height without pain to help with getting items off shelves by discharge. PROGRESSING  Patient will be able to get dressed without pain to improve quality of life by  discharge. MET    Plan  Patient would benefit from: skilled physical therapy  Planned modality interventions: TENS, thermotherapy: hydrocollator packs, cryotherapy, electrical stimulation/Russian stimulation, traction and unattended electrical stimulation  Planned therapy interventions: abdominal trunk stabilization, IASTM, joint mobilization, activity modification, kinesiology taping, ADL training, manual therapy, massage, Yousif taping, balance, balance/weight bearing training, motor coordination training, behavior modification, muscle pump exercises, body mechanics training, nerve gliding, neuromuscular re-education, breathing training, patient education, postural training, coordination, self care, transfer training, therapeutic training, therapeutic exercise, therapeutic activities, stretching, strengthening, fine motor coordination training, flexibility, functional ROM exercises, gait training, graded activity, graded exercise, graded motor, home exercise program, IADL retraining and work reintegration  Frequency: 2x week  Duration in weeks: 12  Plan of Care beginning date: 3/13/2024  Plan of Care expiration date: 6/5/2024  Treatment plan discussed with: patient        Subjective Evaluation    History of Present Illness  Date of surgery: 2/28/2024.  Mechanism of injury: surgery  Mechanism of injury: IE:  Patient reports to PT s/p R TSA that occurred on 2/28/2024. Patient rated their current pain 1/10, at its worst 10/10, and at its best 0/10 on the NPRS. Patient described the pain as dull and achy and stated that it lasts less than 15 minutes. Patient stated that ice, OTC NSAIDS, and rest makes it better, and reaching, reaching overhead, reaching behind back, carrying, pushing, pulling, lifting, and driving makes it worse. Patient reported that the pain is worse depending on if she accidentally moves it around and denies waking them at night. Patient denies changes to  bowel and bladder, chest pain, night  pain, or fever. Patient reports no prior treatment for CC. Patient's goals of PT are to decrease pain, improve ROM, and increase strength     4/15/2024  Patient stated that her pain has been very well managed. She stated that she still has weakness with lifting her arm but still has been working on using it more. She stated that she saw her doctor today and he wanted to work on strengthening.   Quality of life: good    Patient Goals  Patient goals for therapy: decreased pain, increased motion and increased strength    Pain  Current pain ratin  At best pain ratin  At worst pain ratin  Quality: dull ache  Relieving factors: ice, relaxation, rest, support and medications  Aggravating factors: overhead activity and lifting  Progression: no change          Objective     Postural Observations  Seated posture: fair  Standing posture: fair      Observations     Right Shoulder  Positive for incision.     Palpation   Left   No palpable tenderness to the biceps.     Right   No palpable tenderness to the infraspinatus, latissimus, middle deltoid, middle trapezius, posterior deltoid, rhomboids, subscapularis, supraspinatus, triceps and upper trapezius.   Tenderness of the anterior deltoid, biceps and pectoralis major.     Tenderness     Right Shoulder  Tenderness in the biceps tendon (proximal) and bicipital groove.     Neurological Testing     Sensation     Shoulder   Left Shoulder   Intact: light touch    Right Shoulder   Intact: Light touch    Active Range of Motion     Right Shoulder   Flexion: 128 degrees with pain  Abduction: 90 degrees   External rotation 0°: 5 degrees with pain    Passive Range of Motion     Right Shoulder   Flexion: 144 degrees with pain  Abduction: 98 degrees with pain  External rotation 0°: 35 degrees with pain    Strength/Myotome Testing     Right Shoulder     Planes of Motion   Flexion: 2   Extension: 2   Abduction: 2   External rotation at 0°: 2   Internal rotation at 0°: 2  "                 Precautions: standard precaution,   Past Medical History:   Diagnosis Date    Allergic 1970    Allergic rhinitis due to pollen 09/18/2020    Arthritis     hands    Asthma     Obesity     Sleep apnea     Uses CPAP       PT 1:1 entire time  Manuals 3/18 3/20 3/25 3/27 4/1 4/3 4/8 4/10 4/15    PROM R Shoulder IB IB IB IB IB IB IB IB IB    Jt Mobes             STM/Massage Gun                          Neuro Re-Ed             Scapular Retractions 20x 20x 20x 20x 20x 20x 20x 20x 20x    Reverse Shoulder Shrugs         20x    Bicep Curls 15x AAROM 15x AAROM 15x AAROM 15x AAROM 20x 20x 20x 20x 20x YTB    Cane AAROM 15x flex/abd/ER 15x flex/abd/ER 15x flex/abd/ER 15x flex/abd/ER 15x flex/abd/ER 2x10 flex/abd/ER 20x flex/abd/ER 20x flex/abd 15x flex/abd    Shoulder AROM   Supine 15x ea flex/abd AAROM Supine 2x10 ea flex/abd, 15x flex AAROM Supine 2x10 ea flex/abd, 15x flex AAROM Supine 2x10 ea flex/abd, 15x flex AAROM Supine 2x10 ea flex/abd, 20x flex AAROM Supine 2x10 ea flex/abd, 20x flex AAROM Supine flex 20x 1#, sidelying abd,20x 1#, ER, 15x    GH Isos 5x 5\" hold 5x 5\" hold           Towel Squeezes             Pendulums 10x ea CW/CCW 20x ea CW/CCW 20x ea CW/CCW 20x ea CW/CCW 20x ea CW/CCW 20x ea CW/CCW 20x ea CW/CCW 20x ea CW/CCW HEP    TB GH Row         20x RTB    TB GH Ext         20x RTB    TB IR/ER         15x ea YTB    Ther Ex             Pulleys 5' 5' 7' 7' 7' 7' 5' 7' 7'    Finger Ladder    10x flex/abd 10x flex/abd 10x flex/abd 10x flex/abd 15x flex/abd 15x flex/abd    Table Slides 15x flex/abd with ball 15x flex/abd with ball 15x flex/abd with ball 20x flex/abd with ball 20x flex/abd with ball        UT Stretch 30\" 3x ea 30\" 3x ea 30\" 3x ea HEP                                                                          Ther Activity                                       Gait Training                                       Modalities                                              "

## 2024-04-15 NOTE — PROGRESS NOTES
[facility] [place]  [address]  [place] [address]       Magali Stewart  [id number]  1955    ORTHOPAEDIC SURGERY OUTPATIENT NOTE  4/15/2024      HISTORY:  69 y.o. female presents 6-week follow-up status post right shoulder anatomic total shoulder arthroplasty and biceps tenodesis from 1/2/2024.  She is doing very well.  SANE score 50%.  She is 0 pain at rest.  She is been attending physical therapy.    Past Medical History:   Diagnosis Date    Allergic 1970    Allergic rhinitis due to pollen 09/18/2020    Arthritis     hands    Asthma     Obesity     Sleep apnea     Uses CPAP       Past Surgical History:   Procedure Laterality Date    JOINT REPLACEMENT      KNEE SURGERY Bilateral     REPLACEMENT    MT ARTHROPLASTY GLENOHUMERAL JOINT TOTAL SHOULDER Right 2/28/2024    Procedure: ARTHROPLASTY SHOULDER- right anatomic,Biceps tenodesis.;  Surgeon: Neeru Lovell DO;  Location:  MAIN OR;  Service: Orthopedics    TONSILLECTOMY      TUBAL LIGATION         Social History     Socioeconomic History    Marital status: /Civil Union     Spouse name: Not on file    Number of children: Not on file    Years of education: Not on file    Highest education level: Not on file   Occupational History    Occupation: RETIRED   Tobacco Use    Smoking status: Former     Current packs/day: 0.00     Average packs/day: 1 pack/day for 30.3 years (30.3 ttl pk-yrs)     Types: Cigarettes     Start date: 6/6/1978     Quit date: 9/18/2008     Years since quitting: 15.5    Smokeless tobacco: Never   Vaping Use    Vaping status: Never Used   Substance and Sexual Activity    Alcohol use: Yes     Alcohol/week: 2.0 standard drinks of alcohol     Types: 1 Cans of beer, 1 Standard drinks or equivalent per week    Drug use: Never    Sexual activity: Yes     Partners: Male     Birth control/protection: Surgical   Other Topics Concern    Not on file   Social History Narrative    · Do  "you currently or have you served in the [employer]:   No      · Were you activated, into active duty, as a member of the [employer] or as a Reservist:   No      · Exercise level:   None      · Diet:   Regular      · General stress level:   Low      · ·Caffeine intake:   Moderate      · Chewing tobacco:   none      · Seat belts used routinely:   Yes      · Sunscreen used routinely:   No      · Smoke alarm in home:   Yes      ·      Social Determinants of Health     Financial Resource Strain: Low Risk  (10/20/2023)    Overall Financial Resource Strain (CARDIA)     Difficulty of Paying Living Expenses: Not very hard   Food Insecurity: Not on file   Transportation Needs: No Transportation Needs (10/20/2023)    PRAPARE - Transportation     Lack of Transportation (Medical): No     Lack of Transportation (Non-Medical): No   Physical Activity: Not on file   Stress: Not on file   Social Connections: Not on file   Intimate Partner Violence: Not on file   Housing Stability: Not on file       Family History   Problem Relation Age of Onset    Other Mother         maintenance procedure for cardiac pacemaker system     Breast cancer Maternal Grandmother     Breast cancer Paternal Grandmother         Patient's Medications   New Prescriptions    No medications on file   Previous Medications    ELASTIC BANDAGES & SUPPORTS (SPLINT WRIST BRACE/LEFT-RIGHT) MISC    Use daily at bedtime    EZETIMIBE (ZETIA) 10 MG TABLET    TAKE 1 TABLET DAILY    MOMETASONE (NASONEX) 50 MCG/ACT NASAL SPRAY    USE 1 SPRAY IN EACH NOSTRIL TWICE A DAY    PHENTERMINE (ADIPEX-P) 37.5 MG TABLET    TAKE ONE-HALF (1/2) TABLET DAILY   Modified Medications    No medications on file   Discontinued Medications    No medications on file       No Known Allergies     /76   Pulse (!) 50   Ht 5' 3\" (1.6 m)   Wt 73.5 kg (162 lb)   BMI 28.70 kg/m²      REVIEW OF SYSTEMS:  Constitutional: Negative.    HEENT: Negative.    Respiratory: Negative.    Skin: " "Negative.    Neurological: Negative.    Psychiatric/Behavioral: Negative.  Musculoskeletal: Negative except for that mentioned in the HPI.    /76   Pulse (!) 50   Ht 5' 3\" (1.6 m)   Wt 73.5 kg (162 lb)   BMI 28.70 kg/m²   Gen: No acute distress, resting comfortably in bed  HEENT: Eyes clear, moist mucus membranes, hearing intact  Respiratory: No audible wheezing or stridor  Cardiovascular: Well Perfused peripherally, 2+ distal pulse  Abdomen: nondistended, no peritoneal signs     PHYSICAL EXAM:    Right Shoulder  Incision well healed    ROM:  FF 90 active, passive 130  Abduction 70  ER 30  IR L5    Strength  FF 4/5  ER 5/5  IR 4/5    Sensation intact  Brisk CR    IMAGING: 3 of the right shoulder was taken today in the office.  This was reviewed in PACS and demonstrates intact anatomic total shoulder arthroplasty in stable positioning without signs of loosening or failure.    ASSESSMENT AND PLAN:  69 y.o. female 6 weeks status post right total shoulder arthroplasty.  She is doing very well.  She will do physical therapy.  Will see her back in 6 weeks for repeat exam with x-ray.  Axillary view on the next x-ray.    Scribe Attestation      I,:  Rivas Conner PA-C am acting as a scribe while in the presence of the attending physician.:       I,:  Neeru Lovell personally performed the services described in this documentation    as scribed in my presence.:             "

## 2024-04-17 ENCOUNTER — OFFICE VISIT (OUTPATIENT)
Dept: PHYSICAL THERAPY | Facility: MEDICAL CENTER | Age: 69
End: 2024-04-17
Attending: PHYSICIAN ASSISTANT
Payer: MEDICARE

## 2024-04-17 DIAGNOSIS — Z96.611 STATUS POST TOTAL SHOULDER ARTHROPLASTY, RIGHT: Primary | ICD-10-CM

## 2024-04-17 PROCEDURE — 97140 MANUAL THERAPY 1/> REGIONS: CPT

## 2024-04-17 PROCEDURE — 97112 NEUROMUSCULAR REEDUCATION: CPT

## 2024-04-17 PROCEDURE — 97110 THERAPEUTIC EXERCISES: CPT

## 2024-04-17 NOTE — PROGRESS NOTES
Daily Note     Today's date: 2024  Patient name: Magali Stewart  : 1955  MRN: [id number]  Referring provider: Rivas Conner PA-C  Dx:   Encounter Diagnosis     ICD-10-CM    1. Status post total shoulder arthroplasty, right  Z96.611           Eval/Re-Eval POC Expires Auth #/ Referral # Total Visits Start Date Expiration Date Extension Info Visits Limitation   3/13  Medicare - no auth  BOMN        BOMN                                                  1 2 3 4 5 6   3/13 3/18 3/20 3/25 3/27 4/1   7 8 9 10 11 12   4/3 4/8 4/10 4/17     13 14 15 16 17 18           19 20 21 22 23 24           25 26 27 28 29 30             Start Time: 1542  Stop Time: 1630  Total time in clinic (min): 48 minutes    Subjective: Patient stated that her shoulder is a little sore today.      Objective: See treatment diary below      Assessment: Tolerated treatment well.  Patient able to progress with exercises as tolerated without increase in pain. Patient continues to have deficits in ER ROM and strength during exercises.. Patient demonstrated fatigue post treatment, exhibited good technique with therapeutic exercises, and would benefit from continued PT      Plan: Continue per plan of care.  Progress treament per protocol.      Precautions: standard precaution,   Past Medical History:   Diagnosis Date     1970    Allergic rhinitis due to pollen 2020    Arthritis     hands    Asthma     Obesity     Sleep apnea     Uses CPAP         PT 1:1 7677-2350  Manuals 3/18 3/20 3/25 3/27 4/1 4/3 4/8 4/10 4/15 4/17   PROM R Shoulder IB IB IB IB IB IB IB IB IB IB   Jt Mobes             STM/Massage Gun                          Neuro Re-Ed             Scapular Retractions 20x 20x 20x 20x 20x 20x 20x 20x 20x 20x   Reverse Shoulder Shrugs         20x    Bicep Curls 15x AAROM 15x AAROM 15x AAROM 15x AAROM 20x 20x 20x 20x 20x YTB 20x RTB   Cane AAROM 15x flex/abd/ER 15x flex/abd/ER 15x flex/abd/ER 15x flex/abd/ER 15x flex/abd/ER 2x10  "flex/abd/ER 20x flex/abd/ER 20x flex/abd 15x flex/abd 15x flex/abd   Shoulder AROM   Supine 15x ea flex/abd AAROM Supine 2x10 ea flex/abd, 15x flex AAROM Supine 2x10 ea flex/abd, 15x flex AAROM Supine 2x10 ea flex/abd, 15x flex AAROM Supine 2x10 ea flex/abd, 20x flex AAROM Supine 2x10 ea flex/abd, 20x flex AAROM Supine flex 20x 1#, sidelying abd,20x 1#, ER, 15x Supine flex 20x 1#, sidelying abd,20x 1#, ER, 15x, supine ER 15x   GH Isos 5x 5\" hold 5x 5\" hold           Towel Squeezes             Pendulums 10x ea CW/CCW 20x ea CW/CCW 20x ea CW/CCW 20x ea CW/CCW 20x ea CW/CCW 20x ea CW/CCW 20x ea CW/CCW 20x ea CW/CCW HEP    TB GH Row         20x RTB 20x GTB   TB GH Ext         20x RTB 20x GTB   TB IR/ER         15x ea YTB 15x IR, 15x ER RTB   Ther Ex             Pulleys 5' 5' 7' 7' 7' 7' 5' 7' 7' 7'   Finger Ladder    10x flex/abd 10x flex/abd 10x flex/abd 10x flex/abd 15x flex/abd 15x flex/abd 10x flex/abd   Table Slides 15x flex/abd with ball 15x flex/abd with ball 15x flex/abd with ball 20x flex/abd with ball 20x flex/abd with ball        UT Stretch 30\" 3x ea 30\" 3x ea 30\" 3x ea HEP                                                                          Ther Activity                                       Gait Training                                       Modalities                                         "

## 2024-04-19 ENCOUNTER — RA CDI HCC (OUTPATIENT)
Dept: OTHER | Facility: HOSPITAL | Age: 69
End: 2024-04-19

## 2024-04-22 ENCOUNTER — OFFICE VISIT (OUTPATIENT)
Dept: PHYSICAL THERAPY | Facility: MEDICAL CENTER | Age: 69
End: 2024-04-22
Attending: PHYSICIAN ASSISTANT
Payer: MEDICARE

## 2024-04-22 DIAGNOSIS — Z96.611 STATUS POST TOTAL SHOULDER ARTHROPLASTY, RIGHT: Primary | ICD-10-CM

## 2024-04-22 PROCEDURE — 97110 THERAPEUTIC EXERCISES: CPT

## 2024-04-22 PROCEDURE — 97112 NEUROMUSCULAR REEDUCATION: CPT

## 2024-04-22 NOTE — PROGRESS NOTES
Daily Note     Today's date: 2024  Patient name: Magali Stewart  : 1955  MRN: [id number]  Referring provider: Rivas Conner PA-C  Dx:   Encounter Diagnosis     ICD-10-CM    1. Status post total shoulder arthroplasty, right  Z96.611             Eval/Re-Eval POC Expires Auth #/ Referral # Total Visits Start Date Expiration Date Extension Info Visits Limitation   3/13  Medicare - no auth  BOMN        BOMN                                                  1 2 3 4 5 6   3/13 3/18 3/20 3/25 3/27 4/1   7 8 9 10 11 12   4/3 4/8 4/10 4/17 4/22    13 14 15 16 17 18           19 20 21 22 23 24           25 26 27 28 29 30             Start Time: 1115  Stop Time: 1200  Total time in clinic (min): 45 minutes    Subjective: Patient stated that her shoulder feels stiff today.      Objective: See treatment diary below      Assessment: Tolerated treatment well.  Patient continues to progress with treatment as tolerated. Patient continues to have deficits in global strength ROM.  Patient demonstrated fatigue post treatment, exhibited good technique with therapeutic exercises, and would benefit from continued PT      Plan: Continue per plan of care.  Progress treament per protocol.      Precautions: standard precaution,   Past Medical History:   Diagnosis Date     1970    Allergic rhinitis due to pollen 2020    Arthritis     hands    Asthma     Obesity     Sleep apnea     Uses CPAP         PT 1:1 5662-2170  Manuals 4/22       4/10 4/15 4/17   PROM R Shoulder        IB IB IB   Jt Mobes             STM/Massage Gun                          Neuro Re-Ed             Scapular Retractions 20x       20x 20x 20x   Reverse Shoulder Shrugs 20x        20x    Bicep Curls 20x RTB       20x 20x YTB 20x RTB   Cane AAROM 20x flex/abd       20x flex/abd 15x flex/abd 15x flex/abd   Shoulder AROM Supine flex 20x 1#, sidelying abd,20x 1#, ER, 15x, supine ER 15x       Supine 2x10 ea flex/abd, 20x flex AAROM Supine flex 20x 1#,  sidelying abd,20x 1#, ER, 15x Supine flex 20x 1#, sidelying abd,20x 1#, ER, 15x, supine ER 15x   GH Isos             Towel Squeezes             Pendulums        20x ea CW/CCW HEP    TB GH Row 20x GTB        20x RTB 20x GTB   TB GH Ext 20x GTB        20x RTB 20x GTB   TB IR/ER 15x ea RTB        15x ea YTB 15x IR, 15x ER RTB   Ther Ex             Pulleys 7'       7' 7' 7'   Finger Ladder 10x flex/abd       15x flex/abd 15x flex/abd 10x flex/abd   Table Slides             UT Stretch                                                                              Ther Activity                                       Gait Training                                       Modalities

## 2024-04-24 ENCOUNTER — OFFICE VISIT (OUTPATIENT)
Dept: PHYSICAL THERAPY | Facility: MEDICAL CENTER | Age: 69
End: 2024-04-24
Attending: PHYSICIAN ASSISTANT
Payer: MEDICARE

## 2024-04-24 DIAGNOSIS — Z96.611 STATUS POST TOTAL SHOULDER ARTHROPLASTY, RIGHT: Primary | ICD-10-CM

## 2024-04-24 PROCEDURE — 97112 NEUROMUSCULAR REEDUCATION: CPT

## 2024-04-24 PROCEDURE — 97110 THERAPEUTIC EXERCISES: CPT

## 2024-04-24 PROCEDURE — 97140 MANUAL THERAPY 1/> REGIONS: CPT

## 2024-04-24 NOTE — PROGRESS NOTES
Daily Note     Today's date: 2024  Patient name: Magali Stewart  : 1955  MRN: [id number]  Referring provider: Rivas Conner PA-C  Dx:   Encounter Diagnosis     ICD-10-CM    1. Status post total shoulder arthroplasty, right  Z96.611             Eval/Re-Eval POC Expires Auth #/ Referral # Total Visits Start Date Expiration Date Extension Info Visits Limitation   3/13  Medicare - no auth  BOMN        BOMN                                                  1 2 3 4 5 6   3/13 3/18 3/20 3/25 3/27 4/1   7 8 9 10 11 12   4/3 4/8 4/10 4/17 4/22 4/24   13 14 15 16 17 18           19 20 21 22 23 24           25 26 27 28 29 30             Start Time: 1628  Stop Time: 1721  Total time in clinic (min): 53 minutes    Subjective: Patient stated that her shoulder doesn't feel too bad today.      Objective: See treatment diary below      Assessment: Tolerated treatment well.  Patient able to increase resistance as tolerated on exercises. Patient with difficulty with shoulder ER. Patient noted weakness with any ER motion. Patient continues to have some deficits in global shoulder ROM. Patient demonstrated fatigue post treatment, exhibited good technique with therapeutic exercises, and would benefit from continued PT      Plan: Continue per plan of care.  Progress treament per protocol.      Precautions: standard precaution,   Past Medical History:   Diagnosis Date    Allergic 1970    Allergic rhinitis due to pollen 2020    Arthritis     hands    Asthma     Obesity     Sleep apnea     Uses CPAP         PT 1:1 entire time  Manuals 4/22 4/24      4/10 4/15 4/17   PROM R Shoulder  IB      IB IB IB   Jt Mobes             STM/Massage Gun                          Neuro Re-Ed             Scapular Retractions 20x 20x      20x 20x 20x   Reverse Shoulder Shrugs 20x 20x       20x    Bicep Curls 20x RTB 20x GTB      20x 20x YTB 20x RTB   Triceps Extensions  20x GTB           Cane AAROM 20x flex/abd 20x flex/abd, 20x flex suine       20x flex/abd 15x flex/abd 15x flex/abd   Shoulder AROM Supine flex 20x 1#, sidelying abd,20x 1#, ER, 15x, supine ER 15x Supine flex/ER 20x, sidelying abd 20x      Supine 2x10 ea flex/abd, 20x flex AAROM Supine flex 20x 1#, sidelying abd,20x 1#, ER, 15x Supine flex 20x 1#, sidelying abd,20x 1#, ER, 15x, supine ER 15x   Pendulums        20x ea CW/CCW HEP    TB GH Row 20x GTB 20x GTB       20x RTB 20x GTB   TB GH Ext 20x GTB 20x GTB       20x RTB 20x GTB   TB IR/ER 15x ea RTB 15x ER GTB, 5x IR YTB       15x ea YTB 15x IR, 15x ER RTB   TB Step Outs  15x ER YTB           Shoulder Raises  15x YTB fwd/lat           Ther Ex             Pulleys 7' 7'      7' 7' 7'   Finger Ladder 10x flex/abd 15x flex/abd      15x flex/abd 15x flex/abd 10x flex/abd   Table Slides             UT Stretch                                                                              Ther Activity                                       Gait Training                                       Modalities

## 2024-04-25 NOTE — ASSESSMENT & PLAN NOTE
Apply direct pressure for any further bleeding. Follow-up with a primary care physician. Return to the ER for any other acute concerns. Patient  is stable with current medication and we discussed a low fat low cholesterol diet. Weight loss also discussed for this will help lower cholesterol also. Recheck lipids in 6 months.

## 2024-04-25 NOTE — PROGRESS NOTES
Name: Magali Stewart      : 1955      MRN: [id number]  Encounter Provider: Wayne Wyatt MD  Encounter Date: 2024   Encounter department: [facility]    Assessment & Plan     1. Allergic rhinitis due to pollen, unspecified seasonality  Assessment & Plan:  Pt is to avoid those substances that precipitate allergic reaction and to use OY+TC antihistamines and/or nasal steroid spray prn.       2. Chronic fatigue  Assessment & Plan:  Patient is stable  and will continue present plan of care and reassess at next routine visit. All questions about this problem from patient were answered today.       3. Mixed hyperlipidemia  Assessment & Plan:  Patient  is stable with current medication and we discussed a low fat low cholesterol diet. Weight loss also discussed for this will help lower cholesterol also. Recheck lipids in 6 months.     Orders:  -     ezetimibe (ZETIA) 10 mg tablet; Take 1 tablet (10 mg total) by mouth daily  -     Lipid Panel with Direct LDL reflex; Future    4. Encounter for screening mammogram for breast cancer  -     Mammo screening bilateral w 3d & cad; Future; Expected date: 2024    5. Postviral fatigue syndrome  -     phentermine (ADIPEX-P) 37.5 MG tablet; Take 0.5 tablets (18.75 mg total) by mouth in the morning    6. Menopause  -     DXA bone density spine hip and pelvis; Future; Expected date: 2024        Falls Plan of Care: balance, strength, and gait training instructions were provided. Home safety education provided.     Urinary Incontinence Plan of Care: counseling topics discussed: practice Kegel (pelvic floor strengthening) exercises, use restroom every 2 hours, limit alcohol, caffeine, spicy foods, and acidic foods, limiting fluid intake 3-4 hours before bed, weight loss, preventing constipation and limiting fluid intake to 60 oz. per day.       Subjective     69-year-old female today for checkup on multimedical problems.  Patient with  allergic rhinitis due to seasonal allergies chronic fatigue syndrome hyperlipidemia as well as degenerative disease of the shoulders.  Patient is s/p replacement of right shoulder and is doing quite well with that.  Patient had lab work done which was reviewed today her cholesterol is doing quite well.  She needs refills on all of her medicines today which was done for her she is also due for a mammogram as well as a DEXA scan and I will also order her cholesterol for her next visit which will be in 6 months.      Review of Systems    Past Medical History:   Diagnosis Date   • Allergic 1970   • Allergic rhinitis due to pollen 09/18/2020   • Arthritis     hands   • Asthma    • Obesity    • Sleep apnea     Uses CPAP     Past Surgical History:   Procedure Laterality Date   • JOINT REPLACEMENT     • KNEE SURGERY Bilateral     REPLACEMENT   • MI ARTHROPLASTY GLENOHUMERAL JOINT TOTAL SHOULDER Right 2/28/2024    Procedure: ARTHROPLASTY SHOULDER- right anatomic,Biceps tenodesis.;  Surgeon: Neeru Lovell DO;  Location:  MAIN OR;  Service: Orthopedics   • TONSILLECTOMY     • TUBAL LIGATION       Family History   Problem Relation Age of Onset   • Other Mother         maintenance procedure for cardiac pacemaker system    • Breast cancer Maternal Grandmother    • Breast cancer Paternal Grandmother      Social History     Socioeconomic History   • Marital status: /Civil Union     Spouse name: None   • Number of children: None   • Years of education: None   • Highest education level: None   Occupational History   • Occupation: RETIRED   Tobacco Use   • Smoking status: Former     Current packs/day: 0.00     Average packs/day: 1 pack/day for 30.3 years (30.3 ttl pk-yrs)     Types: Cigarettes     Start date: 6/6/1978     Quit date: 9/18/2008     Years since quitting: 15.6   • Smokeless tobacco: Never   Vaping Use   • Vaping status: Never Used   Substance and Sexual Activity   • Alcohol use: Yes     Alcohol/week: 2.0 standard  drinks of alcohol     Types: 1 Cans of beer, 1 Standard drinks or equivalent per week   • Drug use: Never   • Sexual activity: Yes     Partners: Male     Birth control/protection: Surgical   Other Topics Concern   • None   Social History Narrative    · Do you currently or have you served in the [place] [employer]:   No      · Were you activated, into active duty, as a member of the [employer] or as a Reservist:   No      · Exercise level:   None      · Diet:   Regular      · General stress level:   Low      · ·Caffeine intake:   Moderate      · Chewing tobacco:   none      · Seat belts used routinely:   Yes      · Sunscreen used routinely:   No      · Smoke alarm in home:   Yes      ·      Social Determinants of Health     Financial Resource Strain: Low Risk  (10/20/2023)    Overall Financial Resource Strain (CARDIA)    • Difficulty of Paying Living Expenses: Not very hard   Food Insecurity: Not on file   Transportation Needs: No Transportation Needs (10/20/2023)    PRAPARE - Transportation    • Lack of Transportation (Medical): No    • Lack of Transportation (Non-Medical): No   Physical Activity: Not on file   Stress: Not on file   Social Connections: Not on file   Intimate Partner Violence: Not on file   Housing Stability: Not on file     Current Outpatient Medications on File Prior to Visit   Medication Sig   • Elastic Bandages & Supports (Splint Wrist Brace/Left-Right) MISC Use daily at bedtime   • mometasone (NASONEX) 50 mcg/act nasal spray USE 1 SPRAY IN EACH NOSTRIL TWICE A DAY   • naproxen (NAPROSYN) 250 mg tablet Take 250 mg by mouth 2 (two) times a day with meals   • [DISCONTINUED] ezetimibe (ZETIA) 10 mg tablet TAKE 1 TABLET DAILY   • [DISCONTINUED] phentermine (ADIPEX-P) 37.5 MG tablet TAKE ONE-HALF (1/2) TABLET DAILY     No Known Allergies  Immunization History   Administered Date(s) Administered   • COVID-19 PFIZER VACCINE 0.3 ML IM 01/06/2022, 01/27/2022, 01/27/2022   • INFLUENZA 12/10/2018,  "10/17/2022, 10/20/2023   • Influenza, high dose seasonal 0.7 mL 10/17/2022, 10/20/2023   • Pneumococcal Conjugate 13-Valent 12/10/2018   • Pneumococcal Conjugate Vaccine 20-valent (Pcv20), Polysace 10/17/2022   • Tdap 07/24/2020       Objective     /78 (BP Location: Left arm, Patient Position: Sitting, Cuff Size: Standard)   Pulse (!) 111   Temp 98.2 °F (36.8 °C)   Resp 18   Ht 5' 3\" (1.6 m)   Wt 75.8 kg (167 lb 3.2 oz)   SpO2 97%   BMI 29.62 kg/m²     Physical Exam  Wayne Wyatt MD    "

## 2024-04-26 ENCOUNTER — OFFICE VISIT (OUTPATIENT)
Dept: FAMILY MEDICINE CLINIC | Facility: CLINIC | Age: 69
End: 2024-04-26
Payer: MEDICARE

## 2024-04-26 VITALS
WEIGHT: 167.2 LBS | TEMPERATURE: 98.2 F | DIASTOLIC BLOOD PRESSURE: 78 MMHG | HEIGHT: 63 IN | RESPIRATION RATE: 18 BRPM | BODY MASS INDEX: 29.62 KG/M2 | OXYGEN SATURATION: 97 % | HEART RATE: 111 BPM | SYSTOLIC BLOOD PRESSURE: 128 MMHG

## 2024-04-26 DIAGNOSIS — R53.82 CHRONIC FATIGUE: ICD-10-CM

## 2024-04-26 DIAGNOSIS — G93.31 POSTVIRAL FATIGUE SYNDROME: ICD-10-CM

## 2024-04-26 DIAGNOSIS — E78.2 MIXED HYPERLIPIDEMIA: ICD-10-CM

## 2024-04-26 DIAGNOSIS — J30.1 ALLERGIC RHINITIS DUE TO POLLEN, UNSPECIFIED SEASONALITY: Primary | ICD-10-CM

## 2024-04-26 DIAGNOSIS — Z78.0 MENOPAUSE: ICD-10-CM

## 2024-04-26 DIAGNOSIS — Z12.31 ENCOUNTER FOR SCREENING MAMMOGRAM FOR BREAST CANCER: ICD-10-CM

## 2024-04-26 PROCEDURE — 99214 OFFICE O/P EST MOD 30 MIN: CPT | Performed by: FAMILY MEDICINE

## 2024-04-26 PROCEDURE — G2211 COMPLEX E/M VISIT ADD ON: HCPCS | Performed by: FAMILY MEDICINE

## 2024-04-26 RX ORDER — NAPROXEN 250 MG/1
250 TABLET ORAL 2 TIMES DAILY WITH MEALS
COMMUNITY

## 2024-04-26 RX ORDER — PHENTERMINE HYDROCHLORIDE 37.5 MG/1
18.75 TABLET ORAL DAILY
Qty: 45 TABLET | Refills: 1 | Status: SHIPPED | OUTPATIENT
Start: 2024-04-26

## 2024-04-26 RX ORDER — EZETIMIBE 10 MG/1
10 TABLET ORAL DAILY
Qty: 90 TABLET | Refills: 3 | Status: SHIPPED | OUTPATIENT
Start: 2024-04-26

## 2024-04-29 ENCOUNTER — OFFICE VISIT (OUTPATIENT)
Dept: PHYSICAL THERAPY | Facility: MEDICAL CENTER | Age: 69
End: 2024-04-29
Attending: PHYSICIAN ASSISTANT
Payer: MEDICARE

## 2024-04-29 DIAGNOSIS — Z96.611 STATUS POST TOTAL SHOULDER ARTHROPLASTY, RIGHT: Primary | ICD-10-CM

## 2024-04-29 PROCEDURE — 97140 MANUAL THERAPY 1/> REGIONS: CPT

## 2024-04-29 PROCEDURE — 97110 THERAPEUTIC EXERCISES: CPT

## 2024-04-29 PROCEDURE — 97112 NEUROMUSCULAR REEDUCATION: CPT

## 2024-04-29 NOTE — PROGRESS NOTES
"Daily Note     Today's date: 2024  Patient name: Magali Stewart  : 1955  MRN: [id number]  Referring provider: Rivas Conner PA-C  Dx:   Encounter Diagnosis     ICD-10-CM    1. Status post total shoulder arthroplasty, right  Z96.611               Eval/Re-Eval POC Expires Auth #/ Referral # Total Visits Start Date Expiration Date Extension Info Visits Limitation   3/13  Medicare - no auth  BOMN        BOMN                                                  1 2 3 4 5 6   3/13 3/18 3/20 3/25 3/27 4/1   7 8 9 10 11 12   4/3 4/8 4/10 4/17 4/22 4/24   13 14 15 16 17 18           19 20 21 22 23 24           25 26 27 28 29 30             Start Time: 1009  Stop Time: 1102  Total time in clinic (min): 53 minutes    Subjective: Patient stated that she was a little stiff over the weekend, but feels better today.      Objective: See treatment diary below      Assessment: Tolerated treatment well.  Patient able to increase resistance on exercises as tolerated. Patient demonstrated decreased shoulder ER strength today with exercises. Patient demonstrated fatigue post treatment, exhibited good technique with therapeutic exercises, and would benefit from continued PT      Plan: Continue per plan of care.  Progress treament per protocol.      Precautions: standard precaution,   Past Medical History:   Diagnosis Date    Allergic 1970    Allergic rhinitis due to pollen 2020    Arthritis     hands    Asthma     Obesity     Sleep apnea     Uses CPAP         PT 1:1 entire time  Manuals           PROM R Shoulder  IB           Jt Mobes             STM/Massage Gun                          Neuro Re-Ed             Scapular Retractions 20x 20x           Reverse Shoulder Shrugs 20x 20x           Rhythmic Stabilization   30\" 3x          Bicep Curls 20x RTB 20x GTB 20x BTB          Triceps Extensions  20x GTB 20x BTB          Cane AAROM 20x flex/abd 20x flex/abd, 20x flex supine 20x flex/abd, 20x flex/ER supine  " "        Shoulder AROM Supine flex 20x 1#, sidelying abd,20x 1#, ER, 15x, supine ER 15x Supine flex/ER 20x, sidelying abd 20x Supine flex 20x 1#, supine ER 20x, sidelying abd 20x 1#, sidelying ER 20x          TB GH Row 20x GTB 20x GTB 20x BTB          TB GH Ext 20x GTB 20x GTB 20x BTB          TB IR/ER 15x ea RTB 15x IR GTB, 5x IR YTB 20x IR GTB          TB Step Outs  15x ER YTB 20x ER YTB          Shoulder Raises  15x YTB fwd/lat 10x YTB fwd/lat          Ther Ex             Pulleys 7' 7' 7'          Finger Ladder 10x flex/abd 15x flex/abd 15x 5\" hold flex/abd          Doorway Stretch   30\" 3x          GH Counter Stretch   30\" 3x                                                                           Ther Activity                                       Gait Training                                       Modalities                                         "

## 2024-05-01 ENCOUNTER — OFFICE VISIT (OUTPATIENT)
Dept: PHYSICAL THERAPY | Facility: MEDICAL CENTER | Age: 69
End: 2024-05-01
Attending: PHYSICIAN ASSISTANT
Payer: MEDICARE

## 2024-05-01 DIAGNOSIS — Z96.611 STATUS POST TOTAL SHOULDER ARTHROPLASTY, RIGHT: Primary | ICD-10-CM

## 2024-05-01 PROCEDURE — 97112 NEUROMUSCULAR REEDUCATION: CPT

## 2024-05-01 PROCEDURE — 97110 THERAPEUTIC EXERCISES: CPT

## 2024-05-01 PROCEDURE — 97140 MANUAL THERAPY 1/> REGIONS: CPT

## 2024-05-01 NOTE — PROGRESS NOTES
"Daily Note     Today's date: 2024  Patient name: Magali Stewart  : 1955  MRN: [id number]  Referring provider: Rivas Conner PA-C  Dx:   Encounter Diagnosis     ICD-10-CM    1. Status post total shoulder arthroplasty, right  Z96.611               Eval/Re-Eval POC Expires Auth #/ Referral # Total Visits Start Date Expiration Date Extension Info Visits Limitation   3/13  Medicare - no auth  BOMN        BOMN                                                  1 2 3 4 5 6   3/13 3/18 3/20 3/25 3/27 4/1   7 8 9 10 11 12   4/3 4/8 4/10 4/17 4/22 4/24   13 14 15 16 17 18           19 20 21 22 23 24           25 26 27 28 29 30             Start Time: 1459  Stop Time: 1544  Total time in clinic (min): 45 minutes    Subjective: Patient stated that she was a little stiff over the weekend, but feels better today.      Objective: See treatment diary below      Assessment: Tolerated treatment well.  Patient able to increase resistance on exercises as tolerated.Patient with some deficits in shoulder abduction and end range flexion. Patient demonstrated fatigue post treatment, exhibited good technique with therapeutic exercises, and would benefit from continued PT      Plan: Continue per plan of care.  Progress treament per protocol.      Precautions: standard precaution,   Past Medical History:   Diagnosis Date    Allergic 1970    Allergic rhinitis due to pollen 2020    Arthritis     hands    Asthma     Obesity     Sleep apnea     Uses CPAP         PT 1:1 entire time  Manuals          PROM R Shoulder  IB  IB         Jt Mobes             STM/Massage Gun                          Neuro Re-Ed             Scapular Retractions 20x 20x  20x         Reverse Shoulder Shrugs 20x 20x           Rhythmic Stabilization   30\" 3x          Bicep Curls 20x RTB 20x GTB 20x BTB 20x BTB         Triceps Extensions  20x GTB 20x BTB 20x BTB         Cane AAROM 20x flex/abd 20x flex/abd, 20x flex supine 20x flex/abd, " "20x flex/ER supine 20x flex/abd, 20x flex/ER supine         Shoulder AROM Supine flex 20x 1#, sidelying abd,20x 1#, ER, 15x, supine ER 15x Supine flex/ER 20x, sidelying abd 20x Supine flex 20x 1#, supine ER 20x, sidelying abd 20x 1#, sidelying ER 20x Supine flex 20x 2#, supine ER 20x, sidelying abd 20x 1#, sidelying ER 20x         TB GH Row 20x GTB 20x GTB 20x BTB 20x BTB         TB GH Ext 20x GTB 20x GTB 20x BTB 20x BTB         TB IR/ER 15x ea RTB 15x IR GTB, 5x IR YTB 20x IR GTB 20x IR GTB, 20 ER YTB         TB Step Outs  15x ER YTB 20x ER YTB 20x ER RTB         Shoulder Raises  15x YTB fwd/lat 10x YTB fwd/lat 10x RTB fwd/lat         Ther Ex             Pulleys 7' 7' 7' 7'         Finger Ladder 10x flex/abd 15x flex/abd 15x 5\" hold flex/abd 15x 5\" hold flex/abd         Doorway Stretch   30\" 3x 30\" 3x         GH Counter Stretch   30\" 3x 30\" 3x                                                                          Ther Activity                                       Gait Training                                       Modalities                                         "

## 2024-05-06 ENCOUNTER — OFFICE VISIT (OUTPATIENT)
Dept: PHYSICAL THERAPY | Facility: MEDICAL CENTER | Age: 69
End: 2024-05-06
Attending: PHYSICIAN ASSISTANT
Payer: MEDICARE

## 2024-05-06 DIAGNOSIS — Z96.611 STATUS POST TOTAL SHOULDER ARTHROPLASTY, RIGHT: Primary | ICD-10-CM

## 2024-05-06 PROCEDURE — 97110 THERAPEUTIC EXERCISES: CPT

## 2024-05-06 PROCEDURE — 97112 NEUROMUSCULAR REEDUCATION: CPT

## 2024-05-06 NOTE — PROGRESS NOTES
"Daily Note     Today's date: 2024  Patient name: Magali Stewart  : 1955  MRN: [id number]  Referring provider: Rivas Conner PA-C  Dx:   Encounter Diagnosis     ICD-10-CM    1. Status post total shoulder arthroplasty, right  Z96.611               Eval/Re-Eval POC Expires Auth #/ Referral # Total Visits Start Date Expiration Date Extension Info Visits Limitation   3/13  Medicare - no auth  BOMN        BOMN                                                  1 2 3 4 5 6   3/13 3/18 3/20 3/25 3/27 4/1   7 8 9 10 11 12   4/3 4/8 4/10 4/17 4/22 4/24   13 14 15 16 17 18           19 20 21 22 23 24           25 26 27 28 29 30             Start Time: 1020  Stop Time: 1100  Total time in clinic (min): 40 minutes    Subjective: Patient stated that she was a little stiff over the weekend, but feels better today.      Objective: See treatment diary below      Assessment: Tolerated treatment well.  Patient able to increase resistance on exercises as tolerated.Patient with some deficits in shoulder abduction and end range flexion. Patient demonstrated fatigue post treatment, exhibited good technique with therapeutic exercises, and would benefit from continued PT      Plan: Continue per plan of care.  Progress treament per protocol.      Precautions: standard precaution,   Past Medical History:   Diagnosis Date    Allergic 1970    Allergic rhinitis due to pollen 2020    Arthritis     hands    Asthma     Obesity     Sleep apnea     Uses CPAP         PT 1:1 9955-7059  Manuals         PROM R Shoulder  IB  IB         Jt Mobes             STM/Massage Gun                          Neuro Re-Ed             Scapular Retractions 20x 20x  20x 20x        Reverse Shoulder Shrugs 20x 20x           Rhythmic Stabilization   30\" 3x          Bicep Curls 20x RTB 20x GTB 20x BTB 20x BTB 20x BTB        Triceps Extensions  20x GTB 20x BTB 20x BTB 20x BTB        Cane AAROM 20x flex/abd 20x flex/abd, 20x flex " "supine 20x flex/abd, 20x flex/ER supine 20x flex/abd, 20x flex/ER supine 15x ea, 20x flex/ER supine        Shoulder AROM Supine flex 20x 1#, sidelying abd,20x 1#, ER, 15x, supine ER 15x Supine flex/ER 20x, sidelying abd 20x Supine flex 20x 1#, supine ER 20x, sidelying abd 20x 1#, sidelying ER 20x Supine flex 20x 2#, supine ER 20x, sidelying abd 20x 1#, sidelying ER 20x Supine flex 20x 2#, supine ER 20x, sidelying abd 20x 1#, sidelying ER 20x        TB GH Row 20x GTB 20x GTB 20x BTB 20x BTB 20x BTB        TB GH Ext 20x GTB 20x GTB 20x BTB 20x BTB 20x BTB        TB IR/ER 15x ea RTB 15x IR GTB, 5x IR YTB 20x IR GTB 20x IR GTB, 20 ER YTB 20x IR BTB, 20x ER YTB        TB Step Outs  15x ER YTB 20x ER YTB 20x ER RTB 20x ER RTB        Shoulder Raises  15x YTB fwd/lat 10x YTB fwd/lat 10x RTB fwd/lat         Ther Ex             Pulleys 7' 7' 7' 7' 7'        Finger Ladder 10x flex/abd 15x flex/abd 15x 5\" hold flex/abd 15x 5\" hold flex/abd 10x 5\" hold flex/abd        Doorway Stretch   30\" 3x 30\" 3x 30\" 3x        GH Counter Stretch   30\" 3x 30\" 3x 10x 10\" hold                                                                         Ther Activity                                       Gait Training                                       Modalities                                         "

## 2024-05-08 ENCOUNTER — OFFICE VISIT (OUTPATIENT)
Dept: PHYSICAL THERAPY | Facility: MEDICAL CENTER | Age: 69
End: 2024-05-08
Attending: PHYSICIAN ASSISTANT
Payer: MEDICARE

## 2024-05-08 DIAGNOSIS — Z96.611 STATUS POST TOTAL SHOULDER ARTHROPLASTY, RIGHT: Primary | ICD-10-CM

## 2024-05-08 PROCEDURE — 97140 MANUAL THERAPY 1/> REGIONS: CPT

## 2024-05-08 PROCEDURE — 97112 NEUROMUSCULAR REEDUCATION: CPT

## 2024-05-08 PROCEDURE — 97110 THERAPEUTIC EXERCISES: CPT

## 2024-05-08 NOTE — PROGRESS NOTES
"Daily Note     Today's date: 2024  Patient name: Magali Stewart  : 1955  MRN: [id number]  Referring provider: Rivas Conner PA-C  Dx:   Encounter Diagnosis     ICD-10-CM    1. Status post total shoulder arthroplasty, right  Z96.611               Eval/Re-Eval POC Expires Auth #/ Referral # Total Visits Start Date Expiration Date Extension Info Visits Limitation   3/13  Medicare - no auth  BOMN        BOMN                                                  1 2 3 4 5 6   3/13 3/18 3/20 3/25 3/27 4/1   7 8 9 10 11 12   4/3 4/8 4/10 4/17 4/22 4/24   13 14 15 16 17 18          19 20 21 22 23 24           25 26 27 28 29 30             Start Time: 1500  Stop Time: 1545  Total time in clinic (min): 45 minutes    Subjective: Patient stated that her shoulder feels pretty good today.      Objective: See treatment diary below      Assessment: Tolerated treatment well. Patient able to increase resistance on exercises as tolerated. Patient noted no increase in pain with exercises. Patient continues to demonstrate deficits in global shoulder strength. Patient demonstrated fatigue post treatment, exhibited good technique with therapeutic exercises, and would benefit from continued PT      Plan: Continue per plan of care.  Progress treament per protocol.      Precautions: standard precaution,   Past Medical History:   Diagnosis Date    Allergic 1970    Allergic rhinitis due to pollen 2020    Arthritis     hands    Asthma     Obesity     Sleep apnea     Uses CPAP         PT 1:1 1113-0849  Manuals        PROM R Shoulder  IB  IB  IB       Jt Mobes             STM/Massage Gun                          Neuro Re-Ed             Scapular Retractions 20x 20x  20x 20x 20x       Reverse Shoulder Shrugs 20x 20x           Rhythmic Stabilization   30\" 3x          Bicep Curls 20x RTB 20x GTB 20x BTB 20x BTB 20x BTB 20x Blk TB       Triceps Extensions  20x GTB 20x BTB 20x BTB 20x BTB 20x Blk TB     " "  Cane AAROM 20x flex/abd 20x flex/abd, 20x flex supine 20x flex/abd, 20x flex/ER supine 20x flex/abd, 20x flex/ER supine 15x ea, 20x flex/ER supine 20x ea , supine flex 20x 1#       Shoulder AROM Supine flex 20x 1#, sidelying abd,20x 1#, ER, 15x, supine ER 15x Supine flex/ER 20x, sidelying abd 20x Supine flex 20x 1#, supine ER 20x, sidelying abd 20x 1#, sidelying ER 20x Supine flex 20x 2#, supine ER 20x, sidelying abd 20x 1#, sidelying ER 20x Supine flex 20x 2#, supine ER 20x, sidelying abd 20x 1#, sidelying ER 20x Supine flex 20x 2#,, sidelying ER 20x       TB GH Row 20x GTB 20x GTB 20x BTB 20x BTB 20x BTB 20x Blk TB       TB GH Ext 20x GTB 20x GTB 20x BTB 20x BTB 20x BTB 20x Blk TB       TB IR/ER 15x ea RTB 15x IR GTB, 5x IR YTB 20x IR GTB 20x IR GTB, 20 ER YTB 20x IR BTB, 20x ER YTB 20x IR BTB, 20x ER YTB       TB Step Outs  15x ER YTB 20x ER YTB 20x ER RTB 20x ER RTB 20xER RTB       Shoulder Raises  15x YTB fwd/lat 10x YTB fwd/lat 10x RTB fwd/lat         Ther Ex             Pulleys 7' 7' 7' 7' 7' 7'       Finger Ladder 10x flex/abd 15x flex/abd 15x 5\" hold flex/abd 15x 5\" hold flex/abd 10x 5\" hold flex/abd 10x 5\" hold flex/ abd       Doorway Stretch   30\" 3x 30\" 3x 30\" 3x 30\" 4x       GH Counter Stretch   30\" 3x 30\" 3x 10x 10\" hold 30\" 3x                                                                        Ther Activity                                       Gait Training                                       Modalities                                         "

## 2024-05-13 ENCOUNTER — OFFICE VISIT (OUTPATIENT)
Dept: PHYSICAL THERAPY | Facility: MEDICAL CENTER | Age: 69
End: 2024-05-13
Attending: PHYSICIAN ASSISTANT
Payer: MEDICARE

## 2024-05-13 DIAGNOSIS — Z96.611 STATUS POST TOTAL SHOULDER ARTHROPLASTY, RIGHT: Primary | ICD-10-CM

## 2024-05-13 PROCEDURE — 97110 THERAPEUTIC EXERCISES: CPT

## 2024-05-13 PROCEDURE — 97140 MANUAL THERAPY 1/> REGIONS: CPT

## 2024-05-13 PROCEDURE — 97112 NEUROMUSCULAR REEDUCATION: CPT

## 2024-05-13 NOTE — PROGRESS NOTES
"Daily Note     Today's date: 2024  Patient name: Magali Stewart  : 1955  MRN: [id number]  Referring provider: Rivas Conner PA-C  Dx:   Encounter Diagnosis     ICD-10-CM    1. Status post total shoulder arthroplasty, right  Z96.611               Eval/Re-Eval POC Expires Auth #/ Referral # Total Visits Start Date Expiration Date Extension Info Visits Limitation   3/13  Medicare - no auth  BOMN        BOMN                                                  1 2 3 4 5 6   3/13 3/18 3/20 3/25 3/27 4/1   7 8 9 10 11 12   4/3 4/8 4/10 4/17 4/22 4/24   13 14 15 16 17 18         19 20 21 22 23 24           25 26 27 28 29 30             Start Time: 1014  Stop Time: 1055  Total time in clinic (min): 41 minutes    Subjective: Patient stated that she is sore today. She stated that she was okay during last session but the day after it was sore.      Objective: See treatment diary below      Assessment: Tolerated treatment well. Patient able to increase resistance on exercises as tolerated. Patient demonstrated improvements in shoulder ER today with exercises. Patient continues to progress as tolerated and still has deficits in global shoulder range and ROM. Patient demonstrated fatigue post treatment, exhibited good technique with therapeutic exercises, and would benefit from continued PT      Plan: Continue per plan of care.  Progress treament per protocol.      Precautions: standard precaution,   Past Medical History:   Diagnosis Date     1970    Allergic rhinitis due to pollen 2020    Arthritis     hands    Asthma     Obesity     Sleep apnea     Uses CPAP         PT 1:1 entire time  Manuals       PROM R Shoulder  IB  IB  IB IB      Jt Mobes             STM/Massage Gun                          Neuro Re-Ed             Scapular Retractions 20x 20x  20x 20x 20x       Reverse Shoulder Shrugs 20x 20x           Rhythmic Stabilization   30\" 3x          Bicep Curls " "20x RTB 20x GTB 20x BTB 20x BTB 20x BTB 20x Blk TB 20x Blk TB      Triceps Extensions  20x GTB 20x BTB 20x BTB 20x BTB 20x Blk TB 20x Blk TB      Cane AAROM 20x flex/abd 20x flex/abd, 20x flex supine 20x flex/abd, 20x flex/ER supine 20x flex/abd, 20x flex/ER supine 15x ea, 20x flex/ER supine 20x ea , supine flex 20x 1# Supine flex 20x 2#      Shoulder AROM Supine flex 20x 1#, sidelying abd,20x 1#, ER, 15x, supine ER 15x Supine flex/ER 20x, sidelying abd 20x Supine flex 20x 1#, supine ER 20x, sidelying abd 20x 1#, sidelying ER 20x Supine flex 20x 2#, supine ER 20x, sidelying abd 20x 1#, sidelying ER 20x Supine flex 20x 2#, supine ER 20x, sidelying abd 20x 1#, sidelying ER 20x Supine flex 20x 2#,, sidelying ER 20x Supine flex 20x 3#, sidelying ER 20x      TB GH Row 20x GTB 20x GTB 20x BTB 20x BTB 20x BTB 20x Blk TB 20x Blk TB      TB GH Ext 20x GTB 20x GTB 20x BTB 20x BTB 20x BTB 20x Blk TB 20x Blk TB      TB IR/ER 15x ea RTB 15x IR GTB, 5x IR YTB 20x IR GTB 20x IR GTB, 20 ER YTB 20x IR BTB, 20x ER YTB 20x IR BTB, 20x ER YTB 20x IR BTB, 20x ER RTB      TB Step Outs  15x ER YTB 20x ER YTB 20x ER RTB 20x ER RTB 20xER RTB 20x ER GTB      Shoulder Raises  15x YTB fwd/lat 10x YTB fwd/lat 10x RTB fwd/lat         Ther Ex             Pulleys 7' 7' 7' 7' 7' 7' 7'      Finger Ladder 10x flex/abd 15x flex/abd 15x 5\" hold flex/abd 15x 5\" hold flex/abd 10x 5\" hold flex/abd 10x 5\" hold flex/ abd 10x 5\" hold flex/abd      Doorway Stretch   30\" 3x 30\" 3x 30\" 3x 30\" 4x 30\" 3x      GH Counter Stretch   30\" 3x 30\" 3x 10x 10\" hold 30\" 3x 30\" 4x                                                                       Ther Activity                                       Gait Training                                       Modalities                                         "

## 2024-05-13 NOTE — PROGRESS NOTES
PT Re-Evaluation     Today's date: 2024  Patient name: Magali Stewart  : 1955  MRN: [id number]  Referring provider: Rivas Conner PA-C  Dx:   No diagnosis found.    Eval/Re-Eval POC Expires Auth #/ Referral # Total Visits Start Date Expiration Date Extension Info Visits Limitation   3/13  Medicare - no auth  BOMN        BOMN                                                  1 2 3 4 5 6   3/13 3/18 3/20 3/25 3/27 4/1   7 8 9 10 11 12   4/3 4/8 4/10 4/15     13 14 15 16 17 18           19 20 21 22 23 24           25 26 27 28 29 30                          Assessment  Assessment details: IE:  Magali Stewart is a 68 y.o. female who presents to PT with a referral from Dr. Conner for a medical diagnosis of Status post total shoulder arthroplasty, right  (primary encounter diagnosis). Patient presents to PT with limitations in ROM, strength, functional mobility, and postural stability secondary to pain, decreased muscular endurance, decreased neuromuscular control, and MD protocol. Strength and AROM not tested today per protocol. Patient with deficits in PROM secondary to pain, tolerated PROM well. Patient noted some tenderness during palpation over anterior deltoid, biceps, bicipital groove, and biceps tendon. Patient able to complete HEP without increase pain. Patient's key impairments include: decreased ROM, decreased strength, decreased endurance, pain with functional activities, and poor body mechanics. The limitations listed above affect patient's ability to reach overhead, reach behind back, reach, lift/carry, push, pull, shower, get dressed, drive, exercise, and grasp, perform recreational activities and ADLs and decrease patient's quality of life. Patient to benefit from skilled PT to address these limitations,  increase ROM, improve strength, reduce pain, improve activity tolerance, and improve quality of life     4/15/2024  Patient has completed 10 PT sessions to this date.  The patient has made  improvements in shoulder ROM and strength, however continues to have deficits in strength against gravity. Patient still demonstrates significant deficits in strength with AROM against gravity, therefore affecting her ability to complete ADLs and perform recreational activities. Patient was educated about shoulder progress thus far and how continued ROM and strengthening will further improve remaining limitations. HEP progressed and updated to reflect current patient function. Patient was educated about safety with exercises and educated on performing correctly and safely.  All other questions and concerns were addressed.  I believe this patient will continue to benefit from skilled PT to address remaining deficits in shoulder ROM and strength and improve activity tolerance and quality of life.    Impairments: abnormal muscle firing, abnormal muscle tone, abnormal or restricted ROM, activity intolerance, impaired physical strength, lacks appropriate home exercise program, pain with function, weight-bearing intolerance, poor posture  and poor body mechanics    Symptom irritability: lowUnderstanding of Dx/Px/POC: good   Prognosis: good    Goals  STG (2-4 Weeks)  Patient will have an increase in R shoulder flexion PROM to 90 degrees in 4 weeks to promote increased mobility in 4 weeks. MET  Patient will have a decrease in pain at worst by 2 points on the NPRS to improve quality of life in 4 weeks. MET  Patient will be efficient and compliant with comprehensive HEP in 4 weeks. MET    LTG (4-8 Weeks)  Patient will have a FOTO of anticipated or greater by discharge. PROGRESSING  Patient will be able to reach to shoulder height without pain to help with getting items off shelves by discharge. PROGRESSING  Patient will be able to get dressed without pain to improve quality of life by discharge. MET    Plan  Patient would benefit from: skilled physical therapy  Planned modality interventions: TENS, thermotherapy:  hydrocollator packs, cryotherapy, electrical stimulation/Russian stimulation, traction and unattended electrical stimulation  Planned therapy interventions: abdominal trunk stabilization, IASTM, joint mobilization, activity modification, kinesiology taping, ADL training, manual therapy, massage, Yousif taping, balance, balance/weight bearing training, motor coordination training, behavior modification, muscle pump exercises, body mechanics training, nerve gliding, neuromuscular re-education, breathing training, patient education, postural training, coordination, self care, transfer training, therapeutic training, therapeutic exercise, therapeutic activities, stretching, strengthening, fine motor coordination training, flexibility, functional ROM exercises, gait training, graded activity, graded exercise, graded motor, home exercise program, IADL retraining and work reintegration  Frequency: 2x week  Duration in weeks: 12  Plan of Care beginning date: 3/13/2024  Plan of Care expiration date: 6/5/2024  Treatment plan discussed with: patient        Subjective Evaluation    History of Present Illness  Date of surgery: 2/28/2024.  Mechanism of injury: surgery  Mechanism of injury: IE:  Patient reports to PT s/p R TSA that occurred on 2/28/2024. Patient rated their current pain 1/10, at its worst 10/10, and at its best 0/10 on the NPRS. Patient described the pain as dull and achy and stated that it lasts less than 15 minutes. Patient stated that ice, OTC NSAIDS, and rest makes it better, and reaching, reaching overhead, reaching behind back, carrying, pushing, pulling, lifting, and driving makes it worse. Patient reported that the pain is worse depending on if she accidentally moves it around and denies waking them at night. Patient denies changes to  bowel and bladder, chest pain, night pain, or fever. Patient reports no prior treatment for CC. Patient's goals of PT are to decrease pain, improve ROM, and increase  strength     4/15/2024  Patient stated that her pain has been very well managed. She stated that she still has weakness with lifting her arm but still has been working on using it more. She stated that she saw her doctor today and he wanted to work on strengthening.   Quality of life: good    Patient Goals  Patient goals for therapy: decreased pain, increased motion and increased strength    Pain  Current pain ratin  At best pain ratin  At worst pain ratin  Quality: dull ache  Relieving factors: ice, relaxation, rest, support and medications  Aggravating factors: overhead activity and lifting  Progression: no change          Objective     Postural Observations  Seated posture: fair  Standing posture: fair      Observations     Right Shoulder  Positive for incision.     Palpation   Left   No palpable tenderness to the biceps.     Right   No palpable tenderness to the infraspinatus, latissimus, middle deltoid, middle trapezius, posterior deltoid, rhomboids, subscapularis, supraspinatus, triceps and upper trapezius.   Tenderness of the anterior deltoid, biceps and pectoralis major.     Tenderness     Right Shoulder  Tenderness in the biceps tendon (proximal) and bicipital groove.     Neurological Testing     Sensation     Shoulder   Left Shoulder   Intact: light touch    Right Shoulder   Intact: Light touch    Active Range of Motion     Right Shoulder   Flexion: 128 degrees with pain  Abduction: 90 degrees   External rotation 0°: 5 degrees with pain    Passive Range of Motion     Right Shoulder   Flexion: 144 degrees with pain  Abduction: 98 degrees with pain  External rotation 0°: 35 degrees with pain    Strength/Myotome Testing     Right Shoulder     Planes of Motion   Flexion: 2   Extension: 2   Abduction: 2   External rotation at 0°: 2   Internal rotation at 0°: 2                  Precautions: standard precaution,   Past Medical History:   Diagnosis Date    1970    Allergic rhinitis due to  "pollen 09/18/2020    Arthritis     hands    Asthma     Obesity     Sleep apnea     Uses CPAP       PT 1:1 entire time  Manuals 3/18 3/20 3/25 3/27 4/1 4/3 4/8 4/10 4/15    PROM R Shoulder IB IB IB IB IB IB IB IB IB    Jt Mobes             STM/Massage Gun                          Neuro Re-Ed             Scapular Retractions 20x 20x 20x 20x 20x 20x 20x 20x 20x    Reverse Shoulder Shrugs         20x    Bicep Curls 15x AAROM 15x AAROM 15x AAROM 15x AAROM 20x 20x 20x 20x 20x YTB    Cane AAROM 15x flex/abd/ER 15x flex/abd/ER 15x flex/abd/ER 15x flex/abd/ER 15x flex/abd/ER 2x10 flex/abd/ER 20x flex/abd/ER 20x flex/abd 15x flex/abd    Shoulder AROM   Supine 15x ea flex/abd AAROM Supine 2x10 ea flex/abd, 15x flex AAROM Supine 2x10 ea flex/abd, 15x flex AAROM Supine 2x10 ea flex/abd, 15x flex AAROM Supine 2x10 ea flex/abd, 20x flex AAROM Supine 2x10 ea flex/abd, 20x flex AAROM Supine flex 20x 1#, sidelying abd,20x 1#, ER, 15x    GH Isos 5x 5\" hold 5x 5\" hold           Towel Squeezes             Pendulums 10x ea CW/CCW 20x ea CW/CCW 20x ea CW/CCW 20x ea CW/CCW 20x ea CW/CCW 20x ea CW/CCW 20x ea CW/CCW 20x ea CW/CCW HEP    TB GH Row         20x RTB    TB GH Ext         20x RTB    TB IR/ER         15x ea YTB    Ther Ex             Pulleys 5' 5' 7' 7' 7' 7' 5' 7' 7'    Finger Ladder    10x flex/abd 10x flex/abd 10x flex/abd 10x flex/abd 15x flex/abd 15x flex/abd    Table Slides 15x flex/abd with ball 15x flex/abd with ball 15x flex/abd with ball 20x flex/abd with ball 20x flex/abd with ball        UT Stretch 30\" 3x ea 30\" 3x ea 30\" 3x ea HEP                                                                          Ther Activity                                       Gait Training                                       Modalities                                              "

## 2024-05-15 ENCOUNTER — OFFICE VISIT (OUTPATIENT)
Dept: PHYSICAL THERAPY | Facility: MEDICAL CENTER | Age: 69
End: 2024-05-15
Attending: PHYSICIAN ASSISTANT
Payer: MEDICARE

## 2024-05-15 DIAGNOSIS — Z96.611 STATUS POST TOTAL SHOULDER ARTHROPLASTY, RIGHT: Primary | ICD-10-CM

## 2024-05-15 PROCEDURE — 97110 THERAPEUTIC EXERCISES: CPT

## 2024-05-15 PROCEDURE — 97530 THERAPEUTIC ACTIVITIES: CPT

## 2024-05-15 PROCEDURE — 97112 NEUROMUSCULAR REEDUCATION: CPT

## 2024-05-15 NOTE — PROGRESS NOTES
PT Re-Evaluation     Today's date: 5/15/2024  Patient name: Magali Stewart  : 1955  MRN: [id number]  Referring provider: Rivas Conner PA-C  Dx:   Encounter Diagnosis     ICD-10-CM    1. Status post total shoulder arthroplasty, right  Z96.611         Eval/Re-Eval POC Expires Auth #/ Referral # Total Visits Start Date Expiration Date Extension Info Visits Limitation   3/13  Medicare - no auth  BOMN        BOMN                                                  1 2 3 4 5 6   3/13 3/18 3/20 3/25 3/27 4/1   7 8 9 10 11 12   4/3 4/8 4/10 4/15 4/17 4/22   13 14 15 16 17 18      19 20 21 22 23 24   5/15        25 26 27 28 29 30               Start Time: 1503  Stop Time: 1541  Total time in clinic (min): 38 minutes    Assessment  Impairments: abnormal muscle firing, abnormal muscle tone, abnormal or restricted ROM, activity intolerance, impaired physical strength, lacks appropriate home exercise program, pain with function, weight-bearing intolerance, poor posture  and poor body mechanics  Symptom irritability: low    Assessment details: IE:  Magali Stewart is a 68 y.o. female who presents to PT with a referral from Dr. Conner for a medical diagnosis of Status post total shoulder arthroplasty, right  (primary encounter diagnosis). Patient presents to PT with limitations in ROM, strength, functional mobility, and postural stability secondary to pain, decreased muscular endurance, decreased neuromuscular control, and MD protocol. Strength and AROM not tested today per protocol. Patient with deficits in PROM secondary to pain, tolerated PROM well. Patient noted some tenderness during palpation over anterior deltoid, biceps, bicipital groove, and biceps tendon. Patient able to complete HEP without increase pain. Patient's key impairments include: decreased ROM, decreased strength, decreased endurance, pain with functional activities, and poor body mechanics. The limitations listed above affect  patient's ability to reach overhead, reach behind back, reach, lift/carry, push, pull, shower, get dressed, drive, exercise, and grasp, perform recreational activities and ADLs and decrease patient's quality of life. Patient to benefit from skilled PT to address these limitations,  increase ROM, improve strength, reduce pain, improve activity tolerance, and improve quality of life     4/15/2024  Patient has completed 10 PT sessions to this date.  The patient has made improvements in shoulder ROM and strength, however continues to have deficits in strength against gravity. Patient still demonstrates significant deficits in strength with AROM against gravity, therefore affecting her ability to complete ADLs and perform recreational activities. Patient was educated about shoulder progress thus far and how continued ROM and strengthening will further improve remaining limitations. HEP progressed and updated to reflect current patient function. Patient was educated about safety with exercises and educated on performing correctly and safely.  All other questions and concerns were addressed.  I believe this patient will continue to benefit from skilled PT to address remaining deficits in shoulder ROM and strength and improve activity tolerance and quality of life.    5/15/2024  Patient has completed 19 PT sessions to this date and has been progressing well.  Upon Re-Evaluation, the patient demonstrates improvements in shoulder ROM, strength, neuromuscular control, pain, and activity tolerance. Patient continues to demonstrate deficits in end ranges and strength with activities  therefore affecting the patient's quality of life and level of function, limiting the patient from lifting, pushing, pulling, carrying, reaching, reaching overhead, and completing ADLs .  Patient was educated about shoulder progress thus far and how continued ROM and strengthening will further improve remaining limitations. HEP progressed and  updated to reflect current patient function. Patient was educated about safety with exercises and educated on performing correctly and safely.  All other questions and concerns were addressed.  I believe this patient will continue to benefit from skilled PT to address remaining deficits in shoulder ROM, strength, neuromuscular control, pain, and activity tolerance, improve activity tolerance, and quality of life.      Understanding of Dx/Px/POC: good     Prognosis: good    Goals  STG (2-4 Weeks)  Patient will have an increase in R shoulder flexion PROM to 90 degrees in 4 weeks to promote increased mobility in 4 weeks. MET  Patient will have a decrease in pain at worst by 2 points on the NPRS to improve quality of life in 4 weeks. MET  Patient will be efficient and compliant with comprehensive HEP in 4 weeks. MET    LTG (4-8 Weeks)  Patient will have a FOTO of anticipated or greater by discharge. PROGRESSING  Patient will be able to reach to shoulder height without pain to help with getting items off shelves by discharge. MET  Patient will be able to get dressed without pain to improve quality of life by discharge. MET    Plan  Patient would benefit from: skilled physical therapy  Planned modality interventions: TENS, thermotherapy: hydrocollator packs, cryotherapy, electrical stimulation/Russian stimulation, traction and unattended electrical stimulation    Planned therapy interventions: abdominal trunk stabilization, IASTM, joint mobilization, activity modification, kinesiology taping, ADL training, manual therapy, massage, Yousif taping, balance, balance/weight bearing training, motor coordination training, behavior modification, muscle pump exercises, body mechanics training, nerve gliding, neuromuscular re-education, breathing training, patient education, postural training, coordination, self care, transfer training, therapeutic training, therapeutic exercise, therapeutic activities, stretching,  strengthening, fine motor coordination training, flexibility, functional ROM exercises, gait training, graded activity, graded exercise, graded motor, home exercise program, IADL retraining and work reintegration    Frequency: 2x week  Duration in weeks: 12  Plan of Care beginning date: 5/15/2024  Plan of Care expiration date: 8/7/2024  Treatment plan discussed with: patient        Subjective Evaluation    History of Present Illness  Date of surgery: 2/28/2024.  Mechanism of injury: surgery  Mechanism of injury: IE:  Patient reports to PT s/p R TSA that occurred on 2/28/2024. Patient rated their current pain 1/10, at its worst 10/10, and at its best 0/10 on the NPRS. Patient described the pain as dull and achy and stated that it lasts less than 15 minutes. Patient stated that ice, OTC NSAIDS, and rest makes it better, and reaching, reaching overhead, reaching behind back, carrying, pushing, pulling, lifting, and driving makes it worse. Patient reported that the pain is worse depending on if she accidentally moves it around and denies waking them at night. Patient denies changes to  bowel and bladder, chest pain, night pain, or fever. Patient reports no prior treatment for CC. Patient's goals of PT are to decrease pain, improve ROM, and increase strength     4/15/2024  Patient stated that her pain has been very well managed. She stated that she still has weakness with lifting her arm but still has been working on using it more. She stated that she saw her doctor today and he wanted to work on strengthening.     5/15/2024  Patient stated that she has no pain coming in. She stated that she isn't sure if she slept on it wrong or if she over exercises but she had some pain on Thursday. She stated that she still has problems with strength and range. She stated that she feels as though she is better able to reach higher and to the side. She stated that she has been better able to carry things. She stated that she is  completing her exercises at home.  Quality of life: good    Patient Goals  Patient goals for therapy: decreased pain, increased motion and increased strength    Pain  Current pain ratin  At best pain ratin  At worst pain ratin  Quality: dull ache  Relieving factors: ice, relaxation, rest, support and medications  Aggravating factors: overhead activity and lifting  Progression: no change          Objective     Postural Observations  Seated posture: fair  Standing posture: fair      Observations     Right Shoulder  Positive for incision.     Palpation   Left   No palpable tenderness to the biceps.     Right   No palpable tenderness to the anterior deltoid, biceps, infraspinatus, latissimus, middle deltoid, middle trapezius, pectoralis major, posterior deltoid, rhomboids, subscapularis, supraspinatus, triceps and upper trapezius.     Tenderness     Right Shoulder  Tenderness in the biceps tendon (proximal) and bicipital groove.     Neurological Testing     Sensation     Shoulder   Left Shoulder   Intact: light touch    Right Shoulder   Intact: Light touch    Active Range of Motion     Right Shoulder   Flexion: 140 degrees   Abduction: 130 degrees   External rotation 0°: 40 degrees     Passive Range of Motion     Right Shoulder   Flexion: 152 degrees with pain  Abduction: 124 degrees with pain  External rotation 0°: 40 degrees     Strength/Myotome Testing     Right Shoulder     Planes of Motion   Flexion: 3   Extension: 3   Abduction: 3   External rotation at 0°: 3   Internal rotation at 0°: 3                  Precautions: standard precaution,   Past Medical History:   Diagnosis Date    Allergic 1970    Allergic rhinitis due to pollen 2020    Arthritis     hands    Asthma     Obesity     Sleep apnea     Uses CPAP     PT 1:1 entire time  Manuals  5 5/ 5 5/15     PROM R Shoulder  IB  IB  IB IB IB     Jt Mobes             STM/Massage Gun                          Neuro Re-Ed          "    Scapular Retractions 20x 20x  20x 20x 20x  20x     Reverse Shoulder Shrugs 20x 20x           Rhythmic Stabilization   30\" 3x          Bicep Curls 20x RTB 20x GTB 20x BTB 20x BTB 20x BTB 20x Blk TB 20x Blk TB 20x Blk TB     Triceps Extensions  20x GTB 20x BTB 20x BTB 20x BTB 20x Blk TB 20x Blk TB 20x Blk TB     Cane AAROM 20x flex/abd 20x flex/abd, 20x flex supine 20x flex/abd, 20x flex/ER supine 20x flex/abd, 20x flex/ER supine 15x ea, 20x flex/ER supine 20x ea , supine flex 20x 1# Supine flex 20x 2# Supine flex 20x 3#     Shoulder AROM Supine flex 20x 1#, sidelying abd,20x 1#, ER, 15x, supine ER 15x Supine flex/ER 20x, sidelying abd 20x Supine flex 20x 1#, supine ER 20x, sidelying abd 20x 1#, sidelying ER 20x Supine flex 20x 2#, supine ER 20x, sidelying abd 20x 1#, sidelying ER 20x Supine flex 20x 2#, supine ER 20x, sidelying abd 20x 1#, sidelying ER 20x Supine flex 20x 2#,, sidelying ER 20x Supine flex 20x 3#, sidelying ER 20x Supine flex 20x 3#     TB GH Row 20x GTB 20x GTB 20x BTB 20x BTB 20x BTB 20x Blk TB 20x Blk TB 20x Blk TB     TB GH Ext 20x GTB 20x GTB 20x BTB 20x BTB 20x BTB 20x Blk TB 20x Blk TB 20x Blk TB     TB IR/ER 15x ea RTB 15x IR GTB, 5x IR YTB 20x IR GTB 20x IR GTB, 20 ER YTB 20x IR BTB, 20x ER YTB 20x IR BTB, 20x ER YTB 20x IR BTB, 20x ER RTB 20x IR BTB, 20x ER RTB     TB Step Outs  15x ER YTB 20x ER YTB 20x ER RTB 20x ER RTB 20xER RTB 20x ER GTB 20x ER GTB     Shoulder Raises  15x YTB fwd/lat 10x YTB fwd/lat 10x RTB fwd/lat         Ther Ex             Pulleys 7' 7' 7' 7' 7' 7' 7' 7'     Finger Ladder 10x flex/abd 15x flex/abd 15x 5\" hold flex/abd 15x 5\" hold flex/abd 10x 5\" hold flex/abd 10x 5\" hold flex/ abd 10x 5\" hold flex/abd 10x 5\" hold flex/abd     Doorway Stretch   30\" 3x 30\" 3x 30\" 3x 30\" 4x 30\" 3x 30\" 3x     GH Counter Stretch   30\" 3x 30\" 3x 10x 10\" hold 30\" 3x 30\" 4x 30\" 4x                                                                      Ther Activity                        "                Gait Training                                       Modalities

## 2024-05-20 ENCOUNTER — OFFICE VISIT (OUTPATIENT)
Dept: OBGYN CLINIC | Facility: MEDICAL CENTER | Age: 69
End: 2024-05-20

## 2024-05-20 ENCOUNTER — APPOINTMENT (OUTPATIENT)
Dept: RADIOLOGY | Facility: MEDICAL CENTER | Age: 69
End: 2024-05-20
Payer: MEDICARE

## 2024-05-20 VITALS
SYSTOLIC BLOOD PRESSURE: 133 MMHG | HEART RATE: 85 BPM | DIASTOLIC BLOOD PRESSURE: 79 MMHG | HEIGHT: 63 IN | BODY MASS INDEX: 29.59 KG/M2 | WEIGHT: 167 LBS

## 2024-05-20 DIAGNOSIS — Z96.611 STATUS POST TOTAL SHOULDER ARTHROPLASTY, RIGHT: Primary | ICD-10-CM

## 2024-05-20 DIAGNOSIS — Z96.611 STATUS POST TOTAL SHOULDER ARTHROPLASTY, RIGHT: ICD-10-CM

## 2024-05-20 PROCEDURE — 99024 POSTOP FOLLOW-UP VISIT: CPT | Performed by: ORTHOPAEDIC SURGERY

## 2024-05-20 PROCEDURE — 73030 X-RAY EXAM OF SHOULDER: CPT

## 2024-05-20 NOTE — PROGRESS NOTES
[facility] [place]  [address]  [place] [address]       Magali Stewart  [id number]  1955    ORTHOPAEDIC SURGERY OUTPATIENT NOTE  5/20/2024      HISTORY:  69 y.o. female  presents today 11 weeks. status post right shoulder anatomic total shoulder arthroplasty and biceps tenodesis from 1/2/2024. She is doing well. She is happy with progress. She has been doing to physical therapy twice a week. Her pain level is 1/10 and sane score is 90 %.     Past Medical History:   Diagnosis Date    Allergic 1970    Allergic rhinitis due to pollen 09/18/2020    Arthritis     hands    Asthma     Obesity     Sleep apnea     Uses CPAP       Past Surgical History:   Procedure Laterality Date    JOINT REPLACEMENT      KNEE SURGERY Bilateral     REPLACEMENT    IN ARTHROPLASTY GLENOHUMERAL JOINT TOTAL SHOULDER Right 2/28/2024    Procedure: ARTHROPLASTY SHOULDER- right anatomic,Biceps tenodesis.;  Surgeon: Neeru Lovell DO;  Location: [facility] OR;  Service: Orthopedics    TONSILLECTOMY      TUBAL LIGATION         Social History     Socioeconomic History    Marital status:      Spouse name: Not on file    Number of children: Not on file    Years of education: Not on file    Highest education level: Not on file   Occupational History    Occupation: RETIRED   Tobacco Use    Smoking status: Former     Current packs/day: 0.00     Average packs/day: 1 pack/day for 30.3 years (30.3 ttl pk-yrs)     Types: Cigarettes     Start date: 6/6/1978     Quit date: 9/18/2008     Years since quitting: 15.6    Smokeless tobacco: Never   Vaping Use    Vaping status: Never Used   Substance and Sexual Activity    Alcohol use: Yes     Alcohol/week: 2.0 standard drinks of alcohol     Types: 1 Cans of beer, 1 Standard drinks or equivalent per week    Drug use: Never    Sexual activity: Yes     Partners: Male     Birth control/protection: Surgical   Other Topics Concern    Not on file    Social History Narrative    · Do you currently or have you served in the [employer]:   No      · Were you activated, into active duty, as a member of the [employer] or as a Reservist:   No      · Exercise level:   None      · Diet:   Regular      · General stress level:   Low      · ·Caffeine intake:   Moderate      · Chewing tobacco:   none      · Seat belts used routinely:   Yes      · Sunscreen used routinely:   No      · Smoke alarm in home:   Yes      ·      Social Determinants of Health     Financial Resource Strain: Low Risk  (10/20/2023)    Overall Financial Resource Strain (CARDIA)     Difficulty of Paying Living Expenses: Not very hard   Food Insecurity: Not on file   Transportation Needs: No Transportation Needs (10/20/2023)    PRAPARE - Transportation     Lack of Transportation (Medical): No     Lack of Transportation (Non-Medical): No   Physical Activity: Not on file   Stress: Not on file   Social Connections: Not on file   Intimate Partner Violence: Not on file   Housing Stability: Not on file       Family History   Problem Relation Age of Onset    Other Mother         maintenance procedure for cardiac pacemaker system     Breast cancer Maternal Grandmother     Breast cancer Paternal Grandmother         Patient's Medications   New Prescriptions    No medications on file   Previous Medications    ELASTIC BANDAGES & SUPPORTS (SPLINT WRIST BRACE/LEFT-RIGHT) MISC    Use daily at bedtime    EZETIMIBE (ZETIA) 10 MG TABLET    Take 1 tablet (10 mg total) by mouth daily    MOMETASONE (NASONEX) 50 MCG/ACT NASAL SPRAY    USE 1 SPRAY IN EACH NOSTRIL TWICE A DAY    NAPROXEN (NAPROSYN) 250 MG TABLET    Take 250 mg by mouth 2 (two) times a day with meals    PHENTERMINE (ADIPEX-P) 37.5 MG TABLET    Take 0.5 tablets (18.75 mg total) by mouth in the morning   Modified Medications    No medications on file   Discontinued Medications    No medications on file       No Known Allergies     /79 (BP Location:  "Left arm, Patient Position: Sitting, Cuff Size: Standard)   Pulse 85   Ht 5' 3\" (1.6 m)   Wt 75.8 kg (167 lb)   BMI 29.58 kg/m²      REVIEW OF SYSTEMS:  Constitutional: Negative.    HEENT: Negative.    Respiratory: Negative.    Skin: Negative.    Neurological: Negative.    Psychiatric/Behavioral: Negative.  Musculoskeletal: Negative except for that mentioned in the HPI.    PHYSICAL EXAM:    RIGHT SHOULDER:    Appearance: Surgical incision well healed     Forward flexion:   170 degrees   Abduction:  90 degrees   External rotation at 0 degrees:   30 degrees   Internal rotation: T 12      STRENGTH:  Forward flexion:  4/5  Abduction:  5/5   External rotation:  4/5   Internal rotation:  5/5     \    Radial/median/ulnar nerve intact    <2 sec cap refill       IMAGING: 3 of the right shoulder was taken today in the office.  This was reviewed in PACS and demonstrates intact anatomic total shoulder arthroplasty in stable positioning without signs of loosening or failure.       ASSESSMENT AND PLAN:  69 y.o. female  11 weeeks tatus post right shoulder anatomic total shoulder arthroplasty and biceps tenodesis from 1/2/2024.     She is doing well. She is to continue with physical therapy working on strengthening. She is to follow up in 3 months and at time will repeat right shoulder XR.           Scribe Attestation      I,:  Laney Aquino am acting as a scribe while in the presence of the attending physician.:       I,:  Neeru Lovell DO personally performed the services described in this documentation    as scribed in my presence.:             "

## 2024-05-22 ENCOUNTER — OFFICE VISIT (OUTPATIENT)
Dept: PHYSICAL THERAPY | Facility: MEDICAL CENTER | Age: 69
End: 2024-05-22
Attending: PHYSICIAN ASSISTANT
Payer: MEDICARE

## 2024-05-22 DIAGNOSIS — Z96.611 STATUS POST TOTAL SHOULDER ARTHROPLASTY, RIGHT: Primary | ICD-10-CM

## 2024-05-22 PROCEDURE — 97112 NEUROMUSCULAR REEDUCATION: CPT

## 2024-05-22 PROCEDURE — 97110 THERAPEUTIC EXERCISES: CPT

## 2024-05-22 PROCEDURE — 97140 MANUAL THERAPY 1/> REGIONS: CPT

## 2024-05-22 NOTE — PROGRESS NOTES
"Daily Note     Today's date: 2024  Patient name: Magali Stewart  : 1955  MRN: [id number]  Referring provider: Rivas Conner PA-C  Dx:   Encounter Diagnosis     ICD-10-CM    1. Status post total shoulder arthroplasty, right  Z96.611             Eval/Re-Eval POC Expires Auth #/ Referral # Total Visits Start Date Expiration Date Extension Info Visits Limitation   3/13  Medicare - no auth  BOMN        BOMN                                                  1 2 3 4 5 6   3/13 3/18 3/20 3/25 3/27 4/1   7 8 9 10 11 12   4/3 4/8 4/10 4/15 4/17 4/22   13 14 15 16 17 18      19 20 21 22 23 24   5/15 5/22       25 26 27 28 29 30               Start Time: 1457  Stop Time: 1542  Total time in clinic (min): 45 minutes    Subjective: Patient stated that she saw her doctor today and it went well. She stated that she feels as though she has more movement but still some weakness. She stated that she wants to make next week her last week.      Objective: See treatment diary below      Assessment: Tolerated treatment well.  Patient able to complete all exercises without increase in pain. Patient progressed with resistance on exercises as tolerated. Patient demonstrated fatigue post treatment, exhibited good technique with therapeutic exercises, and would benefit from continued PT      Plan: Continue per plan of care.  Progress treament per protocol.      Precautions: standard precaution,   Past Medical History:   Diagnosis Date     1970    Allergic rhinitis due to pollen 2020    Arthritis     hands    Asthma     Obesity     Sleep apnea     Uses CPAP         PT 1:1 4446-6216  Manuals 4/22 4/24 4/29 5/1 5/6 5/8 5/13 5/15 5/22    PROM R Shoulder  IB  IB  IB IB IB IB    Jt Mobes             STM/Massage Gun                          Neuro Re-Ed             Scapular Retractions 20x 20x  20x 20x 20x  20x 20x    Reverse Shoulder Shrugs 20x 20x           Rhythmic Stabilization   30\" 3x        " "  Bicep Curls 20x RTB 20x GTB 20x BTB 20x BTB 20x BTB 20x Blk TB 20x Blk TB 20x Blk TB 20x grey tb    Triceps Extensions  20x GTB 20x BTB 20x BTB 20x BTB 20x Blk TB 20x Blk TB 20x Blk TB 20x grey tb    Cane AAROM 20x flex/abd 20x flex/abd, 20x flex supine 20x flex/abd, 20x flex/ER supine 20x flex/abd, 20x flex/ER supine 15x ea, 20x flex/ER supine 20x ea , supine flex 20x 1# Supine flex 20x 2# Supine flex 20x 3# Supine flex/ER 20x 3#    Shoulder AROM Supine flex 20x 1#, sidelying abd,20x 1#, ER, 15x, supine ER 15x Supine flex/ER 20x, sidelying abd 20x Supine flex 20x 1#, supine ER 20x, sidelying abd 20x 1#, sidelying ER 20x Supine flex 20x 2#, supine ER 20x, sidelying abd 20x 1#, sidelying ER 20x Supine flex 20x 2#, supine ER 20x, sidelying abd 20x 1#, sidelying ER 20x Supine flex 20x 2#,, sidelying ER 20x Supine flex 20x 3#, sidelying ER 20x Supine flex 20x 3# 20x ea 1#    TB GH Row 20x GTB 20x GTB 20x BTB 20x BTB 20x BTB 20x Blk TB 20x Blk TB 20x Blk TB 20x grey tb    TB GH Ext 20x GTB 20x GTB 20x BTB 20x BTB 20x BTB 20x Blk TB 20x Blk TB 20x Blk TB 20x grey tb    TB IR/ER 15x ea RTB 15x IR GTB, 5x IR YTB 20x IR GTB 20x IR GTB, 20 ER YTB 20x IR BTB, 20x ER YTB 20x IR BTB, 20x ER YTB 20x IR BTB, 20x ER RTB 20x IR BTB, 20x ER RTB 20x IR Blk  TB, 20x ER GTB    TB Step Outs  15x ER YTB 20x ER YTB 20x ER RTB 20x ER RTB 20xER RTB 20x ER GTB 20x ER GTB 20x ER BTB    Shoulder Raises  15x YTB fwd/lat 10x YTB fwd/lat 10x RTB fwd/lat         Ther Ex             Pulleys 7' 7' 7' 7' 7' 7' 7' 7' 7'    Finger Ladder 10x flex/abd 15x flex/abd 15x 5\" hold flex/abd 15x 5\" hold flex/abd 10x 5\" hold flex/abd 10x 5\" hold flex/ abd 10x 5\" hold flex/abd 10x 5\" hold flex/abd 10x 5\" hold flex/abd    Doorway Stretch   30\" 3x 30\" 3x 30\" 3x 30\" 4x 30\" 3x 30\" 3x 30\" 3x    GH Counter Stretch   30\" 3x 30\" 3x 10x 10\" hold 30\" 3x 30\" 4x 30\" 4x 30\" 4x    GH IR Stretch                                                                 Ther Activity     "                                   Gait Training                                       Modalities

## 2024-05-29 ENCOUNTER — OFFICE VISIT (OUTPATIENT)
Dept: PHYSICAL THERAPY | Facility: MEDICAL CENTER | Age: 69
End: 2024-05-29
Attending: PHYSICIAN ASSISTANT
Payer: MEDICARE

## 2024-05-29 DIAGNOSIS — Z96.611 STATUS POST TOTAL SHOULDER ARTHROPLASTY, RIGHT: Primary | ICD-10-CM

## 2024-05-29 PROCEDURE — 97140 MANUAL THERAPY 1/> REGIONS: CPT

## 2024-05-29 PROCEDURE — 97112 NEUROMUSCULAR REEDUCATION: CPT

## 2024-05-29 PROCEDURE — 97110 THERAPEUTIC EXERCISES: CPT

## 2024-05-29 NOTE — PROGRESS NOTES
"Daily Note     Today's date: 2024  Patient name: Magali Stewart  : 1955  MRN: [id number]  Referring provider: Rivas Conner PA-C  Dx:   Encounter Diagnosis     ICD-10-CM    1. Status post total shoulder arthroplasty, right  Z96.611             Eval/Re-Eval POC Expires Auth #/ Referral # Total Visits Start Date Expiration Date Extension Info Visits Limitation   3/13  Medicare - no auth  BOMN        BOMN                                                  1 2 3 4 5 6   3/13 3/18 3/20 3/25 3/27 4/1   7 8 9 10 11 12   4/3 4/8 4/10 4/15 4/17 4/22   13 14 15 16 17 18      19 20 21 22 23 24   5/15 5/22 5/29      25 26 27 28 29 30               Start Time: 1458  Stop Time: 1536  Total time in clinic (min): 38 minutes    Subjective: Patient stated that her shoulder is feeling good today.      Objective: See treatment diary below      Assessment: Tolerated treatment well.  Patient able to increase weight withshoulder raises today. Patient given lat stretch today, tolerated well. Patient demonstrated fatigue post treatment, exhibited good technique with therapeutic exercises, and would benefit from continued PT      Plan: Continue per plan of care.  Progress treament per protocol.      Precautions: standard precaution,   Past Medical History:   Diagnosis Date     1970    Allergic rhinitis due to pollen 2020    Arthritis     hands    Asthma     Obesity     Sleep apnea     Uses CPAP         PT 1:1 entire time  Manuals 4/22 4/24 4/29 5/1 5/6 5/8 5/13 5/15 5/22 5/29   PROM R Shoulder  IB  IB  IB IB IB IB IB   Jt Mobes             STM/Massage Gun                          Neuro Re-Ed             Scapular Retractions 20x 20x  20x 20x 20x  20x 20x 20x   Reverse Shoulder Shrugs 20x 20x           Rhythmic Stabilization   30\" 3x          Bicep Curls 20x RTB 20x GTB 20x BTB 20x BTB 20x BTB 20x Blk TB 20x Blk TB 20x Blk TB 20x grey tb 20x grey tb   Triceps Extensions  20x GTB 20x BTB 20x " "BTB 20x BTB 20x Blk TB 20x Blk TB 20x Blk TB 20x grey tb 20x grey tb   Cane AAROM 20x flex/abd 20x flex/abd, 20x flex supine 20x flex/abd, 20x flex/ER supine 20x flex/abd, 20x flex/ER supine 15x ea, 20x flex/ER supine 20x ea , supine flex 20x 1# Supine flex 20x 2# Supine flex 20x 3# Supine flex/ER 20x 3# Supine flex 20x 4#   Shoulder AROM Supine flex 20x 1#, sidelying abd,20x 1#, ER, 15x, supine ER 15x Supine flex/ER 20x, sidelying abd 20x Supine flex 20x 1#, supine ER 20x, sidelying abd 20x 1#, sidelying ER 20x Supine flex 20x 2#, supine ER 20x, sidelying abd 20x 1#, sidelying ER 20x Supine flex 20x 2#, supine ER 20x, sidelying abd 20x 1#, sidelying ER 20x Supine flex 20x 2#,, sidelying ER 20x Supine flex 20x 3#, sidelying ER 20x Supine flex 20x 3# 20x ea 1# 20x ea 2#   TB GH Row 20x GTB 20x GTB 20x BTB 20x BTB 20x BTB 20x Blk TB 20x Blk TB 20x Blk TB 20x grey tb 20x grey tb   TB GH Ext 20x GTB 20x GTB 20x BTB 20x BTB 20x BTB 20x Blk TB 20x Blk TB 20x Blk TB 20x grey tb 20x grey tb   TB Horizontal Abduction          20x GTB   TB IR/ER 15x ea RTB 15x IR GTB, 5x IR YTB 20x IR GTB 20x IR GTB, 20 ER YTB 20x IR BTB, 20x ER YTB 20x IR BTB, 20x ER YTB 20x IR BTB, 20x ER RTB 20x IR BTB, 20x ER RTB 20x IR Blk  TB, 20x ER GTB 20x IR Blk  TB, 20x ER GTB   TB Step Outs  15x ER YTB 20x ER YTB 20x ER RTB 20x ER RTB 20xER RTB 20x ER GTB 20x ER GTB 20x ER BTB 20x ER BTB   Shoulder Raises  15x YTB fwd/lat 10x YTB fwd/lat 10x RTB fwd/lat         Ther Ex             Pulleys 7' 7' 7' 7' 7' 7' 7' 7' 7' 7'   Finger Ladder 10x flex/abd 15x flex/abd 15x 5\" hold flex/abd 15x 5\" hold flex/abd 10x 5\" hold flex/abd 10x 5\" hold flex/ abd 10x 5\" hold flex/abd 10x 5\" hold flex/abd 10x 5\" hold flex/abd 10x 5\" hold flex/abd   Doorway Stretch   30\" 3x 30\" 3x 30\" 3x 30\" 4x 30\" 3x 30\" 3x 30\" 3x 30\" 3x   GH Counter Stretch   30\" 3x 30\" 3x 10x 10\" hold 30\" 3x 30\" 4x 30\" 4x 30\" 4x 30' 4x   GH IR Stretch          30\" 3x   Lat Stretch          30\" 3x      "                                     Ther Activity                                       Gait Training                                       Modalities

## 2024-05-30 ENCOUNTER — OFFICE VISIT (OUTPATIENT)
Dept: PHYSICAL THERAPY | Facility: MEDICAL CENTER | Age: 69
End: 2024-05-30
Attending: PHYSICIAN ASSISTANT
Payer: MEDICARE

## 2024-05-30 DIAGNOSIS — Z96.611 STATUS POST TOTAL SHOULDER ARTHROPLASTY, RIGHT: Primary | ICD-10-CM

## 2024-05-30 PROCEDURE — 97110 THERAPEUTIC EXERCISES: CPT

## 2024-05-30 PROCEDURE — 97112 NEUROMUSCULAR REEDUCATION: CPT

## 2024-05-30 NOTE — PROGRESS NOTES
Daily Note / Discharge    Today's date: 2024  Patient name: Magali Stewart  : 1955  MRN: [id number]  Referring provider: Rivas Conner PA-C  Dx:   Encounter Diagnosis     ICD-10-CM    1. Status post total shoulder arthroplasty, right  Z96.611             Eval/Re-Eval POC Expires Auth #/ Referral # Total Visits Start Date Expiration Date Extension Info Visits Limitation   3/13  Medicare - no auth  BOMN        BOMN                                                  1 2 3 4 5 6   3/13 3/18 3/20 3/25 3/27 4/1   7 8 9 10 11 12   4/3 4/8 4/10 4/15 4/17 4/22   13 14 15 16 17 18      19 20 21 22 23 24   5/15 5/22 5/29 5/30     25 26 27 28 29 30               Start Time: 1230  Stop Time: 1320  Total time in clinic (min): 50 minutes    Subjective: Patient stated that her shoulder is feeling good. She stated that she wants to make today her last day.      Objective: See treatment diary below      Assessment: Tolerated treatment well.  Patient wants to make today her last day. Patient given updated HEP to reflect current level of function. Patient educated on keeping up with exercises at home, verbalized understanding. All other questions and concerns addressed. Patient demonstrated fatigue post treatment, exhibited good technique with therapeutic exercises, and would benefit from continued PT      Plan: D/C to home with updated HEP     Updated HEP:  Access Code: [email or web address]  URL: [email or web address]  Date: 2024  Prepared by: Sen Heller    Exercises  - Seated Shoulder Shrug Circles AROM Backward  - 2 x daily - 7 x weekly - 2 sets - 10 reps  - Seated Scapular Retraction  - 2 x daily - 7 x weekly - 2 sets - 10 reps  - Seated Cervical Retraction  - 2 x daily - 7 x weekly - 2 sets - 10 reps  - Seated Levator Scapulae Stretch  - 2 x daily - 7 x weekly - 1 sets - 3 reps - 30s hold  - Seated Upper Trapezius Stretch  - 2 x daily - 7 x weekly - 1 sets - 3 reps - 30s hold  -  Seated Thoracic Lumbar Extension with Pectoralis Stretch  - 2 x daily - 7 x weekly - 2 sets - 10 reps  - Doorway Pec Stretch at 90 Degrees Abduction  - 2 x daily - 7 x weekly - 1 sets - 3 reps - 30s hold  - Chest and Bicep Stretch - Arms Behind Back  - 2 x daily - 7 x weekly - 1 sets - 3 reps - 30s hold  - Standing Shoulder Internal Rotation Stretch with Towel  - 2 x daily - 7 x weekly - 1 sets - 3 reps - 30s hold  - Standing Shoulder Row with Anchored Resistance  - 2 x daily - 7 x weekly - 2 sets - 10 reps  - Shoulder extension with resistance - Neutral  - 2 x daily - 7 x weekly - 2 sets - 10 reps  - Standing Isometric Shoulder Flexion with Doorway - Arm Bent  - 2 x daily - 7 x weekly - 2 sets - 5 reps - 5s hold  - Standing Isometric Shoulder Internal Rotation at Doorway  - 2 x daily - 7 x weekly - 2 sets - 5 reps - 5s hold  - Standing Isometric Shoulder External Rotation with Doorway  - 2 x daily - 7 x weekly - 2 sets - 5 reps - 5s hold  - Standing Isometric Shoulder Abduction with Doorway  - 2 x daily - 7 x weekly - 2 sets - 5 reps - 5s hold  - Isometric Shoulder Extension at Wall  - 2 x daily - 7 x weekly - 2 sets - 5 reps - 5s hold  - Shoulder External Rotation with Anchored Resistance  - 2 x daily - 7 x weekly - 2 sets - 10 reps  - Shoulder Internal Rotation with Resistance  - 2 x daily - 7 x weekly - 2 sets - 10 reps  - Standing shoulder flexion wall slides  - 2 x daily - 7 x weekly - 2 sets - 10 reps  - Supine Shoulder Flexion Extension AAROM with Dowel  - 2 x daily - 7 x weekly - 2 sets - 10 reps - 5s hold  - Supine Single Arm Shoulder Protraction  - 2 x daily - 7 x weekly - 2 sets - 10 reps  - Sidelying Shoulder External Rotation  - 2 x daily - 7 x weekly - 2 sets - 10 reps  - Prone Shoulder Extension - Single Arm  - 2 x daily - 7 x weekly - 2 sets - 10 reps  - Prone Shoulder Horizontal Abduction  - 2 x daily - 7 x weekly - 2 sets - 10 reps  - Prone Single Arm Shoulder Y  - 2 x daily - 7 x weekly - 2 sets  "- 10 reps  - Prone Shoulder Row  - 2 x daily - 7 x weekly - 2 sets - 10 reps  - Sidelying Open Book Thoracic Lumbar Rotation and Extension  - 2 x daily - 7 x weekly - 2 sets - 10 reps    Precautions: standard precaution,   Past Medical History:   Diagnosis Date    Allergic 1970    Allergic rhinitis due to pollen 09/18/2020    Arthritis     hands    Asthma     Obesity     Sleep apnea     Uses CPAP         PT 1:1 [id number]  Manuals 5/30 5/22 5/29   PROM R Shoulder         IB IB   Jt Mobes             STM/Massage Gun                          Neuro Re-Ed             Scapular Retractions         20x 20x   Bicep Curls 20x grey tb        20x grey tb 20x grey tb   Triceps Extensions 20x grey tb        20x grey tb 20x grey tb   Cane AAROM Supine flex 20x 4#        Supine flex/ER 20x 3# Supine flex 20x 4#   Shoulder AROM 20x ea 2#        20x ea 1# 20x ea 2#   TB GH Row 20x grey tb        20x grey tb 20x grey tb   TB GH Ext 20x grey tb        20x grey tb 20x grey tb   TB Horizontal Abduction          20x GTB   TB IR/ER 20x IR blk TB, 20x ER GTB        20x IR Blk  TB, 20x ER GTB 20x IR Blk  TB, 20x ER GTB   TB Step Outs 20x ER BTB        20x ER BTB 20x ER BTB   Ther Ex             Pulleys 7'        7' 7'   Finger Ladder 10x 5\" hold flex/abd        10x 5\" hold flex/abd 10x 5\" hold flex/abd   Doorway Stretch 30\" 3x        30\" 3x 30\" 3x   Biceps Doorway Stretch 30\" 3x            GH Counter Stretch         30\" 4x 30' 4x   GH IR Stretch 30\" 3x         30\" 3x   Lat Stretch          30\" 3x   Wall Slides 10x                                      Ther Activity                                       Gait Training                                       Modalities                                         "

## 2024-08-26 ENCOUNTER — APPOINTMENT (OUTPATIENT)
Dept: RADIOLOGY | Facility: MEDICAL CENTER | Age: 69
End: 2024-08-26
Payer: MEDICARE

## 2024-08-26 ENCOUNTER — OFFICE VISIT (OUTPATIENT)
Dept: OBGYN CLINIC | Facility: MEDICAL CENTER | Age: 69
End: 2024-08-26
Payer: MEDICARE

## 2024-08-26 VITALS
HEIGHT: 63 IN | HEART RATE: 91 BPM | DIASTOLIC BLOOD PRESSURE: 84 MMHG | WEIGHT: 167 LBS | SYSTOLIC BLOOD PRESSURE: 175 MMHG | BODY MASS INDEX: 29.59 KG/M2

## 2024-08-26 DIAGNOSIS — M25.511 CHRONIC RIGHT SHOULDER PAIN: ICD-10-CM

## 2024-08-26 DIAGNOSIS — G89.29 CHRONIC RIGHT SHOULDER PAIN: ICD-10-CM

## 2024-08-26 DIAGNOSIS — G89.29 CHRONIC RIGHT SHOULDER PAIN: Primary | ICD-10-CM

## 2024-08-26 DIAGNOSIS — M25.511 CHRONIC RIGHT SHOULDER PAIN: Primary | ICD-10-CM

## 2024-08-26 PROCEDURE — 99213 OFFICE O/P EST LOW 20 MIN: CPT | Performed by: ORTHOPAEDIC SURGERY

## 2024-08-26 PROCEDURE — 73030 X-RAY EXAM OF SHOULDER: CPT

## 2024-08-26 NOTE — PROGRESS NOTES
[facility] [place]  [address]  [place] [address]       Magali Stewart  [id number]  1955    ORTHOPAEDIC SURGERY OUTPATIENT NOTE  8/26/2024      HISTORY:  69 y.o. female presents today 6 months status post right shoulder anatomic total shoulder arthroplasty and biceps tendon nieces from 2/28/24.     Past Medical History:   Diagnosis Date    Allergic 1970    Allergic rhinitis due to pollen 09/18/2020    Arthritis     hands    Asthma     Obesity     Sleep apnea     Uses CPAP       Past Surgical History:   Procedure Laterality Date    JOINT REPLACEMENT      KNEE SURGERY Bilateral     REPLACEMENT    [place] ARTHROPLASTY GLENOHUMERAL JOINT TOTAL SHOULDER Right 2/28/2024    Procedure: ARTHROPLASTY SHOULDER- right anatomic,Biceps tenodesis.;  Surgeon: Neeru Lovell DO;  Location:  MAIN OR;  Service: Orthopedics    TONSILLECTOMY      TUBAL LIGATION         Social History     Socioeconomic History    Marital status:      Spouse name: Not on file    Number of children: Not on file    Years of education: Not on file    Highest education level: Not on file   Occupational History    Occupation: RETIRED   Tobacco Use    Smoking status: Former     Current packs/day: 0.00     Average packs/day: 1 pack/day for 30.3 years (30.3 ttl pk-yrs)     Types: Cigarettes     Start date: 6/6/1978     Quit date: 9/18/2008     Years since quitting: 15.9    Smokeless tobacco: Never   Vaping Use    Vaping status: Never Used   Substance and Sexual Activity    Alcohol use: Yes     Alcohol/week: 2.0 standard drinks of alcohol     Types: 1 Cans of beer, 1 Standard drinks or equivalent per week    Drug use: Never    Sexual activity: Yes     Partners: Male     Birth control/protection: Surgical   Other Topics Concern    Not on file   Social History Narrative    · Do you currently or have you served in the [employer]:   No      · Were you activated, into active duty, as a  "member of the [employer] or as a Reservist:   No      · Exercise level:   None      · Diet:   Regular      · General stress level:   Low      · ·Caffeine intake:   Moderate      · Chewing tobacco:   none      · Seat belts used routinely:   Yes      · Sunscreen used routinely:   No      · Smoke alarm in home:   Yes      ·      Social Determinants of Health     Financial Resource Strain: Low Risk  (10/20/2023)    Overall Financial Resource Strain (CARDIA)     Difficulty of Paying Living Expenses: Not very hard   Food Insecurity: Not on file   Transportation Needs: No Transportation Needs (10/20/2023)    PRAPARE - Transportation     Lack of Transportation (Medical): No     Lack of Transportation (Non-Medical): No   Physical Activity: Not on file   Stress: Not on file   Social Connections: Not on file   Intimate Partner Violence: Not on file   Housing Stability: Not on file       Family History   Problem Relation Age of Onset    Other Mother         maintenance procedure for cardiac pacemaker system     Breast cancer Maternal Grandmother     Breast cancer Paternal Grandmother         Patient's Medications   New Prescriptions    No medications on file   Previous Medications    ELASTIC BANDAGES & SUPPORTS (SPLINT WRIST BRACE/LEFT-RIGHT) MISC    Use daily at bedtime    EZETIMIBE (ZETIA) 10 MG TABLET    Take 1 tablet (10 mg total) by mouth daily    MOMETASONE (NASONEX) 50 MCG/ACT NASAL SPRAY    USE 1 SPRAY IN EACH NOSTRIL TWICE A DAY    NAPROXEN (NAPROSYN) 250 MG TABLET    Take 250 mg by mouth 2 (two) times a day with meals    PHENTERMINE (ADIPEX-P) 37.5 MG TABLET    Take 0.5 tablets (18.75 mg total) by mouth in the morning   Modified Medications    No medications on file   Discontinued Medications    No medications on file       No Known Allergies     BP (!) 175/84   Pulse 91   Ht 5' 3\" (1.6 m)   Wt 75.8 kg (167 lb)   BMI 29.58 kg/m²      REVIEW OF SYSTEMS:  Constitutional: Negative.    HEENT: Negative.  "   Respiratory: Negative.    Skin: Negative.    Neurological: Negative.    Psychiatric/Behavioral: Negative.  Musculoskeletal: Negative except for that mentioned in the HPI.    @[email or web address]@     PHYSICAL EXAM:  ***    IMAGING:  ***    ASSESSMENT AND PLAN:  69 y.o. female  ***

## 2024-08-26 NOTE — PROGRESS NOTES
[facility] [place]  [address]  [place] [address]       Magali Stewart  [id number]  1955    ORTHOPAEDIC SURGERY OUTPATIENT NOTE  8/26/2024      HISTORY:  69 y.o. female  presents for 6-month follow-up right anatomic total shoulder arthroplasty.  Patient's visual analog score 0-10.  Her SANE score is 95%.  No issues since we last saw her.  She is doing very well.    Past Medical History:   Diagnosis Date    Allergic 1970    Allergic rhinitis due to pollen 09/18/2020    Arthritis     hands    Asthma     Obesity     Sleep apnea     Uses CPAP       Past Surgical History:   Procedure Laterality Date    JOINT REPLACEMENT      KNEE SURGERY Bilateral     REPLACEMENT    MS ARTHROPLASTY GLENOHUMERAL JOINT TOTAL SHOULDER Right 2/28/2024    Procedure: ARTHROPLASTY SHOULDER- right anatomic,Biceps tenodesis.;  Surgeon: Neeru Lovell DO;  Location: [facility];  Service: Orthopedics    TONSILLECTOMY      TUBAL LIGATION         Social History     Socioeconomic History    Marital status:      Spouse name: Not on file    Number of children: Not on file    Years of education: Not on file    Highest education level: Not on file   Occupational History    Occupation: RETIRED   Tobacco Use    Smoking status: Former     Current packs/day: 0.00     Average packs/day: 1 pack/day for 30.3 years (30.3 ttl pk-yrs)     Types: Cigarettes     Start date: 6/6/1978     Quit date: 9/18/2008     Years since quitting: 15.9    Smokeless tobacco: Never   Vaping Use    Vaping status: Never Used   Substance and Sexual Activity    Alcohol use: Yes     Alcohol/week: 2.0 standard drinks of alcohol     Types: 1 Cans of beer, 1 Standard drinks or equivalent per week    Drug use: Never    Sexual activity: Yes     Partners: Male     Birth control/protection: Surgical   Other Topics Concern    Not on file   Social History Narrative    · Do you currently or have you served in the [place] Armed  "Forces:   No      · Were you activated, into active duty, as a member of the [employer] or as a Reservist:   No      · Exercise level:   None      · Diet:   Regular      · General stress level:   Low      · ·Caffeine intake:   Moderate      · Chewing tobacco:   none      · Seat belts used routinely:   Yes      · Sunscreen used routinely:   No      · Smoke alarm in home:   Yes      ·      Social Determinants of Health     Financial Resource Strain: Low Risk  (10/20/2023)    Overall Financial Resource Strain (CARDIA)     Difficulty of Paying Living Expenses: Not very hard   Food Insecurity: Not on file   Transportation Needs: No Transportation Needs (10/20/2023)    PRAPARE - Transportation     Lack of Transportation (Medical): No     Lack of Transportation (Non-Medical): No   Physical Activity: Not on file   Stress: Not on file   Social Connections: Not on file   Intimate Partner Violence: Not on file   Housing Stability: Not on file       Family History   Problem Relation Age of Onset    Other Mother         maintenance procedure for cardiac pacemaker system     Breast cancer Maternal Grandmother     Breast cancer Paternal Grandmother         Patient's Medications   New Prescriptions    No medications on file   Previous Medications    ELASTIC BANDAGES & SUPPORTS (SPLINT WRIST BRACE/LEFT-RIGHT) MISC    Use daily at bedtime    EZETIMIBE (ZETIA) 10 MG TABLET    Take 1 tablet (10 mg total) by mouth daily    MOMETASONE (NASONEX) 50 MCG/ACT NASAL SPRAY    USE 1 SPRAY IN EACH NOSTRIL TWICE A DAY    NAPROXEN (NAPROSYN) 250 MG TABLET    Take 250 mg by mouth 2 (two) times a day with meals    PHENTERMINE (ADIPEX-P) 37.5 MG TABLET    Take 0.5 tablets (18.75 mg total) by mouth in the morning   Modified Medications    No medications on file   Discontinued Medications    No medications on file       No Known Allergies     BP (!) 175/84   Pulse 91   Ht 5' 3\" (1.6 m)   Wt 75.8 kg (167 lb)   BMI 29.58 kg/m²      REVIEW OF " SYSTEMS:  Constitutional: Negative.    HEENT: Negative.    Respiratory: Negative.    Skin: Negative.    Neurological: Negative.    Psychiatric/Behavioral: Negative.  Musculoskeletal: Negative except for that mentioned in the HPI.    [unfilled]     PHYSICAL EXAM: Right shoulder: Forward elevation 170 degrees.  External  at the side 40 degrees.  Abduction 90 degrees.  Internal Tatian to T12.  Strength 4+ out of 5 for elevation.  5 out of 5 in all other ranges of motion.    IMAGING: X-ray right shoulder: Status post anatomic total shoulder arthroplasty with implant in good position and no signs of implant failure.    ASSESSMENT AND PLAN:  69 y.o. female 6-month status post right anatomic shoulder arthroplasty.    Overall patient doing excellent.  She is to follow-up in 6 months for repeat clinical evaluation and x-ray.

## 2024-10-24 ENCOUNTER — APPOINTMENT (OUTPATIENT)
Dept: LAB | Facility: MEDICAL CENTER | Age: 69
End: 2024-10-24
Payer: MEDICARE

## 2024-10-24 DIAGNOSIS — E78.2 MIXED HYPERLIPIDEMIA: ICD-10-CM

## 2024-10-24 LAB
CHOLEST SERPL-MCNC: 201 MG/DL
HDLC SERPL-MCNC: 60 MG/DL
LDLC SERPL CALC-MCNC: 122 MG/DL (ref 0–100)
TRIGL SERPL-MCNC: 93 MG/DL

## 2024-10-24 PROCEDURE — 36415 COLL VENOUS BLD VENIPUNCTURE: CPT

## 2024-10-24 PROCEDURE — 80061 LIPID PANEL: CPT

## 2024-10-30 ENCOUNTER — OFFICE VISIT (OUTPATIENT)
Dept: FAMILY MEDICINE CLINIC | Facility: CLINIC | Age: 69
End: 2024-10-30
Payer: MEDICARE

## 2024-10-30 VITALS
TEMPERATURE: 97.3 F | OXYGEN SATURATION: 95 % | DIASTOLIC BLOOD PRESSURE: 84 MMHG | BODY MASS INDEX: 28.7 KG/M2 | SYSTOLIC BLOOD PRESSURE: 144 MMHG | HEIGHT: 63 IN | HEART RATE: 99 BPM | WEIGHT: 162 LBS

## 2024-10-30 DIAGNOSIS — J30.1 ALLERGIC RHINITIS DUE TO POLLEN, UNSPECIFIED SEASONALITY: ICD-10-CM

## 2024-10-30 DIAGNOSIS — G93.31 POSTVIRAL FATIGUE SYNDROME: ICD-10-CM

## 2024-10-30 DIAGNOSIS — E78.2 MIXED HYPERLIPIDEMIA: ICD-10-CM

## 2024-10-30 DIAGNOSIS — Z23 ENCOUNTER FOR IMMUNIZATION: ICD-10-CM

## 2024-10-30 DIAGNOSIS — Z00.00 WELL ADULT EXAM: Primary | ICD-10-CM

## 2024-10-30 DIAGNOSIS — F32.1 CURRENT MODERATE EPISODE OF MAJOR DEPRESSIVE DISORDER WITHOUT PRIOR EPISODE (HCC): ICD-10-CM

## 2024-10-30 DIAGNOSIS — R53.82 CHRONIC FATIGUE: ICD-10-CM

## 2024-10-30 PROCEDURE — 99214 OFFICE O/P EST MOD 30 MIN: CPT | Performed by: FAMILY MEDICINE

## 2024-10-30 PROCEDURE — G0008 ADMIN INFLUENZA VIRUS VAC: HCPCS | Performed by: FAMILY MEDICINE

## 2024-10-30 PROCEDURE — 90662 IIV NO PRSV INCREASED AG IM: CPT | Performed by: FAMILY MEDICINE

## 2024-10-30 PROCEDURE — G0439 PPPS, SUBSEQ VISIT: HCPCS | Performed by: FAMILY MEDICINE

## 2024-10-30 RX ORDER — PHENTERMINE HYDROCHLORIDE 37.5 MG/1
18.75 TABLET ORAL DAILY
Qty: 45 TABLET | Refills: 1 | Status: SHIPPED | OUTPATIENT
Start: 2024-10-30

## 2024-10-30 RX ORDER — ESCITALOPRAM OXALATE 10 MG/1
10 TABLET ORAL DAILY
Qty: 30 TABLET | Refills: 1 | Status: SHIPPED | OUTPATIENT
Start: 2024-10-30

## 2024-10-30 NOTE — PATIENT INSTRUCTIONS
Medicare Preventive Visit Patient Instructions  Thank you for completing your Welcome to Medicare Visit or Medicare Annual Wellness Visit today. Your next wellness visit will be due in one year (10/31/2025).  The screening/preventive services that you may require over the next 5-10 years are detailed below. Some tests may not apply to you based off risk factors and/or age. Screening tests ordered at today's visit but not completed yet may show as past due. Also, please note that scanned in results may not display below.  Preventive Screenings:  Service Recommendations Previous Testing/Comments   Colorectal Cancer Screening  * Colonoscopy    * Fecal Occult Blood Test (FOBT)/Fecal Immunochemical Test (FIT)  * Fecal DNA/Cologuard Test  * Flexible Sigmoidoscopy Age: 45-75 years old   Colonoscopy: every 10 years (may be performed more frequently if at higher risk)  OR  FOBT/FIT: every 1 year  OR  Cologuard: every 3 years  OR  Sigmoidoscopy: every 5 years  Screening may be recommended earlier than age 45 if at higher risk for colorectal cancer. Also, an individualized decision between you and your healthcare provider will decide whether screening between the ages of 76-85 would be appropriate. Colonoscopy: Not on file  FOBT/FIT: Not on file  Cologuard: Not on file  Sigmoidoscopy: Not on file          Breast Cancer Screening Age: 40+ years old  Frequency: every 1-2 years  Not required if history of left and right mastectomy Mammogram: Not on file        Cervical Cancer Screening Between the ages of 21-29, pap smear recommended once every 3 years.   Between the ages of 30-65, can perform pap smear with HPV co-testing every 5 years.   Recommendations may differ for women with a history of total hysterectomy, cervical cancer, or abnormal pap smears in past. Pap Smear: Not on file    Screening Not Indicated   Hepatitis C Screening Once for adults born between 1945 and 1965  More frequently in patients at high risk for  Hepatitis C Hep C Antibody: 05/06/2021    Screening Current   Diabetes Screening 1-2 times per year if you're at risk for diabetes or have pre-diabetes Fasting glucose: 76 mg/dL (4/15/2024)  A1C: 5.9 % (2/10/2024)  Screening Current   Cholesterol Screening Once every 5 years if you don't have a lipid disorder. May order more often based on risk factors. Lipid panel: 10/24/2024    Screening Not Indicated  History Lipid Disorder     Other Preventive Screenings Covered by Medicare:  Abdominal Aortic Aneurysm (AAA) Screening: covered once if your at risk. You're considered to be at risk if you have a family history of AAA.  Lung Cancer Screening: covers low dose CT scan once per year if you meet all of the following conditions: (1) Age 55-77; (2) No signs or symptoms of lung cancer; (3) Current smoker or have quit smoking within the last 15 years; (4) You have a tobacco smoking history of at least 20 pack years (packs per day multiplied by number of years you smoked); (5) You get a written order from a healthcare provider.  Glaucoma Screening: covered annually if you're considered high risk: (1) You have diabetes OR (2) Family history of glaucoma OR (3)  aged 50 and older OR (4)  American aged 65 and older  Osteoporosis Screening: covered every 2 years if you meet one of the following conditions: (1) You're estrogen deficient and at risk for osteoporosis based off medical history and other findings; (2) Have a vertebral abnormality; (3) On glucocorticoid therapy for more than 3 months; (4) Have primary hyperparathyroidism; (5) On osteoporosis medications and need to assess response to drug therapy.   Last bone density test (DXA Scan): Not on file.  HIV Screening: covered annually if you're between the age of 15-65. Also covered annually if you are younger than 15 and older than 65 with risk factors for HIV infection. For pregnant patients, it is covered up to 3 times per  pregnancy.    Immunizations:  Immunization Recommendations   Influenza Vaccine Annual influenza vaccination during flu season is recommended for all persons aged >= 6 months who do not have contraindications   Pneumococcal Vaccine   * Pneumococcal conjugate vaccine = PCV13 (Prevnar 13), PCV15 (Vaxneuvance), PCV20 (Prevnar 20)  * Pneumococcal polysaccharide vaccine = PPSV23 (Pneumovax) Adults 19-63 yo with certain risk factors or if 65+ yo  If never received any pneumonia vaccine: recommend Prevnar 20 (PCV20)  Give PCV20 if previously received 1 dose of PCV13 or PPSV23   Hepatitis B Vaccine 3 dose series if at intermediate or high risk (ex: diabetes, end stage renal disease, liver disease)   Respiratory syncytial virus (RSV) Vaccine - COVERED BY MEDICARE PART D  * RSVPreF3 (Arexvy) CDC recommends that adults 60 years of age and older may receive a single dose of RSV vaccine using shared clinical decision-making (SCDM)   Tetanus (Td) Vaccine - COST NOT COVERED BY MEDICARE PART B Following completion of primary series, a booster dose should be given every 10 years to maintain immunity against tetanus. Td may also be given as tetanus wound prophylaxis.   Tdap Vaccine - COST NOT COVERED BY MEDICARE PART B Recommended at least once for all adults. For pregnant patients, recommended with each pregnancy.   Shingles Vaccine (Shingrix) - COST NOT COVERED BY MEDICARE PART B  2 shot series recommended in those 19 years and older who have or will have weakened immune systems or those 50 years and older     Health Maintenance Due:      Topic Date Due   • Breast Cancer Screening: Mammogram  Never done   • Colorectal Cancer Screening  Never done   • Hepatitis C Screening  Completed     Immunizations Due:      Topic Date Due   • Influenza Vaccine (1) 09/01/2024   • COVID-19 Vaccine (4 - 2023-24 season) 09/01/2024     Advance Directives   What are advance directives?  Advance directives are legal documents that state your wishes and  plans for medical care. These plans are made ahead of time in case you lose your ability to make decisions for yourself. Advance directives can apply to any medical decision, such as the treatments you want, and if you want to donate organs.   What are the types of advance directives?  There are many types of advance directives, and each state has rules about how to use them. You may choose a combination of any of the following:  Living will:  This is a written record of the treatment you want. You can also choose which treatments you do not want, which to limit, and which to stop at a certain time. This includes surgery, medicine, IV fluid, and tube feedings.   Durable power of  for healthcare (DPAHC):  This is a written record that states who you want to make healthcare choices for you when you are unable to make them for yourself. This person, called a proxy, is usually a family member or a friend. You may choose more than 1 proxy.  Do not resuscitate (DNR) order:  A DNR order is used in case your heart stops beating or you stop breathing. It is a request not to have certain forms of treatment, such as CPR. A DNR order may be included in other types of advance directives.  Medical directive:  This covers the care that you want if you are in a coma, near death, or unable to make decisions for yourself. You can list the treatments you want for each condition. Treatment may include pain medicine, surgery, blood transfusions, dialysis, IV or tube feedings, and a ventilator (breathing machine).  Values history:  This document has questions about your views, beliefs, and how you feel and think about life. This information can help others choose the care that you would choose.  Why are advance directives important?  An advance directive helps you control your care. Although spoken wishes may be used, it is better to have your wishes written down. Spoken wishes can be misunderstood, or not followed. Treatments  may be given even if you do not want them. An advance directive may make it easier for your family to make difficult choices about your care.   Urinary Incontinence   Urinary incontinence (UI)  is when you lose control of your bladder. UI develops because your bladder cannot store or empty urine properly. The 3 most common types of UI are stress incontinence, urge incontinence, or both.  Medicines:   May be given to help strengthen your bladder control. Report any side effects of medication to your healthcare provider.  Do pelvic muscle exercises often:  Your pelvic muscles help you stop urinating. Squeeze these muscles tight for 5 seconds, then relax for 5 seconds. Gradually work up to squeezing for 10 seconds. Do 3 sets of 15 repetitions a day, or as directed. This will help strengthen your pelvic muscles and improve bladder control.  Train your bladder:  Go to the bathroom at set times, such as every 2 hours, even if you do not feel the urge to go. You can also try to hold your urine when you feel the urge to go. For example, hold your urine for 5 minutes when you feel the urge to go. As that becomes easier, hold your urine for 10 minutes.   Self-care:   Keep a UI record.  Write down how often you leak urine and how much you leak. Make a note of what you were doing when you leaked urine.  Drink liquids as directed. You may need to limit the amount of liquid you drink to help control your urine leakage. Do not drink any liquid right before you go to bed. Limit or do not have drinks that contain caffeine or alcohol.   Prevent constipation.  Eat a variety of high-fiber foods. Good examples are high-fiber cereals, beans, vegetables, and whole-grain breads. Walking is the best way to trigger your intestines to have a bowel movement.  Exercise regularly and maintain a healthy weight.  Weight loss and exercise will decrease pressure on your bladder and help you control your leakage.   Use a catheter as directed  to help  empty your bladder. A catheter is a tiny, plastic tube that is put into your bladder to drain your urine.   Go to behavior therapy as directed.  Behavior therapy may be used to help you learn to control your urge to urinate.    Weight Management   Why it is important to manage your weight:  Being overweight increases your risk of health conditions such as heart disease, high blood pressure, type 2 diabetes, and certain types of cancer. It can also increase your risk for osteoarthritis, sleep apnea, and other respiratory problems. Aim for a slow, steady weight loss. Even a small amount of weight loss can lower your risk of health problems.  How to lose weight safely:  A safe and healthy way to lose weight is to eat fewer calories and get regular exercise. You can lose up about 1 pound a week by decreasing the number of calories you eat by 500 calories each day.   Healthy meal plan for weight management:  A healthy meal plan includes a variety of foods, contains fewer calories, and helps you stay healthy. A healthy meal plan includes the following:  Eat whole-grain foods more often.  A healthy meal plan should contain fiber. Fiber is the part of grains, fruits, and vegetables that is not broken down by your body. Whole-grain foods are healthy and provide extra fiber in your diet. Some examples of whole-grain foods are whole-wheat breads and pastas, oatmeal, brown rice, and bulgur.  Eat a variety of vegetables every day.  Include dark, leafy greens such as spinach, kale, rere greens, and mustard greens. Eat yellow and orange vegetables such as carrots, sweet potatoes, and winter squash.   Eat a variety of fruits every day.  Choose fresh or canned fruit (canned in its own juice or light syrup) instead of juice. Fruit juice has very little or no fiber.  Eat low-fat dairy foods.  Drink fat-free (skim) milk or 1% milk. Eat fat-free yogurt and low-fat cottage cheese. Try low-fat cheeses such as mozzarella and other  "reduced-fat cheeses.  Choose meat and other protein foods that are low in fat.  Choose beans or other legumes such as split peas or lentils. Choose fish, skinless poultry (chicken or turkey), or lean cuts of red meat (beef or pork). Before you cook meat or poultry, cut off any visible fat.   Use less fat and oil.  Try baking foods instead of frying them. Add less fat, such as margarine, sour cream, regular salad dressing and mayonnaise to foods. Eat fewer high-fat foods. Some examples of high-fat foods include french fries, doughnuts, ice cream, and cakes.  Eat fewer sweets.  Limit foods and drinks that are high in sugar. This includes candy, cookies, regular soda, and sweetened drinks.  Exercise:  Exercise at least 30 minutes per day on most days of the week. Some examples of exercise include walking, biking, dancing, and swimming. You can also fit in more physical activity by taking the stairs instead of the elevator or parking farther away from stores. Ask your healthcare provider about the best exercise plan for you.   Alcohol Use and Your Health    Drinking too much can harm your health.  Excessive alcohol use leads to about 88,000 death in the [place] each year, and shortens the life of those who diet by almost 30 years.  Further, excessive drinking cost the economy $249 billion in 2010.  Most excessive drinkers are not alcohol dependent.    Excessive alcohol use has immediate effects that increase the risk of many harmful health conditions.  These are most often the result of binge drinking.  Over time, excessive alcohol use can lead to the development of chronic diseases and other series health problems.    What is considered a \"drink\"?        Excessive alcohol use includes:  Binge Drinking: For women, 4 or more drinks consumed on one occasion. For men, 5 or more drinks consumed on one occasion.  Heavy Drinking: For women, 8 or more drinks per week. For men, 15 or more drinks per week  Any alcohol used " by pregnant women  Any alcohol used by those under the age of 21 years    If you choose to drink, do so in moderation:  Do not drink at all if you are under the age of 21, or if you are or may be pregnant, or have health problems that could be made worse by drinking.  For women, up to 1 drink per day  For men, up to 2 drinks a day    No one should begin drinking or drink more frequently based on potential health benefits    Short-Term Health Risks:  Injuries: motor vehicle crashes, falls, drownings, burns  Violence: homicide, suicide, sexual assault, intimate partner violence  Alcohol poisoning  Reproductive health: risky sexual behaviors, unintended prengnacy, sexually transmitted diseases, miscarriage, stillbirth, fetal alcohol syndrome    Long-Term Health Risks:  Chronic diseases: high blood pressure, heart disease, stroke, liver disease, digestive problems  Cancers: breast, mouth and throat, liver, colon  Learning and memory problems: dementia, poor school performance  Mental health: depression, anxiety, insomnia  Social problems: lost productivity, family problems, unemployment  Alcohol dependence    For support and more information:  Substance Abuse and Mental Health Services Administration  [address]  [place] [address]  Web Address: http://www.[facility].gov    Alcoholics Anonymous        Web Address: http://www.aa.org    https://www.cdc.gov/alcohol/fact-sheets/alcohol-use.htm     © Copyright [employer] 2018 Information is for End User's use only and may not be sold, redistributed or otherwise used for commercial purposes. All illustrations and images included in CareNotes® are the copyrighted property of [employer] or [employer]

## 2024-10-30 NOTE — PROGRESS NOTES
flAmbulatory Visit  Name: Magali Stewart      : 1955      MRN: [id number]  Encounter Provider: Wayne Wyatt MD  Encounter Date: 10/30/2024   Encounter department: [facility]    Assessment & Plan  Well adult exam         Allergic rhinitis due to pollen, unspecified seasonality  Pt is to avoid those substances that precipitate allergic reaction and to use OY+TC antihistamines and/or nasal steroid spray prn.          Chronic fatigue  Patient is stable  and will continue present plan of care and reassess at next routine visit. All questions about this problem from patient were answered today.          Mixed hyperlipidemia  Patient  is stable with current medication and we discussed a low fat low cholesterol diet. Weight loss also discussed for this will help lower cholesterol also. Recheck lipids in 6 months.          Encounter for immunization         Postviral fatigue syndrome         Current moderate episode of major depressive disorder without prior episode (HCC)             Depression Screening and Follow-up Plan: Patient was screened for depression during today's encounter. They screened negative with a PHQ-2 score of 1.    Falls Plan of Care: balance, strength, and gait training instructions were provided. Home safety education provided.     Urinary Incontinence Plan of Care: counseling topics discussed: practice Kegel (pelvic floor strengthening) exercises, use restroom every 2 hours, limit alcohol, caffeine, spicy foods, and acidic foods, limiting fluid intake 3-4 hours before bed, weight loss, preventing constipation and limiting fluid intake to 60 oz. per day.       History of Present Illness     69-year-old female here today for checkup on multimedical problems as well as her Medicare wellness.  Patient for checkup on hyperlipidemia history of allergic rhinitis chronic fatigue syndrome and having some history with some anxiety since the passing of her  which is  about a year ago.  Patient with some uneasy feeling and feeling down as well as problems with sadness and feeling that she wants to cry at times.  Will see about starting some Lexapro 10 mg daily and we will bring her back in about 6 weeks to reassess her with his feelings.  Patient also like a flu shot today and we will see about getting her her injection.  Patient labs reviewed and her cholesterol was a little bit high at 2 1 Christina I see about changing her medicine and she is going to really try and buckle down her diet.          Review of Systems        Objective     There were no vitals taken for this visit.    Physical Exam    Answers submitted by the patient for this visit:  AUDIT-C (Alcohol Use Disorders Identification Test) Screening (Submitted on 10/23/2024)  How often during the last year have you found that you were not able to stop drinking once you had started?: 0 - never  How often during the last year have you failed to do what was normally expected from you because of drinking?: 0 - never  How often during the last year have you needed a first drink in the morning to get yourself going after a heavy drinking session?: 0 - never  How often during the last year have you had a feeling of guilt or remorse after drinking?: 0 - never  How often during the last year have you been unable to remember what happened the night before because you had been drinking?: 0 - never  Have you or someone else been injured as a result of your drinking?: 0 - no  Has a relative or friend or a doctor or another health worker been concerned about your drinking or suggested you cut down?: 0 - no  Medicare Annual Wellness Visit (Submitted on 10/23/2024)  How would you rate your overall health?: good  Compared to last year, how is your physical health?: much better  In general, how satisfied are you with your life?: satisfied  Compared to last year, how is your eyesight?: same  Compared to last year, how is your hearing?:  same  Compared to last year, how is your emotional/mental health?: slightly worse  How often is anger a problem for you?: never, rarely  How often do you feel unusually tired/fatigued?: sometimes  In the past 7 days, how much pain have you experienced?: none  In the past 6 months, have you lost or gained 10 pounds without trying?: No  One or more falls in the last year: No  In the past 6 months, have you accidentally leaked urine?: Yes  Do you have trouble with the stairs inside or outside your home?: No  Does your home have working smoke alarms?: No  Does your home have a carbon monoxide monitor?: No  Which safety hazards (if any) have you experienced in your home? Please select all that apply.: none  Additional Comments: I did purchase the alarms, but they are not up yet.  How would you describe your current diet? Please select all that apply.: Unhealthy  In addition to prescription medications, are you taking any over-the-counter supplements?: Yes  If yes, what supplements are you taking?: vitamins  Can you manage your medications?: Yes  Are you currently taking any opioid medications?: No  Can you walk and transfer into and out of your bed and chair?: Yes  Can you dress and groom yourself?: Yes  Can you bathe or shower yourself?: Yes  Can you feed yourself?: Yes  Can you do your laundry/ housekeeping?: Yes  Can you manage your money, pay your bills, and track your expenses?: Yes  Can you make your own meals?: Yes  Can you do your own shopping?: Yes  Within the last 12 months, have you had any hospitalizations or Emergency Department visits?: No  Do you have a living will?: No  Do you have a Durable POA (Power of ) for healthcare decisions?: No  Do you have an Advanced Directive for end of life decisions?: No  How often have you used an illegal drug (including marijuana) or a prescription medication for non-medical reasons in the past year?: never  What is the typical number of drinks you consume in a  day?: 0  What is the typical number of drinks you consume in a week?: 6  How often did you have a drink containing alcohol in the past year?: 2 to 3 times a week  How many drinks did you have on a typical day  when you were drinking in the past year?: 1 to 2  How often did you have 6 or more drinks on one occasion in the past year?: less than monthly

## 2024-10-30 NOTE — PROGRESS NOTES
Ambulatory Visit  Name: Magali Stewart      : 1955      MRN: [id number]  Encounter Provider: Wayne Wyatt MD  Encounter Date: 10/30/2024   Encounter department: [facility]    Assessment & Plan       Preventive health issues were discussed with patient, and age appropriate screening tests were ordered as noted in patient's After Visit Summary. Personalized health advice and appropriate referrals for health education or preventive services given if needed, as noted in patient's After Visit Summary.    History of Present Illness     HPI   Patient Care Team:  Wayne Wyatt MD as PCP - General (Family Medicine)    Review of Systems  Medical History Reviewed by provider this encounter:       Annual Wellness Visit Questionnaire   Magali is here for her Subsequent Wellness visit.     Health Risk Assessment:   Patient rates overall health as good. Patient feels that their physical health rating is much better. Patient is satisfied with their life. Eyesight was rated as same. Hearing was rated as same. Patient feels that their emotional and mental health rating is slightly worse. Patients states they are never, rarely angry. Patient states they are sometimes unusually tired/fatigued. Pain experienced in the last 7 days has been none. Patient states that she has experienced no weight loss or gain in last 6 months.     Depression Screening:   PHQ-2 Score: 1      Fall Risk Screening:   In the past year, patient has experienced: no history of falling in past year      Urinary Incontinence Screening:   Patient has leaked urine accidently in the last six months.     Home Safety:  Patient does not have trouble with stairs inside or outside of their home. Patient has no working smoke alarms and has no working carbon monoxide detector. Home safety hazards include: none. I did purchase the alarms, but they are not up yet.    Nutrition:   Current diet is Unhealthy.     Medications:   Patient  is currently taking over-the-counter supplements. OTC medications include: see medication list. Patient is able to manage medications.     Activities of Daily Living (ADLs)/Instrumental Activities of Daily Living (IADLs):   Walk and transfer into and out of bed and chair?: Yes  Dress and groom yourself?: Yes    Bathe or shower yourself?: Yes    Feed yourself? Yes  Do your laundry/housekeeping?: Yes  Manage your money, pay your bills and track your expenses?: Yes  Make your own meals?: Yes    Do your own shopping?: Yes    Previous Hospitalizations:   Any hospitalizations or ED visits within the last 12 months?: No      Advance Care Planning:   Living will: No    Durable POA for healthcare: No    Advanced directive: No      PREVENTIVE SCREENINGS      Cardiovascular Screening:    General: Screening Not Indicated and History Lipid Disorder      Diabetes Screening:     General: Screening Current      Cervical Cancer Screening:    General: Screening Not Indicated      Lung Cancer Screening:     General: Screening Not Indicated      Hepatitis C Screening:    General: Screening Current    Screening, Brief Intervention, and Referral to Treatment (SBIRT)    Screening  Typical number of drinks in a day: 0  Typical number of drinks in a week: 6  Interpretation: Low risk drinking behavior.    AUDIT-C Screenin) How often did you have a drink containing alcohol in the past year? 2 to 3 times a week  2) How many drinks did you have on a typical day when you were drinking in the past year? 1 to 2  3) How often did you have 6 or more drinks on one occasion in the past year? less than monthly    AUDIT-C Score: 4  Interpretation: Score 3-12 (female): POSITIVE screen for alcohol misuse    AUDIT Screenin) How often during the last year have you found that you were not able to stop drinking once you had started? 0 - never  5) How often during the last year have you failed to do what was normally expected from you because of  drinking? 0 - never  6) How often during the last year have you needed a first drink in the morning to get yourself going after a heavy drinking session? 0 - never  7) How often during the last year have you had a feeling of guilt or remorse after drinking? 0 - never  8) How often during the last year have you been unable to remember what happened the night before because you had been drinking? 0 - never  9) Have you or someone else been injured as a result of your drinking? 0 - no  10) Has a relative or friend or a doctor or another health worker been concerned about your drinking or suggested you cut down? 0 - no    AUDIT Score: 4  Interpretation: Low risk alcohol consumption    Single Item Drug Screening:  How often have you used an illegal drug (including marijuana) or a prescription medication for non-medical reasons in the past year? never    Single Item Drug Screen Score: 0  Interpretation: Negative screen for possible drug use disorder    Social Determinants of Health     Financial Resource Strain: Low Risk  (10/20/2023)    Overall Financial Resource Strain (CARDIA)     Difficulty of Paying Living Expenses: Not very hard   Food Insecurity: No Food Insecurity (10/23/2024)    Hunger Vital Sign     Worried About Running Out of Food in the Last Year: Never true     Ran Out of Food in the Last Year: Never true   Transportation Needs: No Transportation Needs (10/23/2024)    PRAPARE - Transportation     Lack of Transportation (Medical): No     Lack of Transportation (Non-Medical): No   Housing Stability: Low Risk  (10/23/2024)    Housing Stability Vital Sign     Unable to Pay for Housing in the Last Year: No     Number of Times Moved in the Last Year: 0     Homeless in the Last Year: No   Utilities: Not At Risk (10/23/2024)    [facility] Utilities     Threatened with loss of utilities: No     No results found.    Objective     There were no vitals taken for this visit.    Physical Exam

## 2024-11-22 DIAGNOSIS — F32.1 CURRENT MODERATE EPISODE OF MAJOR DEPRESSIVE DISORDER WITHOUT PRIOR EPISODE (HCC): ICD-10-CM

## 2024-11-22 RX ORDER — ESCITALOPRAM OXALATE 10 MG/1
10 TABLET ORAL DAILY
Qty: 90 TABLET | Refills: 1 | Status: SHIPPED | OUTPATIENT
Start: 2024-11-22

## 2024-11-29 ENCOUNTER — OFFICE VISIT (OUTPATIENT)
Dept: URGENT CARE | Facility: MEDICAL CENTER | Age: 69
End: 2024-11-29
Payer: MEDICARE

## 2024-11-29 VITALS
DIASTOLIC BLOOD PRESSURE: 85 MMHG | SYSTOLIC BLOOD PRESSURE: 183 MMHG | HEIGHT: 63 IN | RESPIRATION RATE: 18 BRPM | HEART RATE: 90 BPM | OXYGEN SATURATION: 94 % | TEMPERATURE: 97.3 F | WEIGHT: 163 LBS | BODY MASS INDEX: 28.88 KG/M2

## 2024-11-29 DIAGNOSIS — K04.7 DENTAL INFECTION: Primary | ICD-10-CM

## 2024-11-29 PROCEDURE — G0463 HOSPITAL OUTPT CLINIC VISIT: HCPCS | Performed by: PHYSICIAN ASSISTANT

## 2024-11-29 PROCEDURE — 99212 OFFICE O/P EST SF 10 MIN: CPT | Performed by: PHYSICIAN ASSISTANT

## 2024-11-29 RX ORDER — AMOXICILLIN 500 MG/1
500 CAPSULE ORAL EVERY 8 HOURS SCHEDULED
Qty: 21 CAPSULE | Refills: 0 | Status: SHIPPED | OUTPATIENT
Start: 2024-11-29 | End: 2024-12-06

## 2024-11-29 NOTE — PROGRESS NOTES
[facility]        NAME: Magali Stewart is a 69 y.o. female  : 1955    MRN: [id number]  DATE: 2024  TIME: 10:51 AM    Assessment and Plan   Dental infection [K04.7]  1. Dental infection  amoxicillin (AMOXIL) 500 mg capsule            Patient Instructions     Patient Instructions   Recommended oral antibiotics.  Advised to follow-up with a dentist for further evaluation especially if she does not have any improvement once on antibiotics.        Follow up with PCP in 3-5 days.  Proceed to  ER if symptoms worsen.    If tests are performed, our office will contact you with results only if changes need to made to the care plan discussed with you at the visit. You can review your full results on [facility].      Chief Complaint     Chief Complaint   Patient presents with    Dental Pain     Last week bite down on gum on side of mouth, yesterday possible tooth painful, side of cheek swollen, red and painful.         History of Present Illness       Patient presents today for possible dental abscess and infection.  She states that she bit down on something hard about a week ago and yesterday she has started to have a lot of pain and swelling.  She does not think that she cracked any of her teeth, she has not noticed any abnormalities in her teeth.  She denies any pus or blood in her mouth.  Denies any abnormal taste.        Review of Systems   Review of Systems   HENT:  Positive for dental problem.    All other systems reviewed and are negative.        Current Medications       Current Outpatient Medications:     amoxicillin (AMOXIL) 500 mg capsule, Take 1 capsule (500 mg total) by mouth every 8 (eight) hours for 7 days, Disp: 21 capsule, Rfl: 0    escitalopram (LEXAPRO) 10 mg tablet, TAKE 1 TABLET BY MOUTH EVERY DAY, Disp: 90 tablet, Rfl: 1    ezetimibe (ZETIA) 10 mg tablet, Take 1 tablet (10 mg total) by mouth daily, Disp: 90 tablet, Rfl: 3    mometasone (NASONEX) 50 mcg/act nasal  "spray, USE 1 SPRAY IN EACH NOSTRIL TWICE A DAY, Disp: 51 g, Rfl: 3    naproxen (NAPROSYN) 250 mg tablet, Take 250 mg by mouth 2 (two) times a day with meals, Disp: , Rfl:     phentermine (ADIPEX-P) 37.5 MG tablet, Take 0.5 tablets (18.75 mg total) by mouth in the morning, Disp: 45 tablet, Rfl: 1    Current Allergies     Allergies as of 11/29/2024    (No Known Allergies)            The following portions of the patient's history were reviewed and updated as appropriate: allergies, current medications, past family history, past medical history, past social history, past surgical history and problem list.     Past Medical History:   Diagnosis Date    Allergic 1970    Allergic rhinitis due to pollen 09/18/2020    Arthritis     hands    Asthma     Obesity     Sleep apnea     Uses CPAP       Past Surgical History:   Procedure Laterality Date    JOINT REPLACEMENT      KNEE SURGERY Bilateral     REPLACEMENT    AL ARTHROPLASTY GLENOHUMERAL JOINT TOTAL SHOULDER Right 2/28/2024    Procedure: ARTHROPLASTY SHOULDER- right anatomic,Biceps tenodesis.;  Surgeon: Neeru Lovell DO;  Location:  MAIN OR;  Service: Orthopedics    TONSILLECTOMY      TUBAL LIGATION         Family History   Problem Relation Age of Onset    Other Mother         maintenance procedure for cardiac pacemaker system     Breast cancer Maternal Grandmother     Breast cancer Paternal Grandmother          Medications have been verified.        Objective   BP (!) 183/85   Pulse 90   Temp (!) 97.3 °F (36.3 °C)   Resp 18   Ht 5' 3\" (1.6 m)   Wt 73.9 kg (163 lb)   SpO2 94%   BMI 28.87 kg/m²        Physical Exam     Physical Exam  Vitals and nursing note reviewed.   Constitutional:       Appearance: Normal appearance.   HENT:      Mouth/Throat:      Dentition: Gingival swelling and dental caries present. No dental abscesses.      Comments: Swelling of the right side of the face.  Tenderness with palpation along the lower mandible on the right side.    No obvious " cracks in any of the teeth, some staining noticed.  Neurological:      General: No focal deficit present.      Mental Status: She is alert and oriented to person, place, and time.   Psychiatric:         Mood and Affect: Mood normal.         Behavior: Behavior normal.

## 2024-11-29 NOTE — PATIENT INSTRUCTIONS
Recommended oral antibiotics.  Advised to follow-up with a dentist for further evaluation especially if she does not have any improvement once on antibiotics.        Follow up with PCP in 3-5 days.  Proceed to  ER if symptoms worsen.    If tests are performed, our office will contact you with results only if changes need to made to the care plan discussed with you at the visit. You can review your full results on [facility].

## 2024-12-12 ENCOUNTER — OFFICE VISIT (OUTPATIENT)
Dept: FAMILY MEDICINE CLINIC | Facility: CLINIC | Age: 69
End: 2024-12-12
Payer: MEDICARE

## 2024-12-12 VITALS
HEART RATE: 96 BPM | HEIGHT: 63 IN | BODY MASS INDEX: 28.35 KG/M2 | DIASTOLIC BLOOD PRESSURE: 74 MMHG | WEIGHT: 160 LBS | OXYGEN SATURATION: 95 % | TEMPERATURE: 98.1 F | SYSTOLIC BLOOD PRESSURE: 144 MMHG

## 2024-12-12 DIAGNOSIS — R53.82 CHRONIC FATIGUE: Primary | ICD-10-CM

## 2024-12-12 DIAGNOSIS — F32.1 CURRENT MODERATE EPISODE OF MAJOR DEPRESSIVE DISORDER WITHOUT PRIOR EPISODE (HCC): ICD-10-CM

## 2024-12-12 DIAGNOSIS — E78.2 MIXED HYPERLIPIDEMIA: ICD-10-CM

## 2024-12-12 DIAGNOSIS — J30.1 ALLERGIC RHINITIS DUE TO POLLEN, UNSPECIFIED SEASONALITY: ICD-10-CM

## 2024-12-12 PROCEDURE — 99214 OFFICE O/P EST MOD 30 MIN: CPT | Performed by: FAMILY MEDICINE

## 2024-12-12 PROCEDURE — G2211 COMPLEX E/M VISIT ADD ON: HCPCS | Performed by: FAMILY MEDICINE

## 2024-12-12 RX ORDER — ESCITALOPRAM OXALATE 20 MG/1
20 TABLET ORAL DAILY
Qty: 30 TABLET | Refills: 1 | Status: SHIPPED | OUTPATIENT
Start: 2024-12-12

## 2024-12-12 NOTE — ASSESSMENT & PLAN NOTE
Patient  is stable with current medication and we discussed a low fat low cholesterol diet. Weight loss also discussed for this will help lower cholesterol also. Recheck lipids in 6 months.

## 2024-12-12 NOTE — ASSESSMENT & PLAN NOTE
Patient is stable  and will continue present plan of care and reassess at next routine visit. All questions about this problem from patient were answered today.

## 2024-12-12 NOTE — PROGRESS NOTES
Name: Magali Stewart      : 1955      MRN: [id number]  Encounter Provider: Wayne Wyatt MD  Encounter Date: 2024   Encounter department: [facility]  :  Assessment & Plan  Chronic fatigue  Patient is stable  and will continue present plan of care and reassess at next routine visit. All questions about this problem from patient were answered today.        Mixed hyperlipidemia  Patient  is stable with current medication and we discussed a low fat low cholesterol diet. Weight loss also discussed for this will help lower cholesterol also. Recheck lipids in 6 months.        Allergic rhinitis due to pollen, unspecified seasonality  Pt is to avoid those substances that precipitate allergic reaction and to use OY+TC antihistamines and/or nasal steroid spray prn.        Current moderate episode of major depressive disorder without prior episode (HCC)          Falls Plan of Care: balance, strength, and gait training instructions were provided. Home safety education provided.     Urinary Incontinence Plan of Care: counseling topics discussed: practice Kegel (pelvic floor strengthening) exercises, use restroom every 2 hours, limit alcohol, caffeine, spicy foods, and acidic foods, limiting fluid intake 3-4 hours before bed, weight loss, preventing constipation and limiting fluid intake to 60 oz. per day.     History of Present Illness     69-year-old female today for checkup on depression anxiety.  Patient recently was started on Lexapro she had lost her  exactly 1 year ago today.  Patient was started on Lexapro 10 mg at her last visit and she is here to see how she is doing with the patient states that she was doing better but it sounds as though she could do better with the current medication and we will going see about increasing the medicine to 20 mg dose.      Review of Systems   Constitutional:  Negative for activity change, appetite change, fatigue and fever.   HENT:   "Negative for congestion, ear pain, postnasal drip, rhinorrhea, sinus pressure, sinus pain, sneezing and sore throat.    Eyes:  Negative for pain and redness.   Respiratory:  Negative for apnea, cough, chest tightness, shortness of breath and wheezing.    Cardiovascular:  Negative for chest pain, palpitations and leg swelling.   Gastrointestinal:  Negative for abdominal pain, constipation, diarrhea, nausea and vomiting.   Endocrine: Negative for cold intolerance and heat intolerance.   Genitourinary:  Negative for difficulty urinating, dysuria, frequency, hematuria and urgency.   Musculoskeletal:  Negative for arthralgias, back pain, gait problem and myalgias.   Skin:  Negative for rash.   Neurological:  Negative for dizziness, speech difficulty, weakness, numbness and headaches.   Hematological:  Does not bruise/bleed easily.   Psychiatric/Behavioral:  Positive for dysphoric mood. Negative for agitation, confusion and hallucinations.        Objective   /74 (BP Location: Left arm, Patient Position: Sitting, Cuff Size: Large)   Pulse 96   Temp 98.1 °F (36.7 °C) (Temporal)   Ht 5' 3\" (1.6 m)   Wt 72.6 kg (160 lb)   SpO2 95%   BMI 28.34 kg/m²      Physical Exam  Constitutional:       Appearance: Normal appearance. She is not ill-appearing.   HENT:      Head: Normocephalic and atraumatic.      Right Ear: Tympanic membrane normal.      Left Ear: Tympanic membrane normal.      Nose: Nose normal.      Mouth/Throat:      Mouth: Mucous membranes are moist.   Eyes:      Extraocular Movements: Extraocular movements intact.      Conjunctiva/sclera: Conjunctivae normal.      Pupils: Pupils are equal, round, and reactive to light.   Cardiovascular:      Rate and Rhythm: Normal rate and regular rhythm.   Pulmonary:      Effort: Pulmonary effort is normal. No respiratory distress.      Breath sounds: Normal breath sounds. No wheezing.   Abdominal:      General: Bowel sounds are normal.      Palpations: Abdomen is soft. "      Tenderness: There is no abdominal tenderness.   Musculoskeletal:         General: No tenderness. Normal range of motion.      Cervical back: Normal range of motion and neck supple.      Right lower leg: No edema.      Left lower leg: No edema.   Skin:     General: Skin is warm and dry.   Neurological:      Mental Status: She is alert and oriented to person, place, and time.   Psychiatric:         Behavior: Behavior normal.         Thought Content: Thought content normal.         Judgment: Judgment normal.

## 2025-01-09 DIAGNOSIS — F32.1 CURRENT MODERATE EPISODE OF MAJOR DEPRESSIVE DISORDER WITHOUT PRIOR EPISODE (HCC): ICD-10-CM

## 2025-01-10 RX ORDER — ESCITALOPRAM OXALATE 20 MG/1
20 TABLET ORAL DAILY
Qty: 90 TABLET | Refills: 1 | Status: SHIPPED | OUTPATIENT
Start: 2025-01-10

## 2025-01-24 ENCOUNTER — RA CDI HCC (OUTPATIENT)
Dept: OTHER | Facility: HOSPITAL | Age: 70
End: 2025-01-24

## 2025-02-11 ENCOUNTER — TELEPHONE (OUTPATIENT)
Dept: FAMILY MEDICINE CLINIC | Facility: CLINIC | Age: 70
End: 2025-02-11

## 2025-02-11 DIAGNOSIS — Z79.2 NEED FOR ANTIBIOTIC PROPHYLAXIS FOR DENTAL PROCEDURE: Primary | ICD-10-CM

## 2025-02-11 RX ORDER — AMOXICILLIN 500 MG/1
CAPSULE ORAL
Qty: 4 CAPSULE | Refills: 3 | Status: SHIPPED | OUTPATIENT
Start: 2025-02-11 | End: 2025-02-12

## 2025-02-11 NOTE — TELEPHONE ENCOUNTER
Patient had a knee replacement. Patient has a dentist appointment tomorrow.     Patient is requesting a refill on amoxicillin   [facility]/pharmacy #[id number] - [place], [place] - [address]

## 2025-02-17 ENCOUNTER — OFFICE VISIT (OUTPATIENT)
Dept: FAMILY MEDICINE CLINIC | Facility: CLINIC | Age: 70
End: 2025-02-17
Payer: MEDICARE

## 2025-02-17 VITALS
HEIGHT: 63 IN | RESPIRATION RATE: 18 BRPM | WEIGHT: 164 LBS | TEMPERATURE: 96.8 F | SYSTOLIC BLOOD PRESSURE: 130 MMHG | OXYGEN SATURATION: 96 % | HEART RATE: 70 BPM | BODY MASS INDEX: 29.06 KG/M2 | DIASTOLIC BLOOD PRESSURE: 80 MMHG

## 2025-02-17 DIAGNOSIS — Z12.11 SCREEN FOR COLON CANCER: ICD-10-CM

## 2025-02-17 DIAGNOSIS — R53.82 CHRONIC FATIGUE: Primary | ICD-10-CM

## 2025-02-17 DIAGNOSIS — F32.1 CURRENT MODERATE EPISODE OF MAJOR DEPRESSIVE DISORDER WITHOUT PRIOR EPISODE (HCC): ICD-10-CM

## 2025-02-17 DIAGNOSIS — Z12.31 ENCOUNTER FOR SCREENING MAMMOGRAM FOR BREAST CANCER: ICD-10-CM

## 2025-02-17 DIAGNOSIS — E78.2 MIXED HYPERLIPIDEMIA: ICD-10-CM

## 2025-02-17 DIAGNOSIS — G93.31 POSTVIRAL FATIGUE SYNDROME: ICD-10-CM

## 2025-02-17 DIAGNOSIS — Z79.2 PROPHYLACTIC ANTIBIOTIC: ICD-10-CM

## 2025-02-17 PROCEDURE — G2211 COMPLEX E/M VISIT ADD ON: HCPCS | Performed by: FAMILY MEDICINE

## 2025-02-17 PROCEDURE — 99214 OFFICE O/P EST MOD 30 MIN: CPT | Performed by: FAMILY MEDICINE

## 2025-02-17 RX ORDER — EZETIMIBE 10 MG/1
10 TABLET ORAL DAILY
Qty: 90 TABLET | Refills: 1 | Status: SHIPPED | OUTPATIENT
Start: 2025-02-17

## 2025-02-17 RX ORDER — AMOXICILLIN 500 MG/1
CAPSULE ORAL
Qty: 8 CAPSULE | Refills: 1 | Status: SHIPPED | OUTPATIENT
Start: 2025-02-17 | End: 2025-02-18

## 2025-02-17 RX ORDER — PHENTERMINE HYDROCHLORIDE 37.5 MG/1
18.75 TABLET ORAL DAILY
Qty: 45 TABLET | Refills: 1 | Status: SHIPPED | OUTPATIENT
Start: 2025-02-17

## 2025-02-17 RX ORDER — ESCITALOPRAM OXALATE 20 MG/1
20 TABLET ORAL DAILY
Qty: 90 TABLET | Refills: 1 | Status: SHIPPED | OUTPATIENT
Start: 2025-02-17

## 2025-02-17 NOTE — ASSESSMENT & PLAN NOTE
Patient  is stable with current medication and we discussed a low fat low cholesterol diet. Weight loss also discussed for this will help lower cholesterol also. Recheck lipids in 6 months.   Orders:  •  Lipid Panel with Direct LDL reflex; Future  •  ezetimibe (ZETIA) 10 mg tablet; Take 1 tablet (10 mg total) by mouth daily

## 2025-02-17 NOTE — PROGRESS NOTES
Name: Magali Stewart      : 1955      MRN: [id number]  Encounter Provider: Wayne Wyatt MD  Encounter Date: 2025   Encounter department: [facility]  :  Assessment & Plan  Chronic fatigue  Patient is stable  and will continue present plan of care and reassess at next routine visit. All questions about this problem from patient were answered today.   Orders:  •  Comprehensive metabolic panel; Future  •  CBC and differential; Future  •  TSH, 3rd generation with Free T4 reflex; Future  •  Magnesium; Future  •  Uric acid; Future  •  UA/M w/rflx Culture, Comp    Mixed hyperlipidemia  Patient  is stable with current medication and we discussed a low fat low cholesterol diet. Weight loss also discussed for this will help lower cholesterol also. Recheck lipids in 6 months.   Orders:  •  Lipid Panel with Direct LDL reflex; Future  •  ezetimibe (ZETIA) 10 mg tablet; Take 1 tablet (10 mg total) by mouth daily    Postviral fatigue syndrome    Orders:  •  phentermine (ADIPEX-P) 37.5 MG tablet; Take 0.5 tablets (18.75 mg total) by mouth in the morning    Current moderate episode of major depressive disorder without prior episode (HCC)    Orders:  •  escitalopram (LEXAPRO) 20 mg tablet; Take 1 tablet (20 mg total) by mouth daily    Prophylactic antibiotic    Orders:  •  amoxicillin (AMOXIL) 500 mg capsule; 4 by mouth 1 hour before dental procedure    Screen for colon cancer    Orders:  •  Ambulatory Referral to Gastroenterology; Future    Encounter for screening mammogram for breast cancer    Orders:  •  Mammo screening bilateral w 3d and cad; Future          Falls Plan of Care: balance, strength, and gait training instructions were provided. Home safety education provided.     Urinary Incontinence Plan of Care: counseling topics discussed: practice Kegel (pelvic floor strengthening) exercises, use restroom every 2 hours, limit alcohol, caffeine, spicy foods, and acidic foods, limiting  "fluid intake 3-4 hours before bed, weight loss, preventing constipation and limiting fluid intake to 60 oz. per day.     History of Present Illness   HPI  Review of Systems    Objective   /80 (BP Location: Left arm, Patient Position: Sitting, Cuff Size: Standard)   Pulse 70   Temp (!) 96.8 °F (36 °C)   Resp 18   Ht 5' 3\" (1.6 m)   Wt 74.4 kg (164 lb)   SpO2 96%   BMI 29.05 kg/m²      Physical Exam    "

## 2025-02-17 NOTE — ASSESSMENT & PLAN NOTE
Patient is stable  and will continue present plan of care and reassess at next routine visit. All questions about this problem from patient were answered today.   Orders:  •  Comprehensive metabolic panel; Future  •  CBC and differential; Future  •  TSH, 3rd generation with Free T4 reflex; Future  •  Magnesium; Future  •  Uric acid; Future  •  UA/M w/rflx Culture, Comp

## 2025-02-17 NOTE — ASSESSMENT & PLAN NOTE
Orders:  •  phentermine (ADIPEX-P) 37.5 MG tablet; Take 0.5 tablets (18.75 mg total) by mouth in the morning

## 2025-03-03 ENCOUNTER — HOSPITAL ENCOUNTER (OUTPATIENT)
Dept: RADIOLOGY | Facility: HOSPITAL | Age: 70
Discharge: HOME | End: 2025-03-03
Attending: ORTHOPAEDIC SURGERY
Payer: MEDICARE

## 2025-03-03 ENCOUNTER — OFFICE VISIT (OUTPATIENT)
Dept: OBGYN CLINIC | Facility: CLINIC | Age: 70
End: 2025-03-03
Payer: MEDICARE

## 2025-03-03 VITALS — HEIGHT: 63 IN | WEIGHT: 164 LBS | BODY MASS INDEX: 29.06 KG/M2

## 2025-03-03 DIAGNOSIS — Z96.611 AFTERCARE FOLLOWING RIGHT SHOULDER JOINT REPLACEMENT SURGERY: ICD-10-CM

## 2025-03-03 DIAGNOSIS — Z47.1 AFTERCARE FOLLOWING RIGHT SHOULDER JOINT REPLACEMENT SURGERY: ICD-10-CM

## 2025-03-03 DIAGNOSIS — Z96.611 AFTERCARE FOLLOWING RIGHT SHOULDER JOINT REPLACEMENT SURGERY: Primary | ICD-10-CM

## 2025-03-03 DIAGNOSIS — Z47.1 AFTERCARE FOLLOWING RIGHT SHOULDER JOINT REPLACEMENT SURGERY: Primary | ICD-10-CM

## 2025-03-03 PROCEDURE — 99213 OFFICE O/P EST LOW 20 MIN: CPT | Performed by: ORTHOPAEDIC SURGERY

## 2025-03-03 PROCEDURE — 73030 X-RAY EXAM OF SHOULDER: CPT

## 2025-03-03 NOTE — PROGRESS NOTES
:  Assessment & Plan  Aftercare following right shoulder joint replacement surgery    Orders:    XR shoulder 2+ vw right; Future  X-ray right shoulder surgery in the office today with the patient  Patient exam does have weakness with forward flexion, external rotation.  Advised patient she may continue with the home exercise from physical therapy to work on her strength  Will repeat x-ray of the right shoulder next office visit  Patient is to follow-up in 1 year            Magali Stewart  [id number]  1955    ORTHOPAEDIC SURGERY OUTPATIENT NOTE  3/3/2025      HISTORY:  69 y.o. female presents today 1 year status post right anatomic total shoulder arthroplasty with bicep tenodesis  on 24.  Patient is doing well.  She states she does have pain occasionally in the day with overusage.  States her pain level today is a 1 out of 10 and her SANE score is 95%    Past Medical History:   Diagnosis Date    Allergic 1970    Allergic rhinitis due to pollen 2020    Arthritis     hands    Asthma     Obesity     Sleep apnea     Uses CPAP       Past Surgical History:   Procedure Laterality Date    JOINT REPLACEMENT      KNEE SURGERY Bilateral     REPLACEMENT    CT ARTHROPLASTY GLENOHUMERAL JOINT TOTAL SHOULDER Right 2024    Procedure: ARTHROPLASTY SHOULDER- right anatomic,Biceps tenodesis.;  Surgeon: Neeru Lovell DO;  Location:  MAIN OR;  Service: Orthopedics    TONSILLECTOMY      TUBAL LIGATION         Social History     Socioeconomic History    Marital status:      Spouse name: Not on file    Number of children: Not on file    Years of education: Not on file    Highest education level: Not on file   Occupational History    Occupation: RETIRED   Tobacco Use    Smoking status: Former     Current packs/day: 0.00     Average packs/day: 1 pack/day for 30.3 years (30.3 ttl pk-yrs)     Types: Cigarettes     Start date: 1978     Quit date: 2008     Years since quittin.4     Passive exposure:  Past    Smokeless tobacco: Never   Vaping Use    Vaping status: Never Used   Substance and Sexual Activity    Alcohol use: Yes     Alcohol/week: 8.0 standard drinks of alcohol     Types: 1 Cans of beer, 6 Shots of liquor, 1 Standard drinks or equivalent per week    Drug use: Never    Sexual activity: Not Currently     Partners: Male     Birth control/protection: Surgical   Other Topics Concern    Not on file   Social History Narrative    · Do you currently or have you served in the [employer]:   No      · Were you activated, into active duty, as a member of the [employer] or as a Reservist:   No      · Exercise level:   None      · Diet:   Regular      · General stress level:   Low      · ·Caffeine intake:   Moderate      · Chewing tobacco:   none      · Seat belts used routinely:   Yes      · Sunscreen used routinely:   No      · Smoke alarm in home:   Yes      ·      Social Drivers of Health     Financial Resource Strain: Low Risk  (10/20/2023)    Overall Financial Resource Strain (CARDIA)     Difficulty of Paying Living Expenses: Not very hard   Food Insecurity: No Food Insecurity (10/23/2024)    Hunger Vital Sign     Worried About Running Out of Food in the Last Year: Never true     Ran Out of Food in the Last Year: Never true   Transportation Needs: No Transportation Needs (10/23/2024)    PRAPARE - Transportation     Lack of Transportation (Medical): No     Lack of Transportation (Non-Medical): No   Physical Activity: Not on file   Stress: Not on file   Social Connections: Not on file   Intimate Partner Violence: Not on file   Housing Stability: Low Risk  (10/23/2024)    Housing Stability Vital Sign     Unable to Pay for Housing in the Last Year: No     Number of Times Moved in the Last Year: 0     Homeless in the Last Year: No       Family History   Problem Relation Age of Onset    Other Mother         maintenance procedure for cardiac pacemaker system     Breast cancer Maternal Grandmother     Breast  "cancer Paternal Grandmother         Patient's Medications   New Prescriptions    No medications on file   Previous Medications    ESCITALOPRAM (LEXAPRO) 20 MG TABLET    Take 1 tablet (20 mg total) by mouth daily    EZETIMIBE (ZETIA) 10 MG TABLET    Take 1 tablet (10 mg total) by mouth daily    MOMETASONE (NASONEX) 50 MCG/ACT NASAL SPRAY    USE 1 SPRAY IN EACH NOSTRIL TWICE A DAY    NAPROXEN (NAPROSYN) 250 MG TABLET    Take 250 mg by mouth 2 (two) times a day with meals    PHENTERMINE (ADIPEX-P) 37.5 MG TABLET    Take 0.5 tablets (18.75 mg total) by mouth in the morning   Modified Medications    No medications on file   Discontinued Medications    No medications on file       No Known Allergies     Ht 5' 3\" (1.6 m)   Wt 74.4 kg (164 lb)   BMI 29.05 kg/m²      REVIEW OF SYSTEMS:  Constitutional: Negative.    HEENT: Negative.    Respiratory: Negative.    Skin: Negative.    Neurological: Negative.    Psychiatric/Behavioral: Negative.  Musculoskeletal: Negative except for that mentioned in the HPI.    Gen: No acute distress, resting comfortably in bed  HEENT: Eyes clear, moist mucus membranes, hearing intact  Respiratory: No audible wheezing or stridor  Cardiovascular: Well Perfused peripherally, 2+ distal pulse  Abdomen: nondistended, no peritoneal signs     PHYSICAL EXAM:    RIGHT SHOULDER:    Appearance:Surgical incision well healed     Forward flexion:   180 degrees   Abduction:  180 degrees   External rotation at 0 degrees:   60 degrees   Internal rotation: L1      STRENGTH:  Forward flexion:  4+/5   Abduction:  5/5   External rotation:  4+/5   Internal rotation:  5/5       Radial/median/ulnar nerve intact  <2 sec cap refill       IMAGING:  X-ray right shoulder demonstrates Status post anatomic total shoulder arthroplasty with implant in good position and no signs of implant failure.     Scribe Attestation      I,:  Laney Aquino MA am acting as a scribe while in the presence of the attending physician.:  "      I,:  Neeru Lovell DO personally performed the services described in this documentation    as scribed in my presence.:

## 2025-03-04 ENCOUNTER — TELEPHONE (OUTPATIENT)
Age: 70
End: 2025-03-04

## 2025-03-04 DIAGNOSIS — G93.31 POSTVIRAL FATIGUE SYNDROME: ICD-10-CM

## 2025-03-04 RX ORDER — PHENTERMINE HYDROCHLORIDE 37.5 MG/1
18.75 TABLET ORAL DAILY
Qty: 10 TABLET | Refills: 0 | Status: SHIPPED | OUTPATIENT
Start: 2025-03-04

## 2025-03-04 NOTE — TELEPHONE ENCOUNTER
Patient calling to ask if a short supply of Phentermine can be called into.  [facility]. Patient stated that she ran out and is looking to get 10 days until her shipment get in. Please ask Dr. Wyatt if this is ok.  Kathia Hargrove  Please call patient when its done

## 2025-04-03 DIAGNOSIS — F32.1 CURRENT MODERATE EPISODE OF MAJOR DEPRESSIVE DISORDER WITHOUT PRIOR EPISODE (HCC): ICD-10-CM

## 2025-04-03 DIAGNOSIS — J30.1 ALLERGIC RHINITIS DUE TO POLLEN, UNSPECIFIED SEASONALITY: ICD-10-CM

## 2025-04-03 RX ORDER — MOMETASONE FUROATE MONOHYDRATE 50 UG/1
1 SPRAY, METERED NASAL 2 TIMES DAILY
Qty: 51 G | Refills: 1 | Status: SHIPPED | OUTPATIENT
Start: 2025-04-03

## 2025-04-03 RX ORDER — ESCITALOPRAM OXALATE 20 MG/1
20 TABLET ORAL DAILY
Qty: 90 TABLET | Refills: 0 | Status: SHIPPED | OUTPATIENT
Start: 2025-04-03

## 2025-04-03 NOTE — TELEPHONE ENCOUNTER
Reason for call:   [x] [facility]   [] Prior Auth  [] Other:     Office:   [x] PCP/Provider - [facility]  Authorized By: Wayne Wyatt MD  [] Specialty/Provider -     escitalopram (LEXAPRO) 20 mg tablet Take 1 tablet (20 mg total) by mouth daily     mometasone (NASONEX) 50 mcg/act nasal spray USE 1 SPRAY IN EACH NOSTRIL TWICE A DAY     [facility]: [facility] [address] [facility]   Does the patient have enough for 3 days?   [] Yes   [] No - Send as HP to POD    Mail Away Pharmacy   Does the patient have enough for 10 days?   [] Yes   [x] No - Send as HP to POD

## 2025-05-26 DIAGNOSIS — F32.1 CURRENT MODERATE EPISODE OF MAJOR DEPRESSIVE DISORDER WITHOUT PRIOR EPISODE (HCC): ICD-10-CM

## 2025-05-27 RX ORDER — ESCITALOPRAM OXALATE 20 MG/1
20 TABLET ORAL DAILY
Qty: 90 TABLET | Refills: 3 | Status: SHIPPED | OUTPATIENT
Start: 2025-05-27

## 2025-06-02 DIAGNOSIS — G93.31 POSTVIRAL FATIGUE SYNDROME: ICD-10-CM

## 2025-06-02 RX ORDER — PHENTERMINE HYDROCHLORIDE 37.5 MG/1
18.75 TABLET ORAL DAILY
Qty: 10 TABLET | Refills: 0 | Status: SHIPPED | OUTPATIENT
Start: 2025-06-02

## 2025-07-01 DIAGNOSIS — G93.31 POSTVIRAL FATIGUE SYNDROME: ICD-10-CM

## 2025-07-01 RX ORDER — PHENTERMINE HYDROCHLORIDE 37.5 MG/1
18.75 TABLET ORAL DAILY
Qty: 45 TABLET | Refills: 0 | Status: SHIPPED | OUTPATIENT
Start: 2025-07-01

## 2025-07-01 NOTE — TELEPHONE ENCOUNTER
Reason for call:   [x] Refill   [] Prior Auth  [] Other:     Office:   [x] PCP/Provider - Wayne Wyatt MD   [] Specialty/Provider -     Medication:  phentermine (ADIPEX-P) 37.5 MG tablet    Dose/Frequency: Take 0.5 tablets (18.75 mg total) by mouth in the morning,     Quantity: 45    [facility]: [facility] [address] [facility]   Does the patient have enough for 3 days?   [x] Yes   [] No - Send as HP to POD    Mail Away Pharmacy   Does the patient have enough for 10 days?   [] Yes   [x] No - Send as HP to POD

## 2025-08-13 ENCOUNTER — APPOINTMENT (OUTPATIENT)
Dept: LAB | Facility: MEDICAL CENTER | Age: 70
End: 2025-08-13
Attending: FAMILY MEDICINE
Payer: MEDICARE

## 2025-08-13 DIAGNOSIS — R53.82 CHRONIC FATIGUE: ICD-10-CM

## 2025-08-13 DIAGNOSIS — E78.2 MIXED HYPERLIPIDEMIA: ICD-10-CM

## 2025-08-13 LAB
ALBUMIN SERPL BCG-MCNC: 4 G/DL (ref 3.5–5)
ALP SERPL-CCNC: 101 U/L (ref 34–104)
ALT SERPL W P-5'-P-CCNC: 17 U/L (ref 7–52)
ANION GAP SERPL CALCULATED.3IONS-SCNC: 10 MMOL/L (ref 4–13)
AST SERPL W P-5'-P-CCNC: 16 U/L (ref 13–39)
BASOPHILS # BLD AUTO: 0.04 THOUSANDS/ÂΜL (ref 0–0.1)
BASOPHILS NFR BLD AUTO: 1 % (ref 0–1)
BILIRUB SERPL-MCNC: 0.38 MG/DL (ref 0.2–1)
BUN SERPL-MCNC: 14 MG/DL (ref 5–25)
CALCIUM SERPL-MCNC: 9 MG/DL (ref 8.4–10.2)
CHLORIDE SERPL-SCNC: 103 MMOL/L (ref 96–108)
CHOLEST SERPL-MCNC: 210 MG/DL (ref ?–200)
CO2 SERPL-SCNC: 28 MMOL/L (ref 21–32)
CREAT SERPL-MCNC: 0.53 MG/DL (ref 0.6–1.3)
EOSINOPHIL # BLD AUTO: 0.25 THOUSAND/ÂΜL (ref 0–0.61)
EOSINOPHIL NFR BLD AUTO: 6 % (ref 0–6)
ERYTHROCYTE [DISTWIDTH] IN BLOOD BY AUTOMATED COUNT: 13.7 % (ref 11.6–15.1)
GFR SERPL CREATININE-BSD FRML MDRD: 96 ML/MIN/1.73SQ M
GLUCOSE P FAST SERPL-MCNC: 86 MG/DL (ref 65–99)
HCT VFR BLD AUTO: 40.9 % (ref 34.8–46.1)
HDLC SERPL-MCNC: 68 MG/DL
HGB BLD-MCNC: 13.3 G/DL (ref 11.5–15.4)
IMM GRANULOCYTES # BLD AUTO: 0.01 THOUSAND/UL (ref 0–0.2)
IMM GRANULOCYTES NFR BLD AUTO: 0 % (ref 0–2)
LDLC SERPL CALC-MCNC: 120 MG/DL (ref 0–100)
LYMPHOCYTES # BLD AUTO: 0.95 THOUSANDS/ÂΜL (ref 0.6–4.47)
LYMPHOCYTES NFR BLD AUTO: 23 % (ref 14–44)
MAGNESIUM SERPL-MCNC: 2.3 MG/DL (ref 1.9–2.7)
MCH RBC QN AUTO: 31.9 PG (ref 26.8–34.3)
MCHC RBC AUTO-ENTMCNC: 32.5 G/DL (ref 31.4–37.4)
MCV RBC AUTO: 98 FL (ref 82–98)
MONOCYTES # BLD AUTO: 0.41 THOUSAND/ÂΜL (ref 0.17–1.22)
MONOCYTES NFR BLD AUTO: 10 % (ref 4–12)
NEUTROPHILS # BLD AUTO: 2.5 THOUSANDS/ÂΜL (ref 1.85–7.62)
NEUTS SEG NFR BLD AUTO: 60 % (ref 43–75)
NRBC BLD AUTO-RTO: 0 /100 WBCS
PLATELET # BLD AUTO: 290 THOUSANDS/UL (ref 149–390)
PMV BLD AUTO: 10.3 FL (ref 8.9–12.7)
POTASSIUM SERPL-SCNC: 4.2 MMOL/L (ref 3.5–5.3)
PROT SERPL-MCNC: 6.9 G/DL (ref 6.4–8.4)
RBC # BLD AUTO: 4.17 MILLION/UL (ref 3.81–5.12)
SODIUM SERPL-SCNC: 141 MMOL/L (ref 135–147)
TRIGL SERPL-MCNC: 112 MG/DL (ref ?–150)
TSH SERPL DL<=0.05 MIU/L-ACNC: 2.21 UIU/ML (ref 0.45–4.5)
URATE SERPL-MCNC: 4.5 MG/DL (ref 2–7.5)
WBC # BLD AUTO: 4.16 THOUSAND/UL (ref 4.31–10.16)

## 2025-08-13 PROCEDURE — 80061 LIPID PANEL: CPT

## 2025-08-13 PROCEDURE — 85025 COMPLETE CBC W/AUTO DIFF WBC: CPT

## 2025-08-13 PROCEDURE — 36415 COLL VENOUS BLD VENIPUNCTURE: CPT

## 2025-08-13 PROCEDURE — 80053 COMPREHEN METABOLIC PANEL: CPT

## 2025-08-13 PROCEDURE — 84443 ASSAY THYROID STIM HORMONE: CPT

## 2025-08-13 PROCEDURE — 83735 ASSAY OF MAGNESIUM: CPT

## 2025-08-13 PROCEDURE — 84550 ASSAY OF BLOOD/URIC ACID: CPT

## 2025-08-18 ENCOUNTER — OFFICE VISIT (OUTPATIENT)
Dept: FAMILY MEDICINE CLINIC | Facility: CLINIC | Age: 70
End: 2025-08-18
Payer: MEDICARE

## 2025-08-18 VITALS
TEMPERATURE: 97.1 F | BODY MASS INDEX: 29.06 KG/M2 | OXYGEN SATURATION: 94 % | HEART RATE: 83 BPM | HEIGHT: 63 IN | WEIGHT: 164 LBS | SYSTOLIC BLOOD PRESSURE: 142 MMHG | DIASTOLIC BLOOD PRESSURE: 72 MMHG

## 2025-08-18 DIAGNOSIS — F32.1 CURRENT MODERATE EPISODE OF MAJOR DEPRESSIVE DISORDER WITHOUT PRIOR EPISODE (HCC): Primary | ICD-10-CM

## 2025-08-18 DIAGNOSIS — E78.2 MIXED HYPERLIPIDEMIA: ICD-10-CM

## 2025-08-18 PROCEDURE — 99214 OFFICE O/P EST MOD 30 MIN: CPT | Performed by: FAMILY MEDICINE

## 2025-08-18 PROCEDURE — G2211 COMPLEX E/M VISIT ADD ON: HCPCS | Performed by: FAMILY MEDICINE

## (undated) DEVICE — DRESSING MEPILEX AG BORDER POST-OP 4 X 8 IN

## (undated) DEVICE — COBAN 4 IN STERILE

## (undated) DEVICE — ADHESIVE SKIN HIGH VISCOSITY EXOFIN 1ML

## (undated) DEVICE — INTENT TO BE USED WITH SUTURE MATERIAL FOR TISSUE CLOSURE: Brand: RICHARD-ALLAN®  NEEDLE 1/2 CIRCLE REVERSE CUTTING

## (undated) DEVICE — DRAPE C-ARM X-RAY

## (undated) DEVICE — FIBERTAPE 2MM X 7IN AR-7237-7

## (undated) DEVICE — NEPTUNE E-SEP SMOKE EVACUATION PENCIL, COATED, 70MM BLADE, PUSH BUTTON SWITCH: Brand: NEPTUNE E-SEP

## (undated) DEVICE — HEAVY DUTY TABLE COVER: Brand: CONVERTORS

## (undated) DEVICE — 3 BONE CEMENT MIXER: Brand: MIXEVAC

## (undated) DEVICE — HOOD: Brand: FLYTE, SURGICOOL

## (undated) DEVICE — GUIDE PIN 2.5 X 220MM AEQUALIS PERFORM PLUS

## (undated) DEVICE — GLOVE INDICATOR PI UNDERGLOVE SZ 8 BLUE

## (undated) DEVICE — MAYO STAND COVER: Brand: CONVERTORS

## (undated) DEVICE — VESSEL CANNULA

## (undated) DEVICE — 3M™ IOBAN™ 2 ANTIMICROBIAL INCISE DRAPE 6650EZ: Brand: IOBAN™ 2

## (undated) DEVICE — SUT MONOCRYL 2-0 SH 27 IN Y417H

## (undated) DEVICE — GROUNDING PAD UNIVERSAL SLW

## (undated) DEVICE — IMPERVIOUS STOCKINETTE: Brand: DEROYAL

## (undated) DEVICE — SYRINGE 5ML LL

## (undated) DEVICE — ELECTRODE BALL E-Z CLEAN 5 IN -0009

## (undated) DEVICE — SUT NICELOOP GREEN SZ 5 BRD PRFE IMPREG POLY

## (undated) DEVICE — INTENDED FOR TISSUE SEPARATION, AND OTHER PROCEDURES THAT REQUIRE A SHARP SURGICAL BLADE TO PUNCTURE OR CUT.: Brand: BARD-PARKER ® CARBON RIB-BACK BLADES

## (undated) DEVICE — PACK MAJOR ORTHO W/SPLITS PBDS

## (undated) DEVICE — DISPOSABLE EQUIPMENT COVER: Brand: SMALL TOWEL DRAPE

## (undated) DEVICE — GLOVE SRG BIOGEL 7.5

## (undated) DEVICE — 3M™ STERI-STRIP™ REINFORCED ADHESIVE SKIN CLOSURES, R1547, 1/2 IN X 4 IN (12 MM X 100 MM), 6 STRIPS/ENVELOPE: Brand: 3M™ STERI-STRIP™

## (undated) DEVICE — X-RAY DETECTABLE SPONGES,16 PLY: Brand: VISTEC

## (undated) DEVICE — SUT FIBERWIRE #2 1/2 CIRCLE T-5 38IN AR-7200

## (undated) DEVICE — GUIDE PIN 3 X 75MM STRL

## (undated) DEVICE — Device

## (undated) DEVICE — SUT MONOCRYL 3-0 PS-2 18 IN Y497G

## (undated) DEVICE — GLOVE SRG BIOGEL 8

## (undated) DEVICE — CHLORAPREP HI-LITE 26ML ORANGE

## (undated) DEVICE — DUAL CUT SAGITTAL BLADE

## (undated) DEVICE — SUT ETHIBOND 0 CT-2 30 IN X412H